# Patient Record
Sex: FEMALE | Race: WHITE | NOT HISPANIC OR LATINO | Employment: FULL TIME | RURAL
[De-identification: names, ages, dates, MRNs, and addresses within clinical notes are randomized per-mention and may not be internally consistent; named-entity substitution may affect disease eponyms.]

---

## 2017-05-08 RX ORDER — LISINOPRIL AND HYDROCHLOROTHIAZIDE 20; 12.5 MG/1; MG/1
TABLET ORAL
Qty: 60 TABLET | Refills: 5 | Status: SHIPPED | OUTPATIENT
Start: 2017-05-08 | End: 2017-07-07 | Stop reason: SDUPTHER

## 2017-07-07 RX ORDER — LISINOPRIL AND HYDROCHLOROTHIAZIDE 20; 12.5 MG/1; MG/1
TABLET ORAL
Qty: 60 TABLET | Refills: 12 | Status: SHIPPED | OUTPATIENT
Start: 2017-07-07 | End: 2018-05-15 | Stop reason: HOSPADM

## 2017-11-14 RX ORDER — LEVOTHYROXINE SODIUM 0.15 MG/1
TABLET ORAL
Qty: 90 TABLET | Refills: 3 | Status: SHIPPED | OUTPATIENT
Start: 2017-11-14 | End: 2017-12-12 | Stop reason: SDUPTHER

## 2017-12-12 RX ORDER — LEVOTHYROXINE SODIUM 0.15 MG/1
TABLET ORAL
Qty: 30 TABLET | Refills: 12 | Status: SHIPPED | OUTPATIENT
Start: 2017-12-12 | End: 2018-01-15 | Stop reason: SDUPTHER

## 2018-01-15 RX ORDER — LEVOTHYROXINE SODIUM 0.15 MG/1
TABLET ORAL
Qty: 30 TABLET | Refills: 12 | Status: SHIPPED | OUTPATIENT
Start: 2018-01-15 | End: 2019-03-14 | Stop reason: DRUGHIGH

## 2018-02-26 ENCOUNTER — OFFICE VISIT (OUTPATIENT)
Dept: OBSTETRICS AND GYNECOLOGY | Facility: CLINIC | Age: 58
End: 2018-02-26

## 2018-02-26 VITALS
SYSTOLIC BLOOD PRESSURE: 122 MMHG | BODY MASS INDEX: 24.64 KG/M2 | WEIGHT: 157 LBS | DIASTOLIC BLOOD PRESSURE: 73 MMHG | HEIGHT: 67 IN

## 2018-02-26 DIAGNOSIS — F33.41 RECURRENT MAJOR DEPRESSIVE DISORDER, IN PARTIAL REMISSION (HCC): Chronic | ICD-10-CM

## 2018-02-26 DIAGNOSIS — F17.200 SMOKER: Chronic | ICD-10-CM

## 2018-02-26 DIAGNOSIS — Z01.419 ENCOUNTER FOR WELL WOMAN EXAM WITH ROUTINE GYNECOLOGICAL EXAM: Primary | ICD-10-CM

## 2018-02-26 DIAGNOSIS — R53.83 LETHARGY: ICD-10-CM

## 2018-02-26 DIAGNOSIS — E03.9 ACQUIRED HYPOTHYROIDISM: Chronic | ICD-10-CM

## 2018-02-26 DIAGNOSIS — Z12.31 ENCOUNTER FOR SCREENING MAMMOGRAM FOR BREAST CANCER: ICD-10-CM

## 2018-02-26 DIAGNOSIS — I10 ESSENTIAL HYPERTENSION: Chronic | ICD-10-CM

## 2018-02-26 PROCEDURE — 99386 PREV VISIT NEW AGE 40-64: CPT | Performed by: OBSTETRICS & GYNECOLOGY

## 2018-02-26 PROCEDURE — 87624 HPV HI-RISK TYP POOLED RSLT: CPT | Performed by: OBSTETRICS & GYNECOLOGY

## 2018-02-26 PROCEDURE — 88142 CYTOPATH C/V THIN LAYER: CPT | Performed by: OBSTETRICS & GYNECOLOGY

## 2018-02-26 RX ORDER — BUPROPION HYDROCHLORIDE 150 MG/1
150 TABLET, EXTENDED RELEASE ORAL EVERY 12 HOURS SCHEDULED
Qty: 60 TABLET | Refills: 12 | Status: ON HOLD | OUTPATIENT
Start: 2018-02-26 | End: 2018-05-13 | Stop reason: ALTCHOICE

## 2018-02-26 RX ORDER — PHENTERMINE HYDROCHLORIDE 37.5 MG/1
37.5 TABLET ORAL
Qty: 60 TABLET | Refills: 3 | Status: ON HOLD | OUTPATIENT
Start: 2018-02-26 | End: 2018-05-13 | Stop reason: ALTCHOICE

## 2018-02-26 RX ORDER — PHENTERMINE HYDROCHLORIDE 30 MG/1
30 CAPSULE ORAL EVERY MORNING
Qty: 60 CAPSULE | Refills: 3 | Status: SHIPPED | OUTPATIENT
Start: 2018-02-26 | End: 2018-05-15 | Stop reason: HOSPADM

## 2018-02-26 NOTE — PROGRESS NOTES
Amee Almonte is a 57 y.o. y/o female     Chief Complaint: Establish care, annual GYN exam and Pap smear, lethargy, depression, and difficulty losing weight.    HPI: 57-year-old  with one prior vaginal delivery and hysterectomy with BSO in .  She is not currently on any hormone replacement therapy, and has not been on hormone replacement therapy since around .  She does not have any hot flashes.    She is here to establish care and for annual GYN exam and Pap smear.  She does have some lethargy and weight gain and wonders if it's related to her hormones.    She has hypertension and hypothyroidism.    Current medications include lisinopril/HCTZ 20/12.5 mg and Synthroid 150 µg.  She is .    She is an  at [employer] in [place].    She smokes a fourth pack cigarettes per day.    She is allergic to iodine which causes swelling and rash.    She has tried Wellbutrin in the past without problems, but she did not feel it gave her the same energy that phentermine did, and she requests to get back on phentermine.  I suggested both Wellbutrin and phentermine.  I have counseled her to quit smoking.    It is been about a year and a half since she's had any lab work done.    Her blood pressures well controlled.  She does not have any chest pain or heart palpitations.  Her bowels are working well.    We discussed checking fasting lab work in starting on Wellbutrin and phentermine, and she is agreeable to this plan.    I reviewed her extensive GYN records.      Review of Systems   Constitutional: Positive for fatigue and unexpected weight change ( Weight gain). Negative for activity change, appetite change, chills, diaphoresis and fever.   Gastrointestinal: Negative for abdominal pain, constipation, diarrhea and nausea.   Genitourinary: Negative for difficulty urinating, dyspareunia, dysuria, pelvic pain, urgency, vaginal bleeding, vaginal discharge and vaginal pain.   Neurological:  Negative for headaches.   Psychiatric/Behavioral: Positive for dysphoric mood. The patient is not nervous/anxious.    All other systems reviewed and are negative.     Breast ROS: negative    The following portions of the patient's history were reviewed and updated as appropriate: allergies, current medications, past family history, past medical history, past social history, past surgical history and problem list.    Allergies   Allergen Reactions   • Iodides Swelling and Rash          Current Outpatient Prescriptions:   •  levothyroxine (SYNTHROID, LEVOTHROID) 150 MCG tablet, TAKE 1 TABLET BY MOUTH EVERY DAY (MUST CALL DOCTOR FOR FURTHER REFILLS), Disp: 30 tablet, Rfl: 12  •  lisinopril-hydrochlorothiazide (PRINZIDE,ZESTORETIC) 20-12.5 MG per tablet, TAKE 2 TABLETS BY MOUTH DAILY, Disp: 60 tablet, Rfl: 12  •  Loratadine (CLARITIN PO), Take 1 tablet by mouth Daily., Disp: , Rfl:   •  buPROPion SR (WELLBUTRIN SR) 150 MG 12 hr tablet, Take 1 tablet by mouth Every 12 (Twelve) Hours., Disp: 60 tablet, Rfl: 12  •  phentermine (ADIPEX-P) 37.5 MG tablet, Take 1 tablet by mouth Daily With Lunch., Disp: 60 tablet, Rfl: 3  •  phentermine 30 MG capsule, Take 1 capsule by mouth Every Morning., Disp: 60 capsule, Rfl: 3     The patient has a family history of   Family History   Problem Relation Age of Onset   • Lung cancer Father    • Skin cancer Mother         Past Medical History:   Diagnosis Date   • Acquired hypothyroidism 2018   • Hypertension    • Recurrent major depressive disorder, in partial remission 2018   • Smoker 2018        OB History      Para Term  AB Living    1 1    1    SAB TAB Ectopic Multiple Live Births                   Social History     Social History   • Marital status:      Spouse name: N/A   • Number of children: N/A   • Years of education: N/A     Occupational History   • Not on file.     Social History Main Topics   • Smoking status: Current Every Day Smoker   •  "Smokeless tobacco: Never Used   • Alcohol use Yes   • Drug use: No   • Sexual activity: Yes     Birth control/ protection: None      Comment: Hysterectomy     Other Topics Concern   • Not on file     Social History Narrative   • No narrative on file        Past Surgical History:   Procedure Laterality Date   • HYSTERECTOMY  2003   • TUBAL ABDOMINAL LIGATION  1999        Patient Active Problem List   Diagnosis   • Hypertension   • Acquired hypothyroidism   • Recurrent major depressive disorder, in partial remission   • Smoker        Documented Vitals    02/26/18 1317   BP: 122/73   Weight: 71.2 kg (157 lb)   Height: 170.2 cm (67\")   PainSc: 0-No pain       Physical Exam   Constitutional: She is oriented to person, place, and time. No distress.   Well-developed well-nourished white female weighing 157 pounds with BMI 24.6.  Blood pressure 122/73.  Pulse 70.   HENT:   Head: Normocephalic and atraumatic.   Eyes: Conjunctivae and EOM are normal. Pupils are equal, round, and reactive to light.   Neck: Normal range of motion. Neck supple. No JVD present. No tracheal deviation present. No thyromegaly present.   Cardiovascular: Normal rate, regular rhythm, normal heart sounds and intact distal pulses.  Exam reveals no gallop and no friction rub.    No murmur heard.  Pulmonary/Chest: Effort normal and breath sounds normal. No stridor. No respiratory distress. She has no wheezes. She has no rales. She exhibits no tenderness. Right breast exhibits no inverted nipple, no mass, no nipple discharge, no skin change and no tenderness. Left breast exhibits no inverted nipple, no mass, no nipple discharge, no skin change and no tenderness. Breasts are symmetrical. There is no breast swelling.   Abdominal: Soft. Bowel sounds are normal. She exhibits no distension and no mass. There is no tenderness. There is no rebound and no guarding. No hernia. Hernia confirmed negative in the right inguinal area and confirmed negative in the left " inguinal area.   Genitourinary: Rectal exam shows no external hemorrhoid, no internal hemorrhoid, no fissure, no mass, no tenderness, anal tone normal and guaiac negative stool. No breast tenderness, discharge or bleeding. No labial fusion. There is no rash, tenderness, lesion or injury on the right labia. There is no rash, tenderness, lesion or injury on the left labia. No vaginal discharge found.   Genitourinary Comments: Pap smear of the vaginal cuff performed.  Cervix surgically absent.  Uterus surgically absent.  Adnexa surgically absent.  No pelvic masses palpated.  Urethral meatus without erythema or prolapse.   Musculoskeletal: Normal range of motion. She exhibits no edema, tenderness or deformity.   Lymphadenopathy:     She has no cervical adenopathy.        Right: No inguinal adenopathy present.        Left: No inguinal adenopathy present.   Neurological: She is alert and oriented to person, place, and time. She has normal reflexes. She displays normal reflexes. No cranial nerve deficit. She exhibits normal muscle tone. Coordination normal.   Skin: Skin is warm and dry. No rash noted. She is not diaphoretic. No erythema. No pallor.   Psychiatric: She has a normal mood and affect. Her behavior is normal. Judgment and thought content normal.   Nursing note and vitals reviewed.       Assessment        Diagnosis Plan   1. Encounter for well woman exam with routine gynecological exam  Liquid-based Pap Smear, Screening   2. Essential hypertension     3. Acquired hypothyroidism     4. Recurrent major depressive disorder, in partial remission     5. Smoker     6. Lethargy     7. Encounter for screening mammogram for breast cancer  Mammo Screening Digital Tomosynthesis Bilateral With CAD         Plan      1. Check fasting lab work.  2. Wellbutrin  mg by mouth twice a day.  3. Phentermine 30 mg capsule each morning.  4. Continue lisinopril and Synthroid.  5. Counseled to quit smoking.  6. Encouraged in diet  and exercise.  7. Handouts on depression, hot flashes, exercise, and vitamin use.   8. Follow-up in 4 weeks.  Follow-up sooner as needed.            This document has been electronically signed by Carlos Max MD on February 26, 2018 2:00 PM

## 2018-03-01 LAB
LAB AP CASE REPORT: NORMAL
LAB AP GYN ADDITIONAL INFORMATION: NORMAL
Lab: NORMAL
PATH INTERP SPEC-IMP: NORMAL
STAT OF ADQ CVX/VAG CYTO-IMP: NORMAL

## 2018-03-05 LAB — HPV I/H RISK 4 DNA CVX QL PROBE+SIG AMP: NEGATIVE

## 2018-04-24 LAB
25(OH)D3 SERPL-MCNC: 50.8 NG/ML (ref 30–100)
ALBUMIN SERPL-MCNC: 4.5 G/DL (ref 3.4–4.8)
ALBUMIN/GLOB SERPL: 1.5 G/DL (ref 1.1–1.8)
ALP SERPL-CCNC: 77 U/L (ref 38–126)
ALT SERPL W P-5'-P-CCNC: 35 U/L (ref 9–52)
ANION GAP SERPL CALCULATED.3IONS-SCNC: 14 MMOL/L (ref 5–15)
ARTICHOKE IGE QN: 159 MG/DL (ref 1–129)
AST SERPL-CCNC: 24 U/L (ref 14–36)
BASOPHILS # BLD AUTO: 0.04 10*3/MM3 (ref 0–0.2)
BASOPHILS NFR BLD AUTO: 0.4 % (ref 0–2)
BILIRUB SERPL-MCNC: 0.4 MG/DL (ref 0.2–1.3)
BUN BLD-MCNC: 12 MG/DL (ref 7–21)
BUN/CREAT SERPL: 14.6 (ref 7–25)
CALCIUM SPEC-SCNC: 10.4 MG/DL (ref 8.4–10.2)
CHLORIDE SERPL-SCNC: 97 MMOL/L (ref 95–110)
CHOLEST SERPL-MCNC: 241 MG/DL (ref 0–199)
CO2 SERPL-SCNC: 26 MMOL/L (ref 22–31)
CREAT BLD-MCNC: 0.82 MG/DL (ref 0.5–1)
DEPRECATED RDW RBC AUTO: 46.3 FL (ref 36.4–46.3)
EOSINOPHIL # BLD AUTO: 0.11 10*3/MM3 (ref 0–0.7)
EOSINOPHIL NFR BLD AUTO: 1 % (ref 0–7)
ERYTHROCYTE [DISTWIDTH] IN BLOOD BY AUTOMATED COUNT: 12.9 % (ref 11.5–14.5)
FOLATE SERPL-MCNC: >20 NG/ML (ref 2.76–21)
GFR SERPL CREATININE-BSD FRML MDRD: 72 ML/MIN/1.73 (ref 51–120)
GLOBULIN UR ELPH-MCNC: 3 GM/DL (ref 2.3–3.5)
GLUCOSE BLD-MCNC: 88 MG/DL (ref 60–100)
HCT VFR BLD AUTO: 45 % (ref 35–45)
HDLC SERPL-MCNC: 57 MG/DL (ref 60–200)
HGB BLD-MCNC: 15.3 G/DL (ref 12–15.5)
IMM GRANULOCYTES # BLD: 0.04 10*3/MM3 (ref 0–0.02)
IMM GRANULOCYTES NFR BLD: 0.4 % (ref 0–0.5)
LDLC/HDLC SERPL: 2.5 {RATIO} (ref 0–3.22)
LYMPHOCYTES # BLD AUTO: 2.89 10*3/MM3 (ref 0.6–4.2)
LYMPHOCYTES NFR BLD AUTO: 27.6 % (ref 10–50)
MCH RBC QN AUTO: 33.2 PG (ref 26.5–34)
MCHC RBC AUTO-ENTMCNC: 34 G/DL (ref 31.4–36)
MCV RBC AUTO: 97.6 FL (ref 80–98)
MONOCYTES # BLD AUTO: 0.74 10*3/MM3 (ref 0–0.9)
MONOCYTES NFR BLD AUTO: 7.1 % (ref 0–12)
NEUTROPHILS # BLD AUTO: 6.66 10*3/MM3 (ref 2–8.6)
NEUTROPHILS NFR BLD AUTO: 63.5 % (ref 37–80)
PLATELET # BLD AUTO: 467 10*3/MM3 (ref 150–450)
PMV BLD AUTO: 10.5 FL (ref 8–12)
POTASSIUM BLD-SCNC: 4.7 MMOL/L (ref 3.5–5.1)
PROT SERPL-MCNC: 7.5 G/DL (ref 6.3–8.6)
RBC # BLD AUTO: 4.61 10*6/MM3 (ref 3.77–5.16)
SODIUM BLD-SCNC: 137 MMOL/L (ref 137–145)
TRIGL SERPL-MCNC: 208 MG/DL (ref 20–199)
VIT B12 BLD-MCNC: 965 PG/ML (ref 239–931)
WBC NRBC COR # BLD: 10.48 10*3/MM3 (ref 3.2–9.8)

## 2018-04-24 PROCEDURE — 82607 VITAMIN B-12: CPT | Performed by: OBSTETRICS & GYNECOLOGY

## 2018-04-24 PROCEDURE — 84479 ASSAY OF THYROID (T3 OR T4): CPT | Performed by: OBSTETRICS & GYNECOLOGY

## 2018-04-24 PROCEDURE — 80061 LIPID PANEL: CPT | Performed by: OBSTETRICS & GYNECOLOGY

## 2018-04-24 PROCEDURE — 84443 ASSAY THYROID STIM HORMONE: CPT | Performed by: OBSTETRICS & GYNECOLOGY

## 2018-04-24 PROCEDURE — 82306 VITAMIN D 25 HYDROXY: CPT | Performed by: OBSTETRICS & GYNECOLOGY

## 2018-04-24 PROCEDURE — 82746 ASSAY OF FOLIC ACID SERUM: CPT | Performed by: OBSTETRICS & GYNECOLOGY

## 2018-04-24 PROCEDURE — 80053 COMPREHEN METABOLIC PANEL: CPT | Performed by: OBSTETRICS & GYNECOLOGY

## 2018-04-24 PROCEDURE — 36415 COLL VENOUS BLD VENIPUNCTURE: CPT | Performed by: OBSTETRICS & GYNECOLOGY

## 2018-04-24 PROCEDURE — 85025 COMPLETE CBC W/AUTO DIFF WBC: CPT | Performed by: OBSTETRICS & GYNECOLOGY

## 2018-04-24 PROCEDURE — 84436 ASSAY OF TOTAL THYROXINE: CPT | Performed by: OBSTETRICS & GYNECOLOGY

## 2018-04-25 LAB
FT4I SERPL CALC-MCNC: 3.2 (ref 1.2–4.9)
T3RU NFR SERPL: 33 % (ref 24–39)
T4 SERPL-MCNC: 9.8 UG/DL (ref 4.5–12)
TSH SERPL-ACNC: 0.17 UIU/ML (ref 0.45–4.5)

## 2018-05-02 ENCOUNTER — OFFICE VISIT (OUTPATIENT)
Dept: OBSTETRICS AND GYNECOLOGY | Facility: CLINIC | Age: 58
End: 2018-05-02

## 2018-05-02 VITALS
HEIGHT: 67 IN | SYSTOLIC BLOOD PRESSURE: 119 MMHG | WEIGHT: 157 LBS | BODY MASS INDEX: 24.64 KG/M2 | DIASTOLIC BLOOD PRESSURE: 76 MMHG

## 2018-05-02 DIAGNOSIS — F17.200 SMOKER: ICD-10-CM

## 2018-05-02 DIAGNOSIS — I10 ESSENTIAL HYPERTENSION: Primary | ICD-10-CM

## 2018-05-02 DIAGNOSIS — E03.9 ACQUIRED HYPOTHYROIDISM: ICD-10-CM

## 2018-05-02 DIAGNOSIS — F33.41 RECURRENT MAJOR DEPRESSIVE DISORDER, IN PARTIAL REMISSION (HCC): ICD-10-CM

## 2018-05-02 PROCEDURE — 99213 OFFICE O/P EST LOW 20 MIN: CPT | Performed by: OBSTETRICS & GYNECOLOGY

## 2018-05-05 NOTE — PROGRESS NOTES
Amee Almonte is a 57 y.o. y/o female     Chief Complaint: Lethargy, depression, and difficulty losing weight.    HPI: 57-year-old  with one prior vaginal delivery and hysterectomy with BSO in .  She is not currently on any hormone replacement therapy, and has not been on hormone replacement therapy since around .  She does not have any hot flashes.     She is here to establish care and for annual GYN exam and Pap smear.  She does have some lethargy and weight gain and wonders if it's related to her hormones.     She has hypertension and hypothyroidism.     Current medications include lisinopril/HCTZ 20/12.5 mg and Synthroid 150 µg.  She is .     She is an  at [employer] in [place].     She smokes a fourth pack cigarettes per day.     She is allergic to iodine which causes swelling and rash.     She has tried Wellbutrin in the past without problems, but she did not feel it gave her the same energy that phentermine did, and she requests to get back on phentermine.  I suggested both Wellbutrin and phentermine.  I have counseled her to quit smoking.     It is been about a year and a half since she's had any lab work done.     Her blood pressures well controlled.  She does not have any chest pain or heart palpitations.  Her bowels are working well.     We discussed checking fasting lab work in starting on Wellbutrin and phentermine x 2, and she is agreeable to this plan.    2018 Pap smear negative for intraepithelial lesion or malignancy and negative for high-risk HPV.    Fasting lab work obtained 2018 showed vitamin D 50.8.  B12 965.  TSH 0.169, T4 9.8, T3 uptake 33, free thyroxine index 3.2.  Total cholesterol 241, triglycerides 208, HDL 57, , LDL/HDL ratio normal at 2.50.  Hemoglobin 15.3 and hematocrit 45.0 and platelets 467,000.  Fasting glucose 88.  Normal CMP.  I reviewed all of these results with her in detail.    Review of Systems    Constitutional: Positive for fatigue. Negative for activity change, appetite change, chills, diaphoresis, fever and unexpected weight change.   Gastrointestinal: Negative for abdominal pain, constipation, diarrhea and nausea.   Genitourinary: Negative for difficulty urinating, dyspareunia, dysuria, pelvic pain, urgency, vaginal bleeding, vaginal discharge and vaginal pain.   Neurological: Negative for headaches.   Psychiatric/Behavioral: Positive for dysphoric mood. The patient is not nervous/anxious.    All other systems reviewed and are negative.     Breast ROS: negative    The following portions of the patient's history were reviewed and updated as appropriate: allergies, current medications, past family history, past medical history, past social history, past surgical history and problem list.    Allergies   Allergen Reactions   • Iodides Swelling and Rash          Current Outpatient Prescriptions:   •  buPROPion SR (WELLBUTRIN SR) 150 MG 12 hr tablet, Take 1 tablet by mouth Every 12 (Twelve) Hours., Disp: 60 tablet, Rfl: 12  •  levothyroxine (SYNTHROID, LEVOTHROID) 150 MCG tablet, TAKE 1 TABLET BY MOUTH EVERY DAY (MUST CALL DOCTOR FOR FURTHER REFILLS), Disp: 30 tablet, Rfl: 12  •  lisinopril-hydrochlorothiazide (PRINZIDE,ZESTORETIC) 20-12.5 MG per tablet, TAKE 2 TABLETS BY MOUTH DAILY, Disp: 60 tablet, Rfl: 12  •  Loratadine (CLARITIN PO), Take 1 tablet by mouth Daily., Disp: , Rfl:   •  phentermine (ADIPEX-P) 37.5 MG tablet, Take 1 tablet by mouth Daily With Lunch., Disp: 60 tablet, Rfl: 3  •  phentermine 30 MG capsule, Take 1 capsule by mouth Every Morning., Disp: 60 capsule, Rfl: 3     The patient has a family history of   Family History   Problem Relation Age of Onset   • Lung cancer Father    • Skin cancer Mother         Past Medical History:   Diagnosis Date   • Acquired hypothyroidism 2/26/2018   • Hypertension    • Recurrent major depressive disorder, in partial remission 2/26/2018   • Smoker  "2018        OB History      Para Term  AB Living    1 1    1    SAB TAB Ectopic Molar Multiple Live Births                    Social History     Social History   • Marital status:      Spouse name: N/A   • Number of children: N/A   • Years of education: N/A     Occupational History   • Not on file.     Social History Main Topics   • Smoking status: Current Every Day Smoker   • Smokeless tobacco: Never Used   • Alcohol use Yes   • Drug use: No   • Sexual activity: Yes     Birth control/ protection: None      Comment: Hysterectomy     Other Topics Concern   • Not on file     Social History Narrative   • No narrative on file        Past Surgical History:   Procedure Laterality Date   • HYSTERECTOMY     • TUBAL ABDOMINAL LIGATION          Patient Active Problem List   Diagnosis   • Hypertension   • Acquired hypothyroidism   • Recurrent major depressive disorder, in partial remission   • Smoker        Documented Vitals    18 1049   BP: 119/76   Weight: 71.2 kg (157 lb)   Height: 170.2 cm (67\")       Physical Exam   Constitutional: She is oriented to person, place, and time. She appears well-developed and well-nourished. No distress.   157 pounds with BMI 24.6   HENT:   Head: Normocephalic and atraumatic.   Eyes: Conjunctivae and EOM are normal. Pupils are equal, round, and reactive to light.   Neck: Normal range of motion. Neck supple. No JVD present. No tracheal deviation present. No thyromegaly present.   Cardiovascular: Normal rate, regular rhythm, normal heart sounds and intact distal pulses.  Exam reveals no gallop and no friction rub.    No murmur heard.  Pulmonary/Chest: Effort normal and breath sounds normal. No stridor. No respiratory distress. She has no wheezes. She has no rales. She exhibits no tenderness.   Abdominal: Soft. Bowel sounds are normal. She exhibits no distension and no mass. There is no tenderness. There is no rebound and no guarding. No hernia. "   Musculoskeletal: Normal range of motion. She exhibits no edema, tenderness or deformity.   Lymphadenopathy:     She has no cervical adenopathy.   Neurological: She is alert and oriented to person, place, and time. She has normal reflexes. She displays normal reflexes. No cranial nerve deficit. She exhibits normal muscle tone. Coordination normal.   Skin: Skin is warm and dry. No rash noted. She is not diaphoretic. No erythema. No pallor.   Psychiatric: She has a normal mood and affect. Her behavior is normal. Judgment and thought content normal.   Nursing note and vitals reviewed.       Assessment        Diagnosis Plan   1. Essential hypertension     2. Acquired hypothyroidism     3. Recurrent major depressive disorder, in partial remission     4. Smoker           Plan      1. Continue Wellbutrin  mg twice a day.  2. Continue phentermine 30 mg capsule each morning and 37.5 mg tablet at noon.  3. Continue lisinopril and Synthroid.  4. Counseled to quit smoking.  5. Encouraged in diet and exercise.  6. Follow-up in 6 weeks.  Follow-up sooner as needed.            This document has been electronically signed by Carlos Max MD on May 5, 2018 2:17 PM

## 2018-05-13 ENCOUNTER — HOSPITAL ENCOUNTER (INPATIENT)
Facility: HOSPITAL | Age: 58
LOS: 2 days | Discharge: HOME | End: 2018-05-15
Attending: INTERNAL MEDICINE | Admitting: INTERNAL MEDICINE

## 2018-05-13 ENCOUNTER — HOSPITAL ENCOUNTER (INPATIENT)
Facility: HOSPITAL | Age: 58
End: 2018-05-13
Attending: INTERNAL MEDICINE | Admitting: INTERNAL MEDICINE

## 2018-05-13 PROBLEM — I21.19 INFERIOR MI (HCC): Status: ACTIVE | Noted: 2018-05-13

## 2018-05-13 LAB
ALBUMIN SERPL-MCNC: 4 G/DL (ref 3.4–4.8)
ALBUMIN/GLOB SERPL: 1.4 G/DL (ref 1.1–1.8)
ALP SERPL-CCNC: 79 U/L (ref 38–126)
ALT SERPL W P-5'-P-CCNC: 35 U/L (ref 9–52)
ANION GAP SERPL CALCULATED.3IONS-SCNC: 6 MMOL/L (ref 5–15)
ARTICHOKE IGE QN: 143 MG/DL (ref 1–129)
AST SERPL-CCNC: 48 U/L (ref 14–36)
BILIRUB SERPL-MCNC: 0.3 MG/DL (ref 0.2–1.3)
BUN BLD-MCNC: 14 MG/DL (ref 7–21)
BUN/CREAT SERPL: 17.3 (ref 7–25)
CALCIUM SPEC-SCNC: 9.4 MG/DL (ref 8.4–10.2)
CHLORIDE SERPL-SCNC: 101 MMOL/L (ref 95–110)
CHOLEST SERPL-MCNC: 223 MG/DL (ref 0–199)
CO2 SERPL-SCNC: 28 MMOL/L (ref 22–31)
CREAT BLD-MCNC: 0.81 MG/DL (ref 0.5–1)
DEPRECATED RDW RBC AUTO: 44.6 FL (ref 36.4–46.3)
ERYTHROCYTE [DISTWIDTH] IN BLOOD BY AUTOMATED COUNT: 12.8 % (ref 11.5–14.5)
GFR SERPL CREATININE-BSD FRML MDRD: 73 ML/MIN/1.73 (ref 60–120)
GLOBULIN UR ELPH-MCNC: 2.8 GM/DL (ref 2.3–3.5)
GLUCOSE BLD-MCNC: 102 MG/DL (ref 60–100)
HCT VFR BLD AUTO: 40 % (ref 35–45)
HDLC SERPL-MCNC: 58 MG/DL (ref 60–200)
HGB BLD-MCNC: 13.9 G/DL (ref 12–15.5)
LDLC/HDLC SERPL: 2.62 {RATIO} (ref 0–3.22)
MCH RBC QN AUTO: 33.1 PG (ref 26.5–34)
MCHC RBC AUTO-ENTMCNC: 34.8 G/DL (ref 31.4–36)
MCV RBC AUTO: 95.2 FL (ref 80–98)
PLATELET # BLD AUTO: 390 10*3/MM3 (ref 150–450)
PMV BLD AUTO: 10 FL (ref 8–12)
POTASSIUM BLD-SCNC: 5.2 MMOL/L (ref 3.5–5.1)
PROT SERPL-MCNC: 6.8 G/DL (ref 6.3–8.6)
RBC # BLD AUTO: 4.2 10*6/MM3 (ref 3.77–5.16)
SODIUM BLD-SCNC: 135 MMOL/L (ref 137–145)
TRIGL SERPL-MCNC: 66 MG/DL (ref 20–199)
TROPONIN I SERPL-MCNC: 1.52 NG/ML
WBC NRBC COR # BLD: 13.43 10*3/MM3 (ref 3.2–9.8)

## 2018-05-13 PROCEDURE — B2151ZZ FLUOROSCOPY OF LEFT HEART USING LOW OSMOLAR CONTRAST: ICD-10-PCS | Performed by: INTERNAL MEDICINE

## 2018-05-13 PROCEDURE — B2111ZZ FLUOROSCOPY OF MULTIPLE CORONARY ARTERIES USING LOW OSMOLAR CONTRAST: ICD-10-PCS | Performed by: INTERNAL MEDICINE

## 2018-05-13 PROCEDURE — C1894 INTRO/SHEATH, NON-LASER: HCPCS | Performed by: INTERNAL MEDICINE

## 2018-05-13 PROCEDURE — 25010000002 HEPARIN (PORCINE) PER 1000 UNITS: Performed by: INTERNAL MEDICINE

## 2018-05-13 PROCEDURE — 84484 ASSAY OF TROPONIN QUANT: CPT | Performed by: INTERNAL MEDICINE

## 2018-05-13 PROCEDURE — 93010 ELECTROCARDIOGRAM REPORT: CPT | Performed by: INTERNAL MEDICINE

## 2018-05-13 PROCEDURE — 0 IOPAMIDOL PER 1 ML: Performed by: INTERNAL MEDICINE

## 2018-05-13 PROCEDURE — C1769 GUIDE WIRE: HCPCS | Performed by: INTERNAL MEDICINE

## 2018-05-13 PROCEDURE — 85027 COMPLETE CBC AUTOMATED: CPT | Performed by: INTERNAL MEDICINE

## 2018-05-13 PROCEDURE — 93458 L HRT ARTERY/VENTRICLE ANGIO: CPT | Performed by: INTERNAL MEDICINE

## 2018-05-13 PROCEDURE — 25010000002 DIPHENHYDRAMINE PER 50 MG: Performed by: INTERNAL MEDICINE

## 2018-05-13 PROCEDURE — C1725 CATH, TRANSLUMIN NON-LASER: HCPCS | Performed by: INTERNAL MEDICINE

## 2018-05-13 PROCEDURE — 94799 UNLISTED PULMONARY SVC/PX: CPT

## 2018-05-13 PROCEDURE — 25010000002 METHYLPREDNISOLONE PER 125 MG: Performed by: INTERNAL MEDICINE

## 2018-05-13 PROCEDURE — 94760 N-INVAS EAR/PLS OXIMETRY 1: CPT

## 2018-05-13 PROCEDURE — C9606 PERC D-E COR REVASC W AMI S: HCPCS | Performed by: INTERNAL MEDICINE

## 2018-05-13 PROCEDURE — 4A023N7 MEASUREMENT OF CARDIAC SAMPLING AND PRESSURE, LEFT HEART, PERCUTANEOUS APPROACH: ICD-10-PCS | Performed by: INTERNAL MEDICINE

## 2018-05-13 PROCEDURE — C1887 CATHETER, GUIDING: HCPCS | Performed by: INTERNAL MEDICINE

## 2018-05-13 PROCEDURE — 93005 ELECTROCARDIOGRAM TRACING: CPT | Performed by: INTERNAL MEDICINE

## 2018-05-13 PROCEDURE — 80061 LIPID PANEL: CPT | Performed by: INTERNAL MEDICINE

## 2018-05-13 PROCEDURE — 99223 1ST HOSP IP/OBS HIGH 75: CPT | Performed by: INTERNAL MEDICINE

## 2018-05-13 PROCEDURE — 25010000002 ONDANSETRON PER 1 MG: Performed by: INTERNAL MEDICINE

## 2018-05-13 PROCEDURE — 92928 PRQ TCAT PLMT NTRAC ST 1 LES: CPT | Performed by: INTERNAL MEDICINE

## 2018-05-13 PROCEDURE — 80053 COMPREHEN METABOLIC PANEL: CPT | Performed by: INTERNAL MEDICINE

## 2018-05-13 PROCEDURE — C1874 STENT, COATED/COV W/DEL SYS: HCPCS | Performed by: INTERNAL MEDICINE

## 2018-05-13 PROCEDURE — 25010000002 HYDROMORPHONE PER 4 MG: Performed by: INTERNAL MEDICINE

## 2018-05-13 PROCEDURE — 027034Z DILATION OF CORONARY ARTERY, ONE ARTERY WITH DRUG-ELUTING INTRALUMINAL DEVICE, PERCUTANEOUS APPROACH: ICD-10-PCS | Performed by: INTERNAL MEDICINE

## 2018-05-13 PROCEDURE — C1760 CLOSURE DEV, VASC: HCPCS | Performed by: INTERNAL MEDICINE

## 2018-05-13 DEVICE — XIENCE ALPINE EVEROLIMUS ELUTING CORONARY STENT SYSTEM 3.00 MM X 23 MM / RAPID-EXCHANGE
Type: IMPLANTABLE DEVICE | Status: FUNCTIONAL
Brand: XIENCE ALPINE

## 2018-05-13 RX ORDER — METOPROLOL SUCCINATE 25 MG/1
25 TABLET, EXTENDED RELEASE ORAL
Status: DISCONTINUED | OUTPATIENT
Start: 2018-05-13 | End: 2018-05-15 | Stop reason: HOSPADM

## 2018-05-13 RX ORDER — LIDOCAINE HYDROCHLORIDE 20 MG/ML
INJECTION, SOLUTION INFILTRATION; PERINEURAL AS NEEDED
Status: DISCONTINUED | OUTPATIENT
Start: 2018-05-13 | End: 2018-05-13 | Stop reason: HOSPADM

## 2018-05-13 RX ORDER — LEVOTHYROXINE SODIUM 0.15 MG/1
150 TABLET ORAL
Status: DISCONTINUED | OUTPATIENT
Start: 2018-05-14 | End: 2018-05-15 | Stop reason: HOSPADM

## 2018-05-13 RX ORDER — DIPHENHYDRAMINE HYDROCHLORIDE 50 MG/ML
INJECTION INTRAMUSCULAR; INTRAVENOUS AS NEEDED
Status: DISCONTINUED | OUTPATIENT
Start: 2018-05-13 | End: 2018-05-13 | Stop reason: HOSPADM

## 2018-05-13 RX ORDER — ONDANSETRON 2 MG/ML
INJECTION INTRAMUSCULAR; INTRAVENOUS AS NEEDED
Status: DISCONTINUED | OUTPATIENT
Start: 2018-05-13 | End: 2018-05-13 | Stop reason: HOSPADM

## 2018-05-13 RX ORDER — METHYLPREDNISOLONE SODIUM SUCCINATE 125 MG/2ML
INJECTION, POWDER, LYOPHILIZED, FOR SOLUTION INTRAMUSCULAR; INTRAVENOUS AS NEEDED
Status: DISCONTINUED | OUTPATIENT
Start: 2018-05-13 | End: 2018-05-13 | Stop reason: HOSPADM

## 2018-05-13 RX ORDER — ROSUVASTATIN CALCIUM 10 MG/1
10 TABLET, COATED ORAL NIGHTLY
Status: DISCONTINUED | OUTPATIENT
Start: 2018-05-13 | End: 2018-05-15 | Stop reason: HOSPADM

## 2018-05-13 RX ORDER — ASPIRIN 81 MG/1
81 TABLET, CHEWABLE ORAL DAILY
Status: DISCONTINUED | OUTPATIENT
Start: 2018-05-13 | End: 2018-05-15 | Stop reason: HOSPADM

## 2018-05-13 RX ORDER — HEPARIN SODIUM 1000 [USP'U]/ML
INJECTION, SOLUTION INTRAVENOUS; SUBCUTANEOUS AS NEEDED
Status: DISCONTINUED | OUTPATIENT
Start: 2018-05-13 | End: 2018-05-13 | Stop reason: HOSPADM

## 2018-05-13 RX ORDER — FAMOTIDINE 20 MG/50ML
20 INJECTION, SOLUTION INTRAVENOUS ONCE
Status: COMPLETED | OUTPATIENT
Start: 2018-05-13 | End: 2018-05-13

## 2018-05-13 RX ORDER — PANTOPRAZOLE SODIUM 40 MG/1
40 TABLET, DELAYED RELEASE ORAL
Status: DISCONTINUED | OUTPATIENT
Start: 2018-05-14 | End: 2018-05-15 | Stop reason: HOSPADM

## 2018-05-13 RX ORDER — SODIUM CHLORIDE 9 MG/ML
125 INJECTION, SOLUTION INTRAVENOUS CONTINUOUS
Status: DISCONTINUED | OUTPATIENT
Start: 2018-05-13 | End: 2018-05-14

## 2018-05-13 RX ORDER — BUPROPION HYDROCHLORIDE 150 MG/1
150 TABLET, EXTENDED RELEASE ORAL EVERY 12 HOURS SCHEDULED
Status: DISCONTINUED | OUTPATIENT
Start: 2018-05-13 | End: 2018-05-14

## 2018-05-13 RX ADMIN — TICAGRELOR 90 MG: 90 TABLET ORAL at 20:02

## 2018-05-13 RX ADMIN — METOPROLOL SUCCINATE 25 MG: 25 TABLET, EXTENDED RELEASE ORAL at 17:12

## 2018-05-13 RX ADMIN — ROSUVASTATIN CALCIUM 10 MG: 10 TABLET, FILM COATED ORAL at 20:02

## 2018-05-13 RX ADMIN — SODIUM CHLORIDE 125 ML/HR: 900 INJECTION, SOLUTION INTRAVENOUS at 18:08

## 2018-05-13 RX ADMIN — FAMOTIDINE 20 MG: 20 INJECTION, SOLUTION INTRAVENOUS at 10:40

## 2018-05-13 RX ADMIN — HYDROMORPHONE HYDROCHLORIDE 0.2 MG: 1 INJECTION, SOLUTION INTRAMUSCULAR; INTRAVENOUS; SUBCUTANEOUS at 21:36

## 2018-05-13 NOTE — H&P
[facility] Cardiology  HISTORY AND PHYSICAL  Amee Almonte  57 y.o. female    Chief complaint -severe chest pain      History of present Illness- 57-year-old lady transferred from [facility] emergency room by Dr. Don after she presented there with severe substernal chest pain associated with diaphoresis and shortness of breath with nausea at about 8:00 and was found to have ST elevation in inferolateral leads and was transferred here for possible PCI.  Patient has been having chest pain off and on.  She works at a bank as a  and she is a smoker smokes about half pack a day.  She has history of hypertension and has history of CAD in her family.        Allergies   Allergen Reactions   • Iodides Swelling and Rash         Past Medical History:   Diagnosis Date   • Acquired hypothyroidism 2/26/2018   • Hypertension    • Recurrent major depressive disorder, in partial remission 2/26/2018   • Smoker 2/26/2018         Past Surgical History:   Procedure Laterality Date   • HYSTERECTOMY  2003   • TUBAL ABDOMINAL LIGATION  1999         Family History   Problem Relation Age of Onset   • Lung cancer Father    • Skin cancer Mother          Social History     Social History   • Marital status:      Spouse name: N/A   • Number of children: N/A   • Years of education: N/A     Occupational History   • Not on file.     Social History Main Topics   • Smoking status: Current Every Day Smoker   • Smokeless tobacco: Never Used   • Alcohol use Yes   • Drug use: No   • Sexual activity: Yes     Birth control/ protection: None      Comment: Hysterectomy     Other Topics Concern   • Not on file     Social History Narrative   • No narrative on file     Family history-premature CAD in her family    Personal history-smokes half a day    Home medications-Synthroid 150 µg daily, Zestoretic 20/12.5 mg daily, Wellbutrin 150 mg twice a day      Review of Systems     Constitution: Denies any  fatigue, fever or chills    HENT: Denies any headache, hearing impairment    Eyes: Denies any blurring of vision, or photophobia     Cardivascular - As per history of present illness     Respiratory system-denies any COPD, shortness of breath     Endocrine:  No history of hyperlipidemia, diabetes       Musculoskeletal:  No history of arthritis with musculoskeletal problems    Gastrointestinal: No nausea, vomiting, or melena    Genitourinary: No dysuria or hematuria    Neurological:   No history of seizure disorder, stroke, memory problems    Psychiatric/Behavioral:        No history of depression, bipolar disorder or schizophrenia     Hematological- no history of easy bruising            OBJECTIVE    There were no vitals taken for this visit.      Physical Exam     Constitutional: is oriented to person, place, and time.     Skin-warm and dry    Well developed and nourished in no acute distress      Head: Normocephalic and atraumatic.     Eyes: Pupils are equal, round, and reactive to light.     Neck: Neck supple. No bruit in the carotids, no elevation of JVD    Cardiovascular: Lemmon in the fifth intercostal space, regular rate, and  Rhythm,  S1 greater than S2, no S3 or S4, no gallop       Pulmonary/Chest:   Air  Entry is equal on both sides  No wheezing or crackles,      Abdominal: Soft.  No hepatosplenomegaly, bowel sounds are present    Musculoskeletal: No kyphoscoliosis    Neurological: is alert and oriented to person, place, and time.    cranial nerve are intact .   No motor or sensory deficit    Extremities-no edema, no radial femoral delay      Psychiatric: He has a normal mood and affect.                  His behavior is normal.        Lab Results (last 24 hours)     ** No results found for the last 24 hours. **          EKG showed ST elevation 2,3 and aVF with reciprocal changes in inferolateral leads       A/P    Acute inferior wall MI-explain the risk and benefit and patient was taken to the catheter lab  emergently for possible PCI as she was having active chest pain with EKG changes.    Hypertension-will hold the blood pressure medicines depending on her blood pressure after the PCI.    Tobacco abuse needs his factor modification.    We will start her on statins.        Bro Sotelo MD  5/13/2018  11:17 AM    EMR Dragon/Transcription disclaimer:   Some of this note may be an electronic transcription/translation of spoken language to printed text. The electronic translation of spoken language may permit erroneous, or at times, nonsensical words or phrases to be inadvertently transcribed; Although I have reviewed the note for such errors, some may still exist.

## 2018-05-13 NOTE — PLAN OF CARE
Problem: Patient Care Overview  Goal: Plan of Care Review  Outcome: Ongoing (interventions implemented as appropriate)   05/13/18 6398   Coping/Psychosocial   Plan of Care Reviewed With patient;spouse   Plan of Care Review   Progress improving   OTHER   Outcome Summary R groin remains angiosealed without evidence of hematoma, soft, non tender, pulses WNL, no changes to color. Denies tingling numbness. Up out of bed without difficulty. UOP adequate. VSS this shift.        Problem: Cardiac Catheterization (Diagnostic/Interventional) (Adult)  Goal: Signs and Symptoms of Listed Potential Problems Will be Absent, Minimized or Managed (Cardiac Catheterization)  Outcome: Ongoing (interventions implemented as appropriate)    Goal: Anesthesia/Sedation Recovery  Outcome: Outcome(s) achieved Date Met: 05/13/18

## 2018-05-14 ENCOUNTER — APPOINTMENT (OUTPATIENT)
Dept: CARDIOLOGY | Facility: HOSPITAL | Age: 58
End: 2018-05-14
Attending: INTERNAL MEDICINE

## 2018-05-14 LAB
ANION GAP SERPL CALCULATED.3IONS-SCNC: 5 MMOL/L (ref 5–15)
BH CV ECHO MEAS - ACS: 2 CM
BH CV ECHO MEAS - AO MAX PG (FULL): 2.6 MMHG
BH CV ECHO MEAS - AO MAX PG: 8 MMHG
BH CV ECHO MEAS - AO MEAN PG (FULL): 0.94 MMHG
BH CV ECHO MEAS - AO MEAN PG: 3.6 MMHG
BH CV ECHO MEAS - AO ROOT AREA (BSA CORRECTED): 1.8
BH CV ECHO MEAS - AO ROOT AREA: 8.9 CM^2
BH CV ECHO MEAS - AO ROOT DIAM: 3.4 CM
BH CV ECHO MEAS - AO V2 MAX: 141 CM/SEC
BH CV ECHO MEAS - AO V2 MEAN: 89.2 CM/SEC
BH CV ECHO MEAS - AO V2 VTI: 24.9 CM
BH CV ECHO MEAS - AVA(I,A): 2 CM^2
BH CV ECHO MEAS - AVA(I,D): 2 CM^2
BH CV ECHO MEAS - AVA(V,A): 2 CM^2
BH CV ECHO MEAS - AVA(V,D): 2 CM^2
BH CV ECHO MEAS - BSA(HAYCOCK): 1.9 M^2
BH CV ECHO MEAS - BSA: 1.8 M^2
BH CV ECHO MEAS - BZI_BMI: 27.9 KILOGRAMS/M^2
BH CV ECHO MEAS - BZI_METRIC_HEIGHT: 165 CM
BH CV ECHO MEAS - BZI_METRIC_WEIGHT: 76 KG
BH CV ECHO MEAS - EDV(CUBED): 73.5 ML
BH CV ECHO MEAS - EDV(TEICH): 78.1 ML
BH CV ECHO MEAS - EF(CUBED): 66.9 %
BH CV ECHO MEAS - EF(TEICH): 58.8 %
BH CV ECHO MEAS - ESV(CUBED): 24.3 ML
BH CV ECHO MEAS - ESV(TEICH): 32.1 ML
BH CV ECHO MEAS - FS: 30.8 %
BH CV ECHO MEAS - IVS/LVPW: 0.98
BH CV ECHO MEAS - IVSD: 0.99 CM
BH CV ECHO MEAS - LA DIMENSION: 4 CM
BH CV ECHO MEAS - LA/AO: 1.2
BH CV ECHO MEAS - LV MASS(C)D: 136.5 GRAMS
BH CV ECHO MEAS - LV MASS(C)DI: 74.4 GRAMS/M^2
BH CV ECHO MEAS - LV MAX PG: 5.4 MMHG
BH CV ECHO MEAS - LV MEAN PG: 2.7 MMHG
BH CV ECHO MEAS - LV V1 MAX: 116 CM/SEC
BH CV ECHO MEAS - LV V1 MEAN: 76 CM/SEC
BH CV ECHO MEAS - LV V1 VTI: 20.2 CM
BH CV ECHO MEAS - LVIDD: 4.2 CM
BH CV ECHO MEAS - LVIDS: 2.9 CM
BH CV ECHO MEAS - LVOT AREA: 2.4 CM^2
BH CV ECHO MEAS - LVOT DIAM: 1.8 CM
BH CV ECHO MEAS - LVPWD: 1 CM
BH CV ECHO MEAS - MR MAX PG: 77.3 MMHG
BH CV ECHO MEAS - MR MAX VEL: 439.7 CM/SEC
BH CV ECHO MEAS - MV A MAX VEL: 79.2 CM/SEC
BH CV ECHO MEAS - MV E MAX VEL: 94.8 CM/SEC
BH CV ECHO MEAS - MV E/A: 1.2
BH CV ECHO MEAS - MV MAX PG: 5.2 MMHG
BH CV ECHO MEAS - MV MEAN PG: 2.3 MMHG
BH CV ECHO MEAS - MV P1/2T MAX VEL: 110 CM/SEC
BH CV ECHO MEAS - MV V2 MAX: 114 CM/SEC
BH CV ECHO MEAS - MV V2 MEAN: 72.7 CM/SEC
BH CV ECHO MEAS - MV V2 VTI: 25.2 CM
BH CV ECHO MEAS - MVA P1/2T LCG: 2 CM^2
BH CV ECHO MEAS - MVA(VTI): 2 CM^2
BH CV ECHO MEAS - PA MAX PG: 2.2 MMHG
BH CV ECHO MEAS - PA V2 MAX: 74.6 CM/SEC
BH CV ECHO MEAS - RAP SYSTOLE: 10 MMHG
BH CV ECHO MEAS - RVDD: 2.7 CM
BH CV ECHO MEAS - SI(AO): 120.1 ML/M^2
BH CV ECHO MEAS - SI(CUBED): 26.8 ML/M^2
BH CV ECHO MEAS - SI(LVOT): 26.9 ML/M^2
BH CV ECHO MEAS - SI(TEICH): 25 ML/M^2
BH CV ECHO MEAS - SV(AO): 220.3 ML
BH CV ECHO MEAS - SV(CUBED): 49.2 ML
BH CV ECHO MEAS - SV(LVOT): 49.3 ML
BH CV ECHO MEAS - SV(TEICH): 45.9 ML
BUN BLD-MCNC: 10 MG/DL (ref 7–21)
BUN/CREAT SERPL: 13.9 (ref 7–25)
CALCIUM SPEC-SCNC: 8.6 MG/DL (ref 8.4–10.2)
CHLORIDE SERPL-SCNC: 106 MMOL/L (ref 95–110)
CO2 SERPL-SCNC: 25 MMOL/L (ref 22–31)
CREAT BLD-MCNC: 0.72 MG/DL (ref 0.5–1)
DEPRECATED RDW RBC AUTO: 46.2 FL (ref 36.4–46.3)
ERYTHROCYTE [DISTWIDTH] IN BLOOD BY AUTOMATED COUNT: 13.2 % (ref 11.5–14.5)
GFR SERPL CREATININE-BSD FRML MDRD: 84 ML/MIN/1.73 (ref 60–120)
GLUCOSE BLD-MCNC: 92 MG/DL (ref 60–100)
HCT VFR BLD AUTO: 36.4 % (ref 35–45)
HGB BLD-MCNC: 12.6 G/DL (ref 12–15.5)
LV EF 2D ECHO EST: 60 %
MAXIMAL PREDICTED HEART RATE: 165 BPM
MCH RBC QN AUTO: 33.6 PG (ref 26.5–34)
MCHC RBC AUTO-ENTMCNC: 34.6 G/DL (ref 31.4–36)
MCV RBC AUTO: 97.1 FL (ref 80–98)
PLATELET # BLD AUTO: 384 10*3/MM3 (ref 150–450)
PMV BLD AUTO: 9.6 FL (ref 8–12)
POTASSIUM BLD-SCNC: 3.8 MMOL/L (ref 3.5–5.1)
RBC # BLD AUTO: 3.75 10*6/MM3 (ref 3.77–5.16)
SODIUM BLD-SCNC: 136 MMOL/L (ref 137–145)
STRESS TARGET HR: 140 BPM
TROPONIN I SERPL-MCNC: 9.25 NG/ML
WBC NRBC COR # BLD: 14.22 10*3/MM3 (ref 3.2–9.8)

## 2018-05-14 PROCEDURE — 99232 SBSQ HOSP IP/OBS MODERATE 35: CPT | Performed by: INTERNAL MEDICINE

## 2018-05-14 PROCEDURE — 93306 TTE W/DOPPLER COMPLETE: CPT | Performed by: INTERNAL MEDICINE

## 2018-05-14 PROCEDURE — 80048 BASIC METABOLIC PNL TOTAL CA: CPT | Performed by: INTERNAL MEDICINE

## 2018-05-14 PROCEDURE — 93306 TTE W/DOPPLER COMPLETE: CPT

## 2018-05-14 PROCEDURE — 93010 ELECTROCARDIOGRAM REPORT: CPT | Performed by: INTERNAL MEDICINE

## 2018-05-14 PROCEDURE — 84484 ASSAY OF TROPONIN QUANT: CPT | Performed by: INTERNAL MEDICINE

## 2018-05-14 PROCEDURE — 85027 COMPLETE CBC AUTOMATED: CPT | Performed by: INTERNAL MEDICINE

## 2018-05-14 PROCEDURE — 93005 ELECTROCARDIOGRAM TRACING: CPT | Performed by: INTERNAL MEDICINE

## 2018-05-14 RX ORDER — LISINOPRIL 5 MG/1
5 TABLET ORAL
Status: DISCONTINUED | OUTPATIENT
Start: 2018-05-14 | End: 2018-05-15 | Stop reason: HOSPADM

## 2018-05-14 RX ADMIN — PANTOPRAZOLE SODIUM 40 MG: 40 TABLET, DELAYED RELEASE ORAL at 06:07

## 2018-05-14 RX ADMIN — LISINOPRIL 5 MG: 5 TABLET ORAL at 09:50

## 2018-05-14 RX ADMIN — LEVOTHYROXINE SODIUM 150 MCG: 150 TABLET ORAL at 06:07

## 2018-05-14 RX ADMIN — ASPIRIN 81 MG 81 MG: 81 TABLET ORAL at 08:09

## 2018-05-14 RX ADMIN — SODIUM CHLORIDE 125 ML/HR: 900 INJECTION, SOLUTION INTRAVENOUS at 02:55

## 2018-05-14 RX ADMIN — METOPROLOL SUCCINATE 25 MG: 25 TABLET, EXTENDED RELEASE ORAL at 18:03

## 2018-05-14 RX ADMIN — TICAGRELOR 90 MG: 90 TABLET ORAL at 20:09

## 2018-05-14 RX ADMIN — ROSUVASTATIN CALCIUM 10 MG: 10 TABLET, FILM COATED ORAL at 20:09

## 2018-05-14 RX ADMIN — TICAGRELOR 90 MG: 90 TABLET ORAL at 08:09

## 2018-05-14 NOTE — PLAN OF CARE
Problem: Patient Care Overview  Goal: Plan of Care Review  Outcome: Ongoing (interventions implemented as appropriate)   05/14/18 2883   Coping/Psychosocial   Plan of Care Reviewed With patient;family   Plan of Care Review   Progress improving   OTHER   Outcome Summary patient transfered from CCU to 3W       Problem: Cardiac Catheterization (Diagnostic/Interventional) (Adult)  Goal: Signs and Symptoms of Listed Potential Problems Will be Absent, Minimized or Managed (Cardiac Catheterization)  Outcome: Ongoing (interventions implemented as appropriate)      Problem: Pain, Acute (Adult)  Goal: Identify Related Risk Factors and Signs and Symptoms  Outcome: Ongoing (interventions implemented as appropriate)

## 2018-05-14 NOTE — PLAN OF CARE
Problem: Patient Care Overview  Goal: Plan of Care Review  Outcome: Ongoing (interventions implemented as appropriate)   05/14/18 0583   Coping/Psychosocial   Plan of Care Reviewed With patient   Plan of Care Review   Progress improving   OTHER   Outcome Summary R groin remains angiosealed soft , nontender,pulses WNL no changes noted, has rested during night.     Goal: Individualization and Mutuality  Outcome: Ongoing (interventions implemented as appropriate)    Goal: Discharge Needs Assessment  Outcome: Ongoing (interventions implemented as appropriate)      Problem: Cardiac Catheterization (Diagnostic/Interventional) (Adult)  Goal: Signs and Symptoms of Listed Potential Problems Will be Absent, Minimized or Managed (Cardiac Catheterization)  Outcome: Ongoing (interventions implemented as appropriate)      Problem: Pain, Acute (Adult)  Goal: Identify Related Risk Factors and Signs and Symptoms  Outcome: Ongoing (interventions implemented as appropriate)    Goal: Acceptable Pain Control/Comfort Level  Outcome: Ongoing (interventions implemented as appropriate)

## 2018-05-14 NOTE — PROGRESS NOTES
[facility]  INPATIENT PROGRESS NOTE    Name: Amee Almonte  Age/Sex: 57 y.o. female  :  1960        PCP: No Known Provider    Vital Signs  Temp:  [97.9 °F (36.6 °C)-99.1 °F (37.3 °C)] 98.1 °F (36.7 °C)  Heart Rate:  [] 89  Resp:  [16-20] 16  BP: (109-161)/(56-89) 109/62  Body mass index is 27.96 kg/m².      Subjective  57-year-old lady who had an acute inferior wall MI and PCI to the mid RCA doing well, there was involvement of the with hypotension responded to IV fluids and her blood pressure is better now her LDL is high, started her on Crestor.  Her groin is okay.  We'll transfer her to telemetry floor and ambulate her      Patient Active Problem List   Diagnosis   • Hypertension   • Acquired hypothyroidism   • Recurrent major depressive disorder, in partial remission   • Smoker   • Inferior MI       Past Medical History:   Diagnosis Date   • Acquired hypothyroidism 2018   • Hypertension    • Recurrent major depressive disorder, in partial remission 2018   • Smoker 2018       Current Facility-Administered Medications   Medication Dose Route Frequency Provider Last Rate Last Dose   • aspirin chewable tablet 81 mg  81 mg Oral Daily Bro Sotelo MD   81 mg at 18 0809   • buPROPion SR (WELLBUTRIN SR) 12 hr tablet 150 mg  150 mg Oral Q12H Bro Sotelo MD       • HYDROmorphone (DILAUDID) injection 0.2 mg  0.2 mg Intravenous Q6H PRN Eros Kyle MD   0.2 mg at 186   • levothyroxine (SYNTHROID, LEVOTHROID) tablet 150 mcg  150 mcg Oral Q AM Bro Sotelo MD   150 mcg at 18 0607   • metoprolol succinate XL (TOPROL-XL) 24 hr tablet 25 mg  25 mg Oral Daily With Dinner Bro Sotelo MD   25 mg at 18 1712   • pantoprazole (PROTONIX) EC tablet 40 mg  40 mg Oral Q AM Bro Sotelo MD   40 mg at 18 0607   • rosuvastatin (CRESTOR) tablet 10 mg  10 mg Oral Nightly Bro Sotelo MD   10 mg at  05/13/18 2002   • sodium chloride 0.9 % infusion  125 mL/hr Intravenous Continuous Bro Sotelo  mL/hr at 05/14/18 0255 125 mL/hr at 05/14/18 0255   • ticagrelor (BRILINTA) tablet 90 mg  90 mg Oral BID Bro Sotelo MD   90 mg at 05/14/18 0809       Past Surgical History:   Procedure Laterality Date   • HYSTERECTOMY  2003   • TUBAL ABDOMINAL LIGATION  1999       Social History     Social History   • Marital status:      Spouse name: N/A   • Number of children: N/A   • Years of education: N/A     Occupational History   • Not on file.     Social History Main Topics   • Smoking status: Current Every Day Smoker   • Smokeless tobacco: Never Used   • Alcohol use Yes   • Drug use: No   • Sexual activity: Yes     Birth control/ protection: None      Comment: Hysterectomy     Other Topics Concern   • Not on file     Social History Narrative   • No narrative on file       O/E    Neck supple  no Jvd , no thyroid enlargement  Chest air entry equal, normal respiration   no rhonchi or creps  Cardiovascular system regular rate and rythem , no murmurs  Abdomen soft no tenderness, bowel sounds present, .  CNS - alert , oriented to place and time  No motor or sensory deficit.  Cranial nerves intact  musculoskeletal no deformity of the back or spine   Extremeties  no edema ,   pulses equal on both side        Lab Results (last 24 hours)     Procedure Component Value Units Date/Time    Troponin [[id number]]  (Abnormal) Collected:  05/13/18 1226    Specimen:  Blood Updated:  05/13/18 1300     Troponin I 1.520 (C) ng/mL     Comprehensive Metabolic Panel [[id number]]  (Abnormal) Collected:  05/13/18 1226    Specimen:  Blood Updated:  05/13/18 1253     Glucose 102 (H) mg/dL      BUN 14 mg/dL      Creatinine 0.81 mg/dL      Sodium 135 (L) mmol/L      Potassium 5.2 (H) mmol/L      Chloride 101 mmol/L      CO2 28.0 mmol/L      Calcium 9.4 mg/dL      Total Protein 6.8 g/dL      Albumin 4.00 g/dL      ALT (SGPT) 35 U/L       AST (SGOT) 48 (H) U/L      Alkaline Phosphatase 79 U/L      Total Bilirubin 0.3 mg/dL      eGFR Non African Amer 73 mL/min/1.73      Globulin 2.8 gm/dL      A/G Ratio 1.4 g/dL      BUN/Creatinine Ratio 17.3     Anion Gap 6.0 mmol/L     CBC (No Diff) [[id number]]  (Abnormal) Collected:  05/13/18 1226    Specimen:  Blood Updated:  05/13/18 1237     WBC 13.43 (H) 10*3/mm3      RBC 4.20 10*6/mm3      Hemoglobin 13.9 g/dL      Hematocrit 40.0 %      MCV 95.2 fL      MCH 33.1 pg      MCHC 34.8 g/dL      RDW 12.8 %      RDW-SD 44.6 fl      MPV 10.0 fL      Platelets 390 10*3/mm3     Lipid Panel [[id number]]  (Abnormal) Collected:  05/13/18 1226    Specimen:  Blood Updated:  05/13/18 1257     Total Cholesterol 223 (H) mg/dL      Triglycerides 66 mg/dL      HDL Cholesterol 58 (L) mg/dL      LDL Cholesterol  143 (H) mg/dL      LDL/HDL Ratio 2.62    CBC (No Diff) [[id number]]  (Abnormal) Collected:  05/14/18 0247    Specimen:  Blood Updated:  05/14/18 0259     WBC 14.22 (H) 10*3/mm3      RBC 3.75 (L) 10*6/mm3      Hemoglobin 12.6 g/dL      Hematocrit 36.4 %      MCV 97.1 fL      MCH 33.6 pg      MCHC 34.6 g/dL      RDW 13.2 %      RDW-SD 46.2 fl      MPV 9.6 fL      Platelets 384 10*3/mm3     Basic Metabolic Panel [[id number]]  (Abnormal) Collected:  05/14/18 0247    Specimen:  Blood Updated:  05/14/18 0319     Glucose 92 mg/dL      BUN 10 mg/dL      Creatinine 0.72 mg/dL      Sodium 136 (L) mmol/L      Potassium 3.8 mmol/L      Chloride 106 mmol/L      CO2 25.0 mmol/L      Calcium 8.6 mg/dL      eGFR Non African Amer 84 mL/min/1.73      BUN/Creatinine Ratio 13.9     Anion Gap 5.0 mmol/L     Troponin [[id number]]  (Abnormal) Collected:  05/14/18 0247    Specimen:  Blood Updated:  05/14/18 0336     Troponin I 9.250 (C) ng/mL             A/P    Status post inferior wall MI with RV involvement with PCI to the mid RCA-will DC the IV fluids continue Toprol-XL, dual antiplatelet therapy with aspirin and Brilinta.    Hyperlipidemia  will continue her Crestor.    Hypothyroidism on Synthroid supplements.    We will ambulate her on the floor.          This document has been electronically signed by Bro Sotelo MD on May 14, 2018 9:05 AM         EMR Dragon/Transcription disclaimer:   Some of this note may be an electronic transcription/translation of spoken language to printed text. The electronic translation of spoken language may permit erroneous, or at times, nonsensical words or phrases to be inadvertently transcribed; Although I have reviewed the note for such errors, some may still exist.

## 2018-05-14 NOTE — PAYOR COMM NOTE
"Amee Albright (57 y.o. Female)     Date of Birth Social Security Number Address Home Phone MRN    1960  [id number] [phone] [id number]    Sikh Marital Status          None        Admission Date Admission Type Admitting Provider Attending Provider Department, Room/Bed    5/13/18 Urgent Bro Sotelo MD Vettiankal, George J, MD [address], 332/1    Discharge Date Discharge Disposition Discharge Destination                       Attending Provider:  Bro Sotelo MD    Allergies:  Iodides    Isolation:  None   Infection:  None   Code Status:  FULL    Ht:  165.1 cm (65\")   Wt:  76.2 kg (167 lb 15.9 oz)    Admission Cmt:  None   Principal Problem:  None                Active Insurance as of 5/13/2018     Primary Coverage     Payor Plan Insurance Group Employer/Plan Group    [employer]HE[employer] BLUE CROSS AN[employer]M BLUE CROSS BLUE SHIELD PPO [id number]     Payor Plan Address Payor Plan Phone Number Effective From Effective To    [address] [phone] 1/1/2015     [place] [address]       Subscriber Name Subscriber Birth Date Member ID       AMEE ALBRIGHT 1960 [id number]                 Emergency Contacts      (Rel.) Home Phone Work Phone Mobile Phone    Yosi Albright (Spouse) [phone] -- --               History & Physical      Bro Sotelo MD at 5/13/2018 11:17 AM            [facility] Cardiology  HISTORY AND PHYSICAL  Amee Albright  57 y.o. female    Chief complaint -severe chest pain      History of present Illness- 57-year-old lady transferred from [facility] emergency room by Dr. Don after she presented there with severe substernal chest pain associated with diaphoresis and shortness of breath with nausea at about 8:00 and was found to have ST elevation in inferolateral leads and was transferred here for possible PCI.  Patient has been having chest pain off and on.  " She works at a bank as a  and she is a smoker smokes about half pack a day.  She has history of hypertension and has history of CAD in her family.        Allergies   Allergen Reactions   • Iodides Swelling and Rash         Past Medical History:   Diagnosis Date   • Acquired hypothyroidism 2/26/2018   • Hypertension    • Recurrent major depressive disorder, in partial remission 2/26/2018   • Smoker 2/26/2018         Past Surgical History:   Procedure Laterality Date   • HYSTERECTOMY  2003   • TUBAL ABDOMINAL LIGATION  1999         Family History   Problem Relation Age of Onset   • Lung cancer Father    • Skin cancer Mother          Social History     Social History   • Marital status:      Spouse name: N/A   • Number of children: N/A   • Years of education: N/A     Occupational History   • Not on file.     Social History Main Topics   • Smoking status: Current Every Day Smoker   • Smokeless tobacco: Never Used   • Alcohol use Yes   • Drug use: No   • Sexual activity: Yes     Birth control/ protection: None      Comment: Hysterectomy     Other Topics Concern   • Not on file     Social History Narrative   • No narrative on file     Family history-premature CAD in her family    Personal history-smokes half a day    Home medications-Synthroid 150 µg daily, Zestoretic 20/12.5 mg daily, Wellbutrin 150 mg twice a day      Review of Systems     Constitution: Denies any fatigue, fever or chills    HENT: Denies any headache, hearing impairment    Eyes: Denies any blurring of vision, or photophobia     Cardivascular - As per history of present illness     Respiratory system-denies any COPD, shortness of breath     Endocrine:  No history of hyperlipidemia, diabetes       Musculoskeletal:  No history of arthritis with musculoskeletal problems    Gastrointestinal: No nausea, vomiting, or melena    Genitourinary: No dysuria or hematuria    Neurological:   No history of seizure disorder, stroke, memory  problems    Psychiatric/Behavioral:        No history of depression, bipolar disorder or schizophrenia     Hematological- no history of easy bruising            OBJECTIVE    There were no vitals taken for this visit.      Physical Exam     Constitutional: is oriented to person, place, and time.     Skin-warm and dry    Well developed and nourished in no acute distress      Head: Normocephalic and atraumatic.     Eyes: Pupils are equal, round, and reactive to light.     Neck: Neck supple. No bruit in the carotids, no elevation of JVD    Cardiovascular: [place] in the fifth intercostal space, regular rate, and  Rhythm,  S1 greater than S2, no S3 or S4, no gallop       Pulmonary/Chest:   Air  Entry is equal on both sides  No wheezing or crackles,      Abdominal: Soft.  No hepatosplenomegaly, bowel sounds are present    Musculoskeletal: No kyphoscoliosis    Neurological: is alert and oriented to person, place, and time.    cranial nerve are intact .   No motor or sensory deficit    Extremities-no edema, no radial femoral delay      Psychiatric: He has a normal mood and affect.                  His behavior is normal.        Lab Results (last 24 hours)     ** No results found for the last 24 hours. **          EKG showed ST elevation 2,3 and aVF with reciprocal changes in inferolateral leads       A/P    Acute inferior wall MI-explain the risk and benefit and patient was taken to the catheter lab emergently for possible PCI as she was having active chest pain with EKG changes.    Hypertension-will hold the blood pressure medicines depending on her blood pressure after the PCI.    Tobacco abuse needs his factor modification.    We will start her on statins.        Bro Sotelo MD  5/13/2018  11:17 AM    EMR Dragon/Transcription disclaimer:   Some of this note may be an electronic transcription/translation of spoken language to printed text. The electronic translation of spoken language may permit erroneous, or at  times, nonsensical words or phrases to be inadvertently transcribed; Although I have reviewed the note for such errors, some may still exist.       Electronically signed by Bro Sotelo MD at 5/13/2018 11:21 AM       Hospital Medications (active)       Dose Frequency Start End    aspirin chewable tablet 81 mg 81 mg Daily 5/13/2018     Sig - Route: Chew 1 tablet Daily. - Oral    HYDROmorphone (DILAUDID) injection 0.2 mg 0.2 mg Every 6 Hours PRN 5/13/2018 5/23/2018    Sig - Route: Infuse 0.2 mL into a venous catheter Every 6 (Six) Hours As Needed for Severe Pain . - Intravenous    levothyroxine (SYNTHROID, LEVOTHROID) tablet 150 mcg 150 mcg Every Early Morning 5/14/2018     Sig - Route: Take 1 tablet by mouth Every Morning. - Oral    lisinopril (PRINIVIL,ZESTRIL) tablet 5 mg 5 mg Every 24 Hours Scheduled 5/14/2018     Sig - Route: Take 1 tablet by mouth Daily. - Oral    metoprolol succinate XL (TOPROL-XL) 24 hr tablet 25 mg 25 mg Daily With Dinner 5/13/2018     Sig - Route: Take 1 tablet by mouth Daily With Dinner. - Oral    pantoprazole (PROTONIX) EC tablet 40 mg 40 mg Every Early Morning 5/14/2018     Sig - Route: Take 1 tablet by mouth Every Morning. - Oral    rosuvastatin (CRESTOR) tablet 10 mg 10 mg Nightly 5/13/2018     Sig - Route: Take 1 tablet by mouth Every Night. - Oral    sodium chloride 0.9 % infusion 125 mL/hr Continuous 5/13/2018     Sig - Route: Infuse 125 mL/hr into a venous catheter Continuous. - Intravenous    ticagrelor (BRILINTA) tablet 90 mg 90 mg 2 Times Daily 5/13/2018     Sig - Route: Take 1 tablet by mouth 2 (Two) Times a Day. - Oral    buPROPion SR (WELLBUTRIN SR) 12 hr tablet 150 mg (Discontinued) 150 mg Every 12 Hours Scheduled 5/13/2018 5/14/2018    Sig - Route: Take 1 tablet by mouth Every 12 (Twelve) Hours. - Oral            Lab Results (last 24 hours)     Procedure Component Value Units Date/Time    Troponin [[id number]]  (Abnormal) Collected:  05/14/18 0247    Specimen:  Blood  Updated:  05/14/18 0336     Troponin I 9.250 (C) ng/mL     Basic Metabolic Panel [[id number]]  (Abnormal) Collected:  05/14/18 0247    Specimen:  Blood Updated:  05/14/18 0319     Glucose 92 mg/dL      BUN 10 mg/dL      Creatinine 0.72 mg/dL      Sodium 136 (L) mmol/L      Potassium 3.8 mmol/L      Chloride 106 mmol/L      CO2 25.0 mmol/L      Calcium 8.6 mg/dL      eGFR Non African Amer 84 mL/min/1.73      BUN/Creatinine Ratio 13.9     Anion Gap 5.0 mmol/L     CBC (No Diff) [[id number]]  (Abnormal) Collected:  05/14/18 0247    Specimen:  Blood Updated:  05/14/18 0259     WBC 14.22 (H) 10*3/mm3      RBC 3.75 (L) 10*6/mm3      Hemoglobin 12.6 g/dL      Hematocrit 36.4 %      MCV 97.1 fL      MCH 33.6 pg      MCHC 34.6 g/dL      RDW 13.2 %      RDW-SD 46.2 fl      MPV 9.6 fL      Platelets 384 10*3/mm3     Troponin [[id number]]  (Abnormal) Collected:  05/13/18 1226    Specimen:  Blood Updated:  05/13/18 1300     Troponin I 1.520 (C) ng/mL     Lipid Panel [[id number]]  (Abnormal) Collected:  05/13/18 1226    Specimen:  Blood Updated:  05/13/18 1257     Total Cholesterol 223 (H) mg/dL      Triglycerides 66 mg/dL      HDL Cholesterol 58 (L) mg/dL      LDL Cholesterol  143 (H) mg/dL      LDL/HDL Ratio 2.62    Comprehensive Metabolic Panel [[id number]]  (Abnormal) Collected:  05/13/18 1226    Specimen:  Blood Updated:  05/13/18 1253     Glucose 102 (H) mg/dL      BUN 14 mg/dL      Creatinine 0.81 mg/dL      Sodium 135 (L) mmol/L      Potassium 5.2 (H) mmol/L      Chloride 101 mmol/L      CO2 28.0 mmol/L      Calcium 9.4 mg/dL      Total Protein 6.8 g/dL      Albumin 4.00 g/dL      ALT (SGPT) 35 U/L      AST (SGOT) 48 (H) U/L      Alkaline Phosphatase 79 U/L      Total Bilirubin 0.3 mg/dL      eGFR Non African Amer 73 mL/min/1.73      Globulin 2.8 gm/dL      A/G Ratio 1.4 g/dL      BUN/Creatinine Ratio 17.3     Anion Gap 6.0 mmol/L     CBC (No Diff) [[id number]]  (Abnormal) Collected:  05/13/18 1226    Specimen:  Blood Updated:   05/13/18 1237     WBC 13.43 (H) 10*3/mm3      RBC 4.20 10*6/mm3      Hemoglobin 13.9 g/dL      Hematocrit 40.0 %      MCV 95.2 fL      MCH 33.1 pg      MCHC 34.8 g/dL      RDW 12.8 %      RDW-SD 44.6 fl      MPV 10.0 fL      Platelets 390 10*3/mm3         Imaging Results (last 24 hours)     ** No results found for the last 24 hours. **        ECG/EMG Results (last 24 hours)     Procedure Component Value Units Date/Time    ECG 12 Lead [[id number]] Collected:  05/14/18 0705     Updated:  05/14/18 0705    Narrative:       Test Reason : ami  Blood Pressure : **/** mmHG  Vent. Rate : 084 BPM     Atrial Rate : 084 BPM     P-R Int : 142 ms          QRS Dur : 094 ms      QT Int : 396 ms       P-R-T Axes : 080 051 -08 degrees     QTc Int : 467 ms    Normal sinus rhythm  Normal ECG  When compared with ECG of 13-MAY-2018 11:46,  Minimal criteria for Anterior infarct are no longer present  Inverted T waves have replaced nonspecific T wave abnormality in Inferior  leads    Referred By:             Confirmed By:     ECG 12 Lead [[id number]] Collected:  05/13/18 1146     Updated:  05/14/18 0823    Narrative:       Test Reason : Post-cath  Blood Pressure : **/** mmHG  Vent. Rate : 083 BPM     Atrial Rate : 083 BPM     P-R Int : 146 ms          QRS Dur : 094 ms      QT Int : 390 ms       P-R-T Axes : 077 051 029 degrees     QTc Int : 458 ms    Normal sinus rhythm with sinus arrhythmia  Cannot rule out Anterior infarct , age undetermined  Abnormal ECG  No previous ECGs available  Confirmed by PER RODRIGUEZ, KRISTY ([id number]),  CHYNA SANCHEZ ([id number]) on  5/14/2018 8:23:31 AM    Referred By:             Confirmed By:KRISTY ALBARADO MD    SCANNED EKG [[id number]] Resulted:  05/13/18      Updated:  05/14/18 1046    SCANNED EKG [[id number]] Resulted:  05/13/18      Updated:  05/14/18 1046             Physician Progress Notes (last 24 hours) (Notes from 5/13/2018 11:44 AM through 5/14/2018 11:44 AM)      Bro Sotelo MD at 5/14/2018  9:05  ELEANOR            [facility] Cardiology  INPATIENT PROGRESS NOTE    Name: Amee Almonte  Age/Sex: 57 y.o. female  :  1960        PCP: No Known Provider    Vital Signs  Temp:  [97.9 °F (36.6 °C)-99.1 °F (37.3 °C)] 98.1 °F (36.7 °C)  Heart Rate:  [] 89  Resp:  [16-20] 16  BP: (109-161)/(56-89) 109/62  Body mass index is 27.96 kg/m².      Subjective  57-year-old lady who had an acute inferior wall MI and PCI to the mid RCA doing well, there was involvement of the with hypotension responded to IV fluids and her blood pressure is better now her LDL is high, started her on Crestor.  Her groin is okay.  We'll transfer her to telemetry floor and ambulate her      Patient Active Problem List   Diagnosis   • Hypertension   • Acquired hypothyroidism   • Recurrent major depressive disorder, in partial remission   • Smoker   • Inferior MI       Past Medical History:   Diagnosis Date   • Acquired hypothyroidism 2018   • Hypertension    • Recurrent major depressive disorder, in partial remission 2018   • Smoker 2018       Current Facility-Administered Medications   Medication Dose Route Frequency Provider Last Rate Last Dose   • aspirin chewable tablet 81 mg  81 mg Oral Daily Bro Sotelo MD   81 mg at 18 0809   • buPROPion SR (WELLBUTRIN SR) 12 hr tablet 150 mg  150 mg Oral Q12H Bro Sotelo MD       • HYDROmorphone (DILAUDID) injection 0.2 mg  0.2 mg Intravenous Q6H PRN Eros Kyle MD   0.2 mg at 186   • levothyroxine (SYNTHROID, LEVOTHROID) tablet 150 mcg  150 mcg Oral Q AM Bro Sotelo MD   150 mcg at 18 0607   • metoprolol succinate XL (TOPROL-XL) 24 hr tablet 25 mg  25 mg Oral Daily With Dinner Bro Sotelo MD   25 mg at 18 1712   • pantoprazole (PROTONIX) EC tablet 40 mg  40 mg Oral Q AM Bro Sotelo MD   40 mg at 18 0607   • rosuvastatin (CRESTOR) tablet 10 mg  10 mg Oral Nightly Bro Sotelo MD    10 mg at 05/13/18 2002   • sodium chloride 0.9 % infusion  125 mL/hr Intravenous Continuous Bro Sotelo  mL/hr at 05/14/18 0255 125 mL/hr at 05/14/18 0255   • ticagrelor (BRILINTA) tablet 90 mg  90 mg Oral BID Bro Sotelo MD   90 mg at 05/14/18 0809       Past Surgical History:   Procedure Laterality Date   • HYSTERECTOMY  2003   • TUBAL ABDOMINAL LIGATION  1999       Social History     Social History   • Marital status:      Spouse name: N/A   • Number of children: N/A   • Years of education: N/A     Occupational History   • Not on file.     Social History Main Topics   • Smoking status: Current Every Day Smoker   • Smokeless tobacco: Never Used   • Alcohol use Yes   • Drug use: No   • Sexual activity: Yes     Birth control/ protection: None      Comment: Hysterectomy     Other Topics Concern   • Not on file     Social History Narrative   • No narrative on file       O/E    Neck supple  no Jvd , no thyroid enlargement  Chest air entry equal, normal respiration   no rhonchi or creps  Cardiovascular system regular rate and rythem , no murmurs  Abdomen soft no tenderness, bowel sounds present, .  CNS - alert , oriented to place and time  No motor or sensory deficit.  Cranial nerves intact  musculoskeletal no deformity of the back or spine   Extremeties  no edema ,   pulses equal on both side        Lab Results (last 24 hours)     Procedure Component Value Units Date/Time    Troponin [[id number]]  (Abnormal) Collected:  05/13/18 1226    Specimen:  Blood Updated:  05/13/18 1300     Troponin I 1.520 (C) ng/mL     Comprehensive Metabolic Panel [[id number]]  (Abnormal) Collected:  05/13/18 1226    Specimen:  Blood Updated:  05/13/18 1253     Glucose 102 (H) mg/dL      BUN 14 mg/dL      Creatinine 0.81 mg/dL      Sodium 135 (L) mmol/L      Potassium 5.2 (H) mmol/L      Chloride 101 mmol/L      CO2 28.0 mmol/L      Calcium 9.4 mg/dL      Total Protein 6.8 g/dL      Albumin 4.00 g/dL      ALT  (SGPT) 35 U/L      AST (SGOT) 48 (H) U/L      Alkaline Phosphatase 79 U/L      Total Bilirubin 0.3 mg/dL      eGFR Non African Amer 73 mL/min/1.73      Globulin 2.8 gm/dL      A/G Ratio 1.4 g/dL      BUN/Creatinine Ratio 17.3     Anion Gap 6.0 mmol/L     CBC (No Diff) [[id number]]  (Abnormal) Collected:  05/13/18 1226    Specimen:  Blood Updated:  05/13/18 1237     WBC 13.43 (H) 10*3/mm3      RBC 4.20 10*6/mm3      Hemoglobin 13.9 g/dL      Hematocrit 40.0 %      MCV 95.2 fL      MCH 33.1 pg      MCHC 34.8 g/dL      RDW 12.8 %      RDW-SD 44.6 fl      MPV 10.0 fL      Platelets 390 10*3/mm3     Lipid Panel [[id number]]  (Abnormal) Collected:  05/13/18 1226    Specimen:  Blood Updated:  05/13/18 1257     Total Cholesterol 223 (H) mg/dL      Triglycerides 66 mg/dL      HDL Cholesterol 58 (L) mg/dL      LDL Cholesterol  143 (H) mg/dL      LDL/HDL Ratio 2.62    CBC (No Diff) [[id number]]  (Abnormal) Collected:  05/14/18 0247    Specimen:  Blood Updated:  05/14/18 0259     WBC 14.22 (H) 10*3/mm3      RBC 3.75 (L) 10*6/mm3      Hemoglobin 12.6 g/dL      Hematocrit 36.4 %      MCV 97.1 fL      MCH 33.6 pg      MCHC 34.6 g/dL      RDW 13.2 %      RDW-SD 46.2 fl      MPV 9.6 fL      Platelets 384 10*3/mm3     Basic Metabolic Panel [[id number]]  (Abnormal) Collected:  05/14/18 0247    Specimen:  Blood Updated:  05/14/18 0319     Glucose 92 mg/dL      BUN 10 mg/dL      Creatinine 0.72 mg/dL      Sodium 136 (L) mmol/L      Potassium 3.8 mmol/L      Chloride 106 mmol/L      CO2 25.0 mmol/L      Calcium 8.6 mg/dL      eGFR Non African Amer 84 mL/min/1.73      BUN/Creatinine Ratio 13.9     Anion Gap 5.0 mmol/L     Troponin [[id number]]  (Abnormal) Collected:  05/14/18 0247    Specimen:  Blood Updated:  05/14/18 0336     Troponin I 9.250 (C) ng/mL             A/P    Status post inferior wall MI with RV involvement with PCI to the mid RCA-will DC the IV fluids continue Toprol-XL, dual antiplatelet therapy with aspirin and  Brilinta.    Hyperlipidemia will continue her Crestor.    Hypothyroidism on Synthroid supplements.    We will ambulate her on the floor.          This document has been electronically signed by Bro Sotelo MD on May 14, 2018 9:05 AM         EMR Dragon/Transcription disclaimer:   Some of this note may be an electronic transcription/translation of spoken language to printed text. The electronic translation of spoken language may permit erroneous, or at times, nonsensical words or phrases to be inadvertently transcribed; Although I have reviewed the note for such errors, some may still exist.       Electronically signed by Bro Sotelo MD at 5/14/2018  9:07 AM       Consult Notes (last 24 hours) (Notes from 5/13/2018 11:44 AM through 5/14/2018 11:44 AM)     No notes of this type exist for this encounter.

## 2018-05-14 NOTE — CONSULTS
Adult Nutrition  Assessment    Patient Name:  Amee Almonte  YOB: 1960  MRN: [id number]  Admit Date:  5/13/2018    Assessment Date:  5/14/2018    Comments:  RD consult for heart healthy diet education, pt s/p MI. Pt reports basic understanding of heart healthy/low na guidelines and was receptive to info RD presented. Diet copies accepted.           Adult Nutrition Assessment     Row Name 05/14/18 1455       Reason for Assessment    Reason For Assessment physician consult    Diagnosis cardiac disease    Identified At Risk by Screening Criteria need for education          Electronically signed by:  Ruthie Babin RD  05/14/18 2:56 PM

## 2018-05-15 VITALS
SYSTOLIC BLOOD PRESSURE: 128 MMHG | WEIGHT: 167.99 LBS | HEIGHT: 65 IN | RESPIRATION RATE: 18 BRPM | TEMPERATURE: 97.8 F | HEART RATE: 91 BPM | OXYGEN SATURATION: 96 % | DIASTOLIC BLOOD PRESSURE: 64 MMHG | BODY MASS INDEX: 27.99 KG/M2

## 2018-05-15 LAB — TROPONIN I SERPL-MCNC: 2.27 NG/ML

## 2018-05-15 PROCEDURE — 99238 HOSP IP/OBS DSCHRG MGMT 30/<: CPT | Performed by: INTERNAL MEDICINE

## 2018-05-15 PROCEDURE — 84484 ASSAY OF TROPONIN QUANT: CPT | Performed by: INTERNAL MEDICINE

## 2018-05-15 RX ORDER — LISINOPRIL 5 MG/1
5 TABLET ORAL
Qty: 30 TABLET | Refills: 12 | Status: SHIPPED | OUTPATIENT
Start: 2018-05-16 | End: 2018-06-11 | Stop reason: SDUPTHER

## 2018-05-15 RX ORDER — ASPIRIN 81 MG/1
81 TABLET, CHEWABLE ORAL DAILY
Qty: 30 TABLET | Refills: 12 | Status: SHIPPED | OUTPATIENT
Start: 2018-05-16 | End: 2019-10-14

## 2018-05-15 RX ORDER — METOPROLOL SUCCINATE 25 MG/1
25 TABLET, EXTENDED RELEASE ORAL
Qty: 30 TABLET | Refills: 12 | Status: SHIPPED | OUTPATIENT
Start: 2018-05-15 | End: 2018-06-11 | Stop reason: SDUPTHER

## 2018-05-15 RX ORDER — ROSUVASTATIN CALCIUM 10 MG/1
10 TABLET, COATED ORAL NIGHTLY
Qty: 30 TABLET | Refills: 12 | Status: SHIPPED | OUTPATIENT
Start: 2018-05-15 | End: 2019-05-20 | Stop reason: SDUPTHER

## 2018-05-15 RX ADMIN — PANTOPRAZOLE SODIUM 40 MG: 40 TABLET, DELAYED RELEASE ORAL at 06:13

## 2018-05-15 RX ADMIN — ASPIRIN 81 MG 81 MG: 81 TABLET ORAL at 08:15

## 2018-05-15 RX ADMIN — LISINOPRIL 5 MG: 5 TABLET ORAL at 08:15

## 2018-05-15 RX ADMIN — TICAGRELOR 90 MG: 90 TABLET ORAL at 08:15

## 2018-05-15 RX ADMIN — LEVOTHYROXINE SODIUM 150 MCG: 150 TABLET ORAL at 06:13

## 2018-05-15 NOTE — PLAN OF CARE
Problem: Patient Care Overview  Goal: Plan of Care Review  Outcome: Ongoing (interventions implemented as appropriate)   05/15/18 0607   Coping/Psychosocial   Plan of Care Reviewed With patient;spouse   Plan of Care Review   Progress no change   OTHER   Outcome Summary minimal reports of pain from groin site (R), VSS, states is feeling much better.        Problem: Cardiac Catheterization (Diagnostic/Interventional) (Adult)  Goal: Signs and Symptoms of Listed Potential Problems Will be Absent, Minimized or Managed (Cardiac Catheterization)  Outcome: Ongoing (interventions implemented as appropriate)      Problem: Pain, Acute (Adult)  Goal: Identify Related Risk Factors and Signs and Symptoms  Outcome: Ongoing (interventions implemented as appropriate)    Goal: Acceptable Pain Control/Comfort Level  Outcome: Ongoing (interventions implemented as appropriate)

## 2018-05-15 NOTE — DISCHARGE SUMMARY
[facility] Cardiology  INPATIENT DISCHARGE SUMMARY    Date of Discharge:  5/15/2018    Discharge Diagnosis: Acute inferior wall MI  Hypertension  Hypothyroidism  Tobacco abuse        Hospital Course  Patient is a 57 y.o. female presented with acute inferior wall MI to [facility].  She was transferred from there is here for emergency PCI and had stent placement to right coronary artery and was electrically stable but did have hypotension due to RV involvement and responded to IV fluids.    Procedures Performed  Procedure(s):  Left Heart Cath  Percutaneous Coronary Intervention to mid RCA       Consults:   Consults     No orders found from 4/14/2018 to 5/14/2018.          Pertinent Test Results:     Lab Results (last 24 hours)     Procedure Component Value Units Date/Time    Troponin [[id number]]  (Abnormal) Collected:  05/15/18 0546    Specimen:  Blood Updated:  05/15/18 0644     Troponin I 2.270 (C) ng/mL           Imaging Results (last 24 hours)     ** No results found for the last 24 hours. **          ECG/EMG Results (last 24 hours)     Procedure Component Value Units Date/Time    SCANNED EKG [[id number]] Resulted:  05/13/18      Updated:  05/14/18 1456    Adult Transthoracic Echo Complete W/ Cont if Necessary Per Protocol [[id number]] Collected:  05/14/18 1104     Updated:  05/14/18 1514     BSA 1.8 m^2      BH CV ECHO BRIAN - RVDD 2.7 cm      IVSd 0.99 cm      LVIDd 4.2 cm      LVIDs 2.9 cm      LVPWd 1.0 cm      IVS/LVPW 0.98     FS 30.8 %      EDV(Teich) 78.1 ml      ESV(Teich) 32.1 ml      EF(Teich) 58.8 %      EDV(cubed) 73.5 ml      ESV(cubed) 24.3 ml      EF(cubed) 66.9 %      LV mass(C)d 136.5 grams      LV mass(C)dI 74.4 grams/m^2      SV(Teich) 45.9 ml      SI(Teich) 25.0 ml/m^2      SV(cubed) 49.2 ml      SI(cubed) 26.8 ml/m^2      Ao root diam 3.4 cm      Ao root area 8.9 cm^2      ACS 2.0 cm      LA dimension 4.0 cm      LA/Ao 1.2     LVOT diam 1.8 cm      LVOT area 2.4  cm^2      Ao root area (BSA corrected) 1.8     MV E max lucas 94.8 cm/sec      MV A max lucas 79.2 cm/sec      MV E/A 1.2     MV V2 max 114.0 cm/sec      MV max PG 5.2 mmHg      MV V2 mean 72.7 cm/sec      MV mean PG 2.3 mmHg      MV V2 VTI 25.2 cm      MVA(VTI) 2.0 cm^2      MV P1/2t max lucas 110.0 cm/sec      Ao pk lucas 141.0 cm/sec      Ao max PG 8.0 mmHg      Ao max PG (full) 2.6 mmHg      Ao V2 mean 89.2 cm/sec      Ao mean PG 3.6 mmHg      Ao mean PG (full) 0.94 mmHg      Ao V2 VTI 24.9 cm      YOHANA(I,A) 2.0 cm^2      YOHANA(I,D) 2.0 cm^2      YOHANA(V,A) 2.0 cm^2      YOHANA(V,D) 2.0 cm^2      LV V1 max PG 5.4 mmHg      LV V1 mean PG 2.7 mmHg      LV V1 max 116.0 cm/sec      LV V1 mean 76.0 cm/sec      LV V1 VTI 20.2 cm      MR max lucas 439.7 cm/sec      MR max PG 77.3 mmHg      SV(Ao) 220.3 ml      SI(Ao) 120.1 ml/m^2      SV(LVOT) 49.3 ml      SI(LVOT) 26.9 ml/m^2      PA V2 max 74.6 cm/sec      PA max PG 2.2 mmHg      RAP systole 10.0 mmHg      MVA P1/2T LCG 2.0 cm^2       CV ECHO BRIAN - BZI_BMI 27.9 kilograms/m^2       CV ECHO BRIAN - BSA(HAYCOCK) 1.9 m^2       CV ECHO BRIAN - BZI_METRIC_WEIGHT 76.0 kg       CV ECHO BRIAN - BZI_METRIC_HEIGHT 165.0 cm      Target HR (85%) 140 bpm      Max. Pred. HR (100%) 165 bpm      Echo EF Estimated 60 %     Narrative:       · Mild tricuspid valve regurgitation is present.  · Left atrial cavity size is mildly dilated.  · Left ventricular systolic function is normal. Estimated EF = 60%.  · The following left ventricular wall segments are hypokinetic: apical   inferior and apex hypokinetic.       ECG 12 Lead [[id number]] Collected:  05/14/18 0705     Updated:  05/14/18 1637    Narrative:       Test Reason : ami  Blood Pressure : **/** mmHG  Vent. Rate : 084 BPM     Atrial Rate : 084 BPM     P-R Int : 142 ms          QRS Dur : 094 ms      QT Int : 396 ms       P-R-T Axes : 080 051 -08 degrees     QTc Int : 467 ms    Normal sinus rhythm  Normal ECG  When compared with ECG of 13-MAY-2018  11:46,  Minimal criteria for Anterior infarct are no longer present  Inverted T waves have replaced nonspecific T wave abnormality in Inferior  leads  Confirmed by PER RODRIGUEZ, KRISTY ([id number]),  BRANDY MICHELLE ([id number]) on  5/14/2018 4:37:39 PM    Referred By:             Confirmed By:KRISTY ALBARADO MD    SCANNED EKG [[id number]] Resulted:  05/13/18      Updated:  05/14/18 2239    SCANNED EKG [[id number]] Resulted:  05/13/18      Updated:  05/14/18 2239          Condition on Discharge:  stable    Vital Signs  Temp:  [97.1 °F (36.2 °C)-97.9 °F (36.6 °C)] 97.9 °F (36.6 °C)  Heart Rate:  [77-90] 90  Resp:  [18] 18  BP: (116-135)/(66-72) 120/66    Discharge Disposition  Home or Self Care    Discharge Medications   Amee Almonte   Home Medication Instructions PREET:[id number]    Printed on:05/15/18 1111   Medication Information                      aspirin 81 MG chewable tablet  Chew 1 tablet Daily.             levothyroxine (SYNTHROID, LEVOTHROID) 150 MCG tablet  TAKE 1 TABLET BY MOUTH EVERY DAY (MUST CALL DOCTOR FOR FURTHER REFILLS)             lisinopril (PRINIVIL,ZESTRIL) 5 MG tablet  Take 1 tablet by mouth Daily.             Loratadine (CLARITIN PO)  Take 1 tablet by mouth Daily.             metoprolol succinate XL (TOPROL-XL) 25 MG 24 hr tablet  Take 1 tablet by mouth Daily With Dinner.             rosuvastatin (CRESTOR) 10 MG tablet  Take 1 tablet by mouth Every Night.             ticagrelor (BRILINTA) 90 MG tablet tablet  Take 1 tablet by mouth 2 (Two) Times a Day.                 Discharge Diet:  cardiac diet    Activity at Discharge: as tolerated    Follow-up Appointments  Future Appointments  Date Time Provider Department Center   6/13/2018 2:45 PM Carlos Max MD [facility] None         Test Results Pending at Discharge        Bro Sotelo MD    EMR Dragon/Transcription disclaimer:   Some of this note may be an electronic transcription/translation of spoken language to printed text. The electronic  translation of spoken language may permit erroneous, or at times, nonsensical words or phrases to be inadvertently transcribed; Although I have reviewed the note for such errors, some may still exist.

## 2018-05-16 NOTE — PAYOR COMM NOTE
"Amee Albright (57 y.o. Female)     Date of Birth Social Security Number Address Home Phone MRN    1960  [id number] [address] [phone] [id number]    Yazdanism Marital Status          None        Admission Date Admission Type Admitting Provider Attending Provider Department, Room/Bed    5/13/18 Urgent Bro Sotelo MD  [address], [facility]/1    Discharge Date Discharge Disposition Discharge Destination        5/15/2018 Home or Self Care              Attending Provider:  (none)   Allergies:  Iodides    Isolation:  None   Infection:  None   Code Status:  Prior    Ht:  165.1 cm (65\")   Wt:  76.2 kg (167 lb 15.9 oz)    Admission Cmt:  None   Principal Problem:  None                Active Insurance as of 5/13/2018     Primary Coverage     Payor Plan Insurance Group Employer/Plan Group    [employer] PPO [id number]     Payor Plan Address Payor Plan Phone Number Effective From Effective To    [address] [phone] 1/1/2015     [facility] [id number]       Subscriber Name Subscriber Birth Date Member ID       AMEE ALBRIGHT 1960 [id number]                 Emergency Contacts      (Rel.) Home Phone Work Phone Mobile Phone    Yosi Albright (Spouse) [phone] -- --               Discharge Summary      Bro Sotelo MD at 5/15/2018 11:11 AM            [facility] Cardiology  INPATIENT DISCHARGE SUMMARY    Date of Discharge:  5/15/2018    Discharge Diagnosis: Acute inferior wall MI  Hypertension  Hypothyroidism  Tobacco abuse        Hospital Course  Patient is a 57 y.o. female presented with acute inferior wall MI to [facility].  She was transferred from there is here for emergency PCI and had stent placement to right coronary artery and was electrically stable but did have hypotension due to RV involvement and responded to IV fluids.    Procedures " Performed  Procedure(s):  Left Heart Cath  Percutaneous Coronary Intervention to mid RCA       Consults:   Consults     No orders found from 4/14/2018 to 5/14/2018.          Pertinent Test Results:     Lab Results (last 24 hours)     Procedure Component Value Units Date/Time    Troponin [[id number]]  (Abnormal) Collected:  05/15/18 0546    Specimen:  Blood Updated:  05/15/18 0644     Troponin I 2.270 (C) ng/mL           Imaging Results (last 24 hours)     ** No results found for the last 24 hours. **          ECG/EMG Results (last 24 hours)     Procedure Component Value Units Date/Time    SCANNED EKG [[id number]] Resulted:  05/13/18      Updated:  05/14/18 1456    Adult Transthoracic Echo Complete W/ Cont if Necessary Per Protocol [[id number]] Collected:  05/14/18 1104     Updated:  05/14/18 1514     BSA 1.8 m^2       CV ECHO BRIAN - RVDD 2.7 cm      IVSd 0.99 cm      LVIDd 4.2 cm      LVIDs 2.9 cm      LVPWd 1.0 cm      IVS/LVPW 0.98     FS 30.8 %      EDV(Teich) 78.1 ml      ESV(Teich) 32.1 ml      EF(Teich) 58.8 %      EDV(cubed) 73.5 ml      ESV(cubed) 24.3 ml      EF(cubed) 66.9 %      LV mass(C)d 136.5 grams      LV mass(C)dI 74.4 grams/m^2      SV(Teich) 45.9 ml      SI(Teich) 25.0 ml/m^2      SV(cubed) 49.2 ml      SI(cubed) 26.8 ml/m^2      Ao root diam 3.4 cm      Ao root area 8.9 cm^2      ACS 2.0 cm      LA dimension 4.0 cm      LA/Ao 1.2     LVOT diam 1.8 cm      LVOT area 2.4 cm^2      Ao root area (BSA corrected) 1.8     MV E max lucas 94.8 cm/sec      MV A max lucas 79.2 cm/sec      MV E/A 1.2     MV V2 max 114.0 cm/sec      MV max PG 5.2 mmHg      MV V2 mean 72.7 cm/sec      MV mean PG 2.3 mmHg      MV V2 VTI 25.2 cm      MVA(VTI) 2.0 cm^2      MV P1/2t max lucas 110.0 cm/sec      Ao pk lucas 141.0 cm/sec      Ao max PG 8.0 mmHg      Ao max PG (full) 2.6 mmHg      Ao V2 mean 89.2 cm/sec      Ao mean PG 3.6 mmHg      Ao mean PG (full) 0.94 mmHg      Ao V2 VTI 24.9 cm      YOHANA(I,A) 2.0 cm^2      YOHANA(I,D) 2.0  cm^2      YOHANA(V,A) 2.0 cm^2      YOHANA(V,D) 2.0 cm^2      LV V1 max PG 5.4 mmHg      LV V1 mean PG 2.7 mmHg      LV V1 max 116.0 cm/sec      LV V1 mean 76.0 cm/sec      LV V1 VTI 20.2 cm      MR max lucas 439.7 cm/sec      MR max PG 77.3 mmHg      SV(Ao) 220.3 ml      SI(Ao) 120.1 ml/m^2      SV(LVOT) 49.3 ml      SI(LVOT) 26.9 ml/m^2      PA V2 max 74.6 cm/sec      PA max PG 2.2 mmHg      RAP systole 10.0 mmHg      MVA P1/2T LCG 2.0 cm^2       CV ECHO BRIAN - BZI_BMI 27.9 kilograms/m^2       CV ECHO BRIAN - BSA(HAYCOCK) 1.9 m^2       CV ECHO BRIAN - BZI_METRIC_WEIGHT 76.0 kg       CV ECHO BRIAN - BZI_METRIC_HEIGHT 165.0 cm      Target HR (85%) 140 bpm      Max. Pred. HR (100%) 165 bpm      Echo EF Estimated 60 %     Narrative:       · Mild tricuspid valve regurgitation is present.  · Left atrial cavity size is mildly dilated.  · Left ventricular systolic function is normal. Estimated EF = 60%.  · The following left ventricular wall segments are hypokinetic: apical   inferior and apex hypokinetic.       ECG 12 Lead [[id number]] Collected:  05/14/18 0705     Updated:  05/14/18 1637    Narrative:       Test Reason : ami  Blood Pressure : **/** mmHG  Vent. Rate : 084 BPM     Atrial Rate : 084 BPM     P-R Int : 142 ms          QRS Dur : 094 ms      QT Int : 396 ms       P-R-T Axes : 080 051 -08 degrees     QTc Int : 467 ms    Normal sinus rhythm  Normal ECG  When compared with ECG of 13-MAY-2018 11:46,  Minimal criteria for Anterior infarct are no longer present  Inverted T waves have replaced nonspecific T wave abnormality in Inferior  leads  Confirmed by KRISTY ALBARADO MD (192),  BRANDY MICHELLE (81) on  5/14/2018 4:37:39 PM    Referred By:             Confirmed By:KRISTY ALBARADO MD    SCANNED EKG [[id number]] Resulted:  05/13/18      Updated:  05/14/18 2239    SCANNED EKG [[id number]] Resulted:  05/13/18      Updated:  05/14/18 2239          Condition on Discharge:  stable    Vital Signs  Temp:  [97.1 °F (36.2  °C)-97.9 °F (36.6 °C)] 97.9 °F (36.6 °C)  Heart Rate:  [77-90] 90  Resp:  [18] 18  BP: (116-135)/(66-72) 120/66    Discharge Disposition  Home or Self Care    Discharge Medications   Amee Almonte   Home Medication Instructions PREET:[id number]    Printed on:05/15/18 1111   Medication Information                      aspirin 81 MG chewable tablet  Chew 1 tablet Daily.             levothyroxine (SYNTHROID, LEVOTHROID) 150 MCG tablet  TAKE 1 TABLET BY MOUTH EVERY DAY (MUST CALL DOCTOR FOR FURTHER REFILLS)             lisinopril (PRINIVIL,ZESTRIL) 5 MG tablet  Take 1 tablet by mouth Daily.             Loratadine (CLARITIN PO)  Take 1 tablet by mouth Daily.             metoprolol succinate XL (TOPROL-XL) 25 MG 24 hr tablet  Take 1 tablet by mouth Daily With Dinner.             rosuvastatin (CRESTOR) 10 MG tablet  Take 1 tablet by mouth Every Night.             ticagrelor (BRILINTA) 90 MG tablet tablet  Take 1 tablet by mouth 2 (Two) Times a Day.                 Discharge Diet:  cardiac diet    Activity at Discharge: as tolerated    Follow-up Appointments  Future Appointments  Date Time Provider Department Center   6/13/2018 2:45 PM Carlos Max MD [facility] None         Test Results Pending at Discharge        Bro Sotelo MD    EMR Dragon/Transcription disclaimer:   Some of this note may be an electronic transcription/translation of spoken language to printed text. The electronic translation of spoken language may permit erroneous, or at times, nonsensical words or phrases to be inadvertently transcribed; Although I have reviewed the note for such errors, some may still exist.     Electronically signed by Bro Sotelo MD at 5/15/2018 11:12 AM

## 2018-05-17 ENCOUNTER — DOCUMENTATION (OUTPATIENT)
Dept: CARDIOLOGY | Facility: CLINIC | Age: 58
End: 2018-05-17

## 2018-06-11 ENCOUNTER — OFFICE VISIT (OUTPATIENT)
Dept: CARDIOLOGY | Facility: CLINIC | Age: 58
End: 2018-06-11

## 2018-06-11 VITALS
WEIGHT: 167 LBS | DIASTOLIC BLOOD PRESSURE: 60 MMHG | BODY MASS INDEX: 27.82 KG/M2 | HEIGHT: 65 IN | OXYGEN SATURATION: 99 % | SYSTOLIC BLOOD PRESSURE: 112 MMHG | HEART RATE: 87 BPM

## 2018-06-11 DIAGNOSIS — E78.00 PURE HYPERCHOLESTEROLEMIA: ICD-10-CM

## 2018-06-11 DIAGNOSIS — E03.9 ACQUIRED HYPOTHYROIDISM: Chronic | ICD-10-CM

## 2018-06-11 DIAGNOSIS — I10 ESSENTIAL HYPERTENSION: Primary | Chronic | ICD-10-CM

## 2018-06-11 DIAGNOSIS — I25.10 CORONARY ARTERY DISEASE INVOLVING NATIVE CORONARY ARTERY OF NATIVE HEART WITHOUT ANGINA PECTORIS: ICD-10-CM

## 2018-06-11 PROCEDURE — 99214 OFFICE O/P EST MOD 30 MIN: CPT | Performed by: INTERNAL MEDICINE

## 2018-06-11 RX ORDER — UBIDECARENONE 75 MG
50 CAPSULE ORAL DAILY
COMMUNITY
End: 2020-01-22

## 2018-06-11 RX ORDER — METOPROLOL SUCCINATE 50 MG/1
50 TABLET, EXTENDED RELEASE ORAL
Qty: 30 TABLET | Refills: 6 | Status: SHIPPED | OUTPATIENT
Start: 2018-06-11 | End: 2018-11-30 | Stop reason: SDUPTHER

## 2018-06-11 RX ORDER — LISINOPRIL 2.5 MG/1
2.5 TABLET ORAL
Qty: 30 TABLET | Refills: 5 | Status: SHIPPED | OUTPATIENT
Start: 2018-06-11 | End: 2018-09-13 | Stop reason: SDUPTHER

## 2018-06-11 NOTE — PROGRESS NOTES
[facility] Cardiology  OFFICE NOTE    Amee Almonte  58 y.o. female    06/11/2018  1. Essential hypertension    2. Acquired hypothyroidism    3. Coronary artery disease involving native coronary artery of native heart without angina pectoris    4. Pure hypercholesterolemia        Chief complaint -Follow-up status post inferior wall MI      History of present Illness- 58-year-old lady who works at the [employer] at [place] had acute inferior wall MI had stent placement to mid right coronary artery, doing well she has some element of depression.  I asked her to start exercising 30-40 minutes daily.  She does have a heart rate goes up with moderate activity.  I decrease the dose of lisinopril to 2.5 mg and increased the Toprol to 50 mg.  if the blood pressure gets low as her to stop the lisinopril.  She has quit smoking completely and she staying on all the medicines              Allergies   Allergen Reactions   • Iodine Other (See Comments)   • Iodides Swelling and Rash         Past Medical History:   Diagnosis Date   • Acquired hypothyroidism 2/26/2018   • Hypertension    • Recurrent major depressive disorder, in partial remission 2/26/2018   • Smoker 2/26/2018         Past Surgical History:   Procedure Laterality Date   • CARDIAC CATHETERIZATION N/A 5/13/2018    Procedure: Left Heart Cath;  Surgeon: Bro Sotelo MD;  Location: [facility] CATH INVASIVE LOCATION;  Service: Cardiology   • HYSTERECTOMY  2003   • NC RT/LT HEART CATHETERS N/A 5/13/2018    Procedure: Percutaneous Coronary Intervention;  Surgeon: Bro Sotelo MD;  Location: [facility] CATH INVASIVE LOCATION;  Service: Cardiology   • TUBAL ABDOMINAL LIGATION  1999         Family History   Problem Relation Age of Onset   • Lung cancer Father    • Skin cancer Mother          Social History     Social History   • Marital status:      Spouse name: N/A   • Number of children: N/A   • Years of education: N/A     Occupational History    • Not on file.     Social History Main Topics   • Smoking status: Current Every Day Smoker   • Smokeless tobacco: Never Used   • Alcohol use Yes   • Drug use: No   • Sexual activity: Yes     Birth control/ protection: None      Comment: Hysterectomy     Other Topics Concern   • Not on file     Social History Narrative   • No narrative on file         Current Outpatient Prescriptions   Medication Sig Dispense Refill   • aspirin 81 MG chewable tablet Chew 1 tablet Daily. 30 tablet 12   • Cholecalciferol (VITAMIN D3) 5000 units capsule capsule Take 5,000 Units by mouth Daily.     • Dietary Management Product (ZYVIT PO) Take  by mouth.     • levothyroxine (SYNTHROID, LEVOTHROID) 150 MCG tablet TAKE 1 TABLET BY MOUTH EVERY DAY (MUST CALL DOCTOR FOR FURTHER REFILLS) 30 tablet 12   • lisinopril (PRINIVIL,ZESTRIL) 2.5 MG tablet Take 1 tablet by mouth Daily. 30 tablet 5   • Loratadine (CLARITIN PO) Take 1 tablet by mouth Daily.     • metoprolol succinate XL (TOPROL-XL) 50 MG 24 hr tablet Take 1 tablet by mouth Daily With Dinner. 30 tablet 6   • Potassium 75 MG tablet Take  by mouth.     • rosuvastatin (CRESTOR) 10 MG tablet Take 1 tablet by mouth Every Night. 30 tablet 12   • ticagrelor (BRILINTA) 90 MG tablet tablet Take 1 tablet by mouth 2 (Two) Times a Day. 60 tablet 12   • vitamin B-12 (CYANOCOBALAMIN) 100 MCG tablet Take 50 mcg by mouth Daily.       No current facility-administered medications for this visit.          Review of Systems     Constitution: Denies any fatigue, fever or chills    HENT: Denies any headache, hearing impairment,     Eyes: Denies any blurring of vision, or photophobia     Cardivascular - As per history of present illness     Respiratory system-denies any COPD, shortness of breath,   sleep apnea.     Endocrine:   history of hyperlipidemia                      Hypothyrodism       Musculoskeletal:  No history of arthritis with musculoskeletal problems    Gastrointestinal: No nausea, vomiting, or  "melena    Genitourinary: No dysuria or hematuria    Neurological:   No history of seizure disorder, stroke, memory problems    Psychiatric/Behavioral:        No history of depression    Hematological- no history of easy bruising or any bleeding diathesis            OBJECTIVE    /60 (BP Location: Left arm, Patient Position: Sitting)   Pulse 87   Ht 165.1 cm (65\")   Wt 75.8 kg (167 lb)   SpO2 99%   BMI 27.79 kg/m²       Physical Exam     Constitutional: is oriented to person, place, and time.     Skin-warm and dry    Well developed and nourished in no acute distress      Head: Normocephalic and atraumatic.     Eyes: Pupils are equal    Neck: Neck supple. No bruit in the carotids    Cardiovascular: [place] in the fifth intercostal space   Regular rate, and  Rhythm,    S1 greater than S2, no S3 or S4, no gallop     Pulmonary/Chest:   Air  Entry is equal on both sides  No wheezing or crackles,      Abdominal: Soft.  No hepatosplenomegaly, bowel sounds are present    Musculoskeletal: No kyphoscoliosis, no significant thickening of the joints    Neurological: is alert and oriented to person, place, and time.    cranial nerve are intact .   No motor or sensory deficit    Extremities-no edema, no radial femoral delay      Psychiatric: He has a normal mood and affect.                  His behavior is normal.           Procedures      Lab Results   Component Value Date    WBC 14.22 (H) 05/14/2018    HGB 12.6 05/14/2018    HCT 36.4 05/14/2018    MCV 97.1 05/14/2018     05/14/2018     Lab Results   Component Value Date    GLUCOSE 92 05/14/2018    BUN 10 05/14/2018    CREATININE 0.72 05/14/2018    EGFRIFNONA 84 05/14/2018    BCR 13.9 05/14/2018    CO2 25.0 05/14/2018    CALCIUM 8.6 05/14/2018    ALBUMIN 4.00 05/13/2018    LABIL2 1.4 05/13/2018    AST 48 (H) 05/13/2018    ALT 35 05/13/2018     Lab Results   Component Value Date    CHOL 223 (H) 05/13/2018    CHOL 241 (H) 04/24/2018     Lab Results   Component Value " Date    TRIG 66 05/13/2018    TRIG 208 (H) 04/24/2018     Lab Results   Component Value Date    HDL 58 (L) 05/13/2018    HDL 57 (L) 04/24/2018     No components found for: LDLCALC  Lab Results   Component Value Date     (H) 05/13/2018     (H) 04/24/2018     No results found for: HDLLDLRATIO  No components found for: CHOLHDL  No results found for: HGBA1C  Lab Results   Component Value Date    TSH 0.169 (L) 04/24/2018    [id number] 9.8 04/24/2018                  A/P  Status post inferior wall MI with stent placement to mid right coronary artery  in May 2018 on dual antiplatelet therapy with aspirin and Brilinta.  Doing well no chest pain or shortness of breath.    Hypertension decrease the dose of lisinopril 2.5 mg and increase Toprol to 50 mg she gets tachycardia occasionally.    Hyper lipidemia on Crestor 10 mg daily    Hypothyroidism on Synthroid supplements followed by Dr. Veloz.    She will have her labs checked again in one month.    Follow-up in 3 months.  Spent greater than 35 minutes answering all her questions and concerns              This document has been electronically signed by Bro Sotelo MD on June 11, 2018 12:10 PM       EMR Dragon/Transcription disclaimer:   Some of this note may be an electronic transcription/translation of spoken language to printed text. The electronic translation of spoken language may permit erroneous, or at times, nonsensical words or phrases to be inadvertently transcribed; Although I have reviewed the note for such errors, some may still exist.

## 2018-06-13 ENCOUNTER — OFFICE VISIT (OUTPATIENT)
Dept: OBSTETRICS AND GYNECOLOGY | Facility: CLINIC | Age: 58
End: 2018-06-13

## 2018-06-13 VITALS
SYSTOLIC BLOOD PRESSURE: 139 MMHG | BODY MASS INDEX: 27.49 KG/M2 | DIASTOLIC BLOOD PRESSURE: 86 MMHG | HEIGHT: 65 IN | WEIGHT: 165 LBS

## 2018-06-13 DIAGNOSIS — I21.19 INFERIOR MI (HCC): ICD-10-CM

## 2018-06-13 DIAGNOSIS — I10 ESSENTIAL HYPERTENSION: ICD-10-CM

## 2018-06-13 DIAGNOSIS — F17.200 SMOKER: ICD-10-CM

## 2018-06-13 DIAGNOSIS — F33.41 RECURRENT MAJOR DEPRESSIVE DISORDER, IN PARTIAL REMISSION (HCC): ICD-10-CM

## 2018-06-13 DIAGNOSIS — E03.9 ACQUIRED HYPOTHYROIDISM: ICD-10-CM

## 2018-06-13 PROCEDURE — 99214 OFFICE O/P EST MOD 30 MIN: CPT | Performed by: OBSTETRICS & GYNECOLOGY

## 2018-06-13 RX ORDER — BUPROPION HYDROCHLORIDE 150 MG/1
150 TABLET, EXTENDED RELEASE ORAL 2 TIMES DAILY
Qty: 60 TABLET | Refills: 12 | Status: SHIPPED | OUTPATIENT
Start: 2018-06-13 | End: 2019-04-24 | Stop reason: SDUPTHER

## 2018-06-13 RX ORDER — MULTIVIT34/FOLIC AC/NADH/COQ10 1-5-50 MG
1 TABLET ORAL 2 TIMES DAILY
Refills: 11 | COMMUNITY
Start: 2018-06-04 | End: 2020-01-22

## 2018-07-16 ENCOUNTER — LAB (OUTPATIENT)
Dept: LAB | Facility: CLINIC | Age: 58
End: 2018-07-16

## 2018-07-16 DIAGNOSIS — E03.9 ACQUIRED HYPOTHYROIDISM: ICD-10-CM

## 2018-07-16 DIAGNOSIS — I10 ESSENTIAL HYPERTENSION: ICD-10-CM

## 2018-07-16 DIAGNOSIS — I21.19 INFERIOR MI (HCC): ICD-10-CM

## 2018-07-16 DIAGNOSIS — F33.41 RECURRENT MAJOR DEPRESSIVE DISORDER, IN PARTIAL REMISSION (HCC): ICD-10-CM

## 2018-07-16 DIAGNOSIS — F17.200 SMOKER: ICD-10-CM

## 2018-07-16 LAB
25(OH)D3 SERPL-MCNC: 46.7 NG/ML (ref 30–100)
ALBUMIN SERPL-MCNC: 4.4 G/DL (ref 3.4–4.8)
ALBUMIN/GLOB SERPL: 1.6 G/DL (ref 1.1–1.8)
ALP SERPL-CCNC: 74 U/L (ref 38–126)
ALT SERPL W P-5'-P-CCNC: 66 U/L (ref 9–52)
ANION GAP SERPL CALCULATED.3IONS-SCNC: 7 MMOL/L (ref 5–15)
ARTICHOKE IGE QN: 59 MG/DL (ref 1–129)
AST SERPL-CCNC: 49 U/L (ref 14–36)
BASOPHILS # BLD AUTO: 0.05 10*3/MM3 (ref 0–0.2)
BASOPHILS NFR BLD AUTO: 0.6 % (ref 0–2)
BILIRUB SERPL-MCNC: 0.3 MG/DL (ref 0.2–1.3)
BUN BLD-MCNC: 13 MG/DL (ref 7–21)
BUN/CREAT SERPL: 17.1 (ref 7–25)
CALCIUM SPEC-SCNC: 9.8 MG/DL (ref 8.4–10.2)
CHLORIDE SERPL-SCNC: 104 MMOL/L (ref 95–110)
CHOLEST SERPL-MCNC: 150 MG/DL (ref 0–199)
CO2 SERPL-SCNC: 26 MMOL/L (ref 22–31)
CREAT BLD-MCNC: 0.76 MG/DL (ref 0.5–1)
DEPRECATED RDW RBC AUTO: 43.3 FL (ref 36.4–46.3)
EOSINOPHIL # BLD AUTO: 0.14 10*3/MM3 (ref 0–0.7)
EOSINOPHIL NFR BLD AUTO: 1.6 % (ref 0–7)
ERYTHROCYTE [DISTWIDTH] IN BLOOD BY AUTOMATED COUNT: 12.3 % (ref 11.5–14.5)
GFR SERPL CREATININE-BSD FRML MDRD: 79 ML/MIN/1.73 (ref 51–120)
GLOBULIN UR ELPH-MCNC: 2.7 GM/DL (ref 2.3–3.5)
GLUCOSE BLD-MCNC: 85 MG/DL (ref 60–100)
HCT VFR BLD AUTO: 41.2 % (ref 35–45)
HDLC SERPL-MCNC: 73 MG/DL (ref 60–200)
HGB BLD-MCNC: 13.5 G/DL (ref 12–15.5)
IMM GRANULOCYTES # BLD: 0.02 10*3/MM3 (ref 0–0.02)
IMM GRANULOCYTES NFR BLD: 0.2 % (ref 0–0.5)
LDLC/HDLC SERPL: 0.76 {RATIO} (ref 0–3.22)
LYMPHOCYTES # BLD AUTO: 2.21 10*3/MM3 (ref 0.6–4.2)
LYMPHOCYTES NFR BLD AUTO: 25.3 % (ref 10–50)
MCH RBC QN AUTO: 31.8 PG (ref 26.5–34)
MCHC RBC AUTO-ENTMCNC: 32.8 G/DL (ref 31.4–36)
MCV RBC AUTO: 97.2 FL (ref 80–98)
MONOCYTES # BLD AUTO: 0.66 10*3/MM3 (ref 0–0.9)
MONOCYTES NFR BLD AUTO: 7.6 % (ref 0–12)
NEUTROPHILS # BLD AUTO: 5.66 10*3/MM3 (ref 2–8.6)
NEUTROPHILS NFR BLD AUTO: 64.7 % (ref 37–80)
PLATELET # BLD AUTO: 431 10*3/MM3 (ref 150–450)
PMV BLD AUTO: 10.4 FL (ref 8–12)
POTASSIUM BLD-SCNC: 4.8 MMOL/L (ref 3.5–5.1)
PROT SERPL-MCNC: 7.1 G/DL (ref 6.3–8.6)
RBC # BLD AUTO: 4.24 10*6/MM3 (ref 3.77–5.16)
SODIUM BLD-SCNC: 137 MMOL/L (ref 137–145)
TRIGL SERPL-MCNC: 108 MG/DL (ref 20–199)
WBC NRBC COR # BLD: 8.74 10*3/MM3 (ref 3.2–9.8)

## 2018-07-16 PROCEDURE — 36415 COLL VENOUS BLD VENIPUNCTURE: CPT | Performed by: OBSTETRICS & GYNECOLOGY

## 2018-07-16 PROCEDURE — 80061 LIPID PANEL: CPT | Performed by: OBSTETRICS & GYNECOLOGY

## 2018-07-16 PROCEDURE — 82306 VITAMIN D 25 HYDROXY: CPT | Performed by: OBSTETRICS & GYNECOLOGY

## 2018-07-16 PROCEDURE — 84443 ASSAY THYROID STIM HORMONE: CPT | Performed by: OBSTETRICS & GYNECOLOGY

## 2018-07-16 PROCEDURE — 80053 COMPREHEN METABOLIC PANEL: CPT | Performed by: OBSTETRICS & GYNECOLOGY

## 2018-07-16 PROCEDURE — 84439 ASSAY OF FREE THYROXINE: CPT

## 2018-07-16 PROCEDURE — 85025 COMPLETE CBC W/AUTO DIFF WBC: CPT | Performed by: OBSTETRICS & GYNECOLOGY

## 2018-07-18 LAB
T4 FREE SERPL-MCNC: 2.16 NG/DL (ref 0.82–1.77)
TSH SERPL-ACNC: 0.01 UIU/ML (ref 0.45–4.5)

## 2018-07-30 ENCOUNTER — OFFICE VISIT (OUTPATIENT)
Dept: OBSTETRICS AND GYNECOLOGY | Facility: CLINIC | Age: 58
End: 2018-07-30

## 2018-07-30 VITALS
DIASTOLIC BLOOD PRESSURE: 70 MMHG | BODY MASS INDEX: 27.82 KG/M2 | HEIGHT: 65 IN | WEIGHT: 167 LBS | SYSTOLIC BLOOD PRESSURE: 120 MMHG

## 2018-07-30 DIAGNOSIS — F33.41 RECURRENT MAJOR DEPRESSIVE DISORDER, IN PARTIAL REMISSION (HCC): ICD-10-CM

## 2018-07-30 DIAGNOSIS — I21.19 INFERIOR MI (HCC): ICD-10-CM

## 2018-07-30 DIAGNOSIS — E03.9 ACQUIRED HYPOTHYROIDISM: ICD-10-CM

## 2018-07-30 DIAGNOSIS — E78.2 MIXED HYPERLIPIDEMIA: Chronic | ICD-10-CM

## 2018-07-30 DIAGNOSIS — E66.3 OVERWEIGHT (BMI 25.0-29.9): Chronic | ICD-10-CM

## 2018-07-30 DIAGNOSIS — I10 ESSENTIAL HYPERTENSION: Primary | ICD-10-CM

## 2018-07-30 PROBLEM — F17.200 SMOKER: Chronic | Status: RESOLVED | Noted: 2018-02-26 | Resolved: 2018-07-30

## 2018-07-30 PROCEDURE — 99214 OFFICE O/P EST MOD 30 MIN: CPT | Performed by: OBSTETRICS & GYNECOLOGY

## 2018-07-30 RX ORDER — PHENTERMINE HYDROCHLORIDE 30 MG/1
30 CAPSULE ORAL EVERY MORNING
Qty: 60 CAPSULE | Refills: 3 | Status: SHIPPED | OUTPATIENT
Start: 2018-07-30 | End: 2018-07-30 | Stop reason: SDUPTHER

## 2018-07-30 RX ORDER — PHENTERMINE HYDROCHLORIDE 30 MG/1
30 CAPSULE ORAL EVERY MORNING
Qty: 60 CAPSULE | Refills: 3 | Status: SHIPPED | OUTPATIENT
Start: 2018-07-30 | End: 2018-09-13

## 2018-07-30 NOTE — PROGRESS NOTES
Amee Almonte is a 58 y.o. y/o female     Chief Complaint: Lethargy, depression, recent myocardial infarction, and difficulty losing weight.  Review lab work     HPI: 58-year-old  with one prior vaginal delivery and hysterectomy with BSO in .       She had a heart attack on Mother's Day, May 13, 2018.     She is not currently on any hormone replacement therapy, and has not been on hormone replacement therapy since around .  She does not have any hot flashes.     She does have some lethargy and weight gain and wonders if it's related to her hormones.     She has hypertension and hypothyroidism.     Current medications include lisinopril/HCTZ 20/12.5 mg and Synthroid 150 µg.  She is .     She is an  at [employer] in [place].     She was smoking a fourth pack cigarettes per day.     She is allergic to iodine which causes swelling and rash.     She has tried Wellbutrin in the past without problems, but she did not feel it gave her the same energy that phentermine did, and she requests to get back on phentermine.  I suggested both Wellbutrin and phentermine.  I have counseled her to quit smoking.     It is been about a year and a half since she's had any lab work done.     Her blood pressures well controlled.  She does not have any chest pain or heart palpitations.  Her bowels are working well.     We discussed checking fasting lab work in starting on Wellbutrin and phentermine x 2, and she is agreeable to this plan.     2018 Pap smear negative for intraepithelial lesion or malignancy and negative for high-risk HPV.     Fasting lab work obtained 2018 showed vitamin D 50.8.  B12 965.  TSH 0.169, T4 9.8, T3 uptake 33, free thyroxine index 3.2.  Total cholesterol 241, triglycerides 208, HDL 57, , LDL/HDL ratio normal at 2.50.  Hemoglobin 15.3 and hematocrit 45.0 and platelets 467,000.  Fasting glucose 88.  Normal CMP.  I reviewed all of these results  with her in detail.     June 13, 2018, she stopped smoking when she had a heart attack.  She is now being followed by a cardiologist.  I am going to see her back in about 6 weeks, and we will check a CBC, CMP, lipid panel, thyroid panel prior to that visit.  She has taken an 81 mg aspirin, vitamin D3, Synthroid 150 µg, lisinopril 2.5 mg, Claritin, Toprol-XL 50 mg, potassium, Crestor, Brilinta 90 mg, B12, Wellbutrin  twice a day, and a multivitamin.    Lab work July 16, 2018 TSH 0.011, free T4 2.16.  Total cholesterol 150, triglycerides 108, HDL 73, LDL 59, LDL/HDL ratio 0.76.  Vitamin D 46.7.  Fasting glucose 85 normal CMP except for mildly elevated AST at 49 and a LT 66.  Normal CBC.  Her creatinine is 0.76.  I reviewed these results with her in detail July 30, 2018.    July 30, 2018, her lipid panel is now perfect.  She has taken all her medications as prescribed.  She continues not smoke.  She does not have any chest pain or heart palpitations.  She has not had any episodes where she felt that.  She wishes to resume phentermine.  We'll see her back in 4 weeks.  Follow-up sooner as needed.    Spent 25 minutes in direct patient care this patient.    Review of Systems   Constitutional: Positive for fatigue. Negative for activity change, appetite change, chills, diaphoresis, fever and unexpected weight change.   Gastrointestinal: Negative for abdominal pain, constipation, diarrhea and nausea.   Genitourinary: Negative for difficulty urinating, dyspareunia, dysuria, pelvic pain, urgency, vaginal bleeding, vaginal discharge and vaginal pain.   Neurological: Negative for headaches.   Psychiatric/Behavioral: Negative for dysphoric mood. The patient is not nervous/anxious.    All other systems reviewed and are negative.     Breast ROS: negative    The following portions of the patient's history were reviewed and updated as appropriate: allergies, current medications, past family history, past medical history, past  social history, past surgical history and problem list.    Allergies   Allergen Reactions   • Iodine Other (See Comments)   • Iodides Swelling and Rash          Current Outpatient Prescriptions:   •  aspirin 81 MG chewable tablet, Chew 1 tablet Daily., Disp: 30 tablet, Rfl: 12  •  buPROPion SR (WELLBUTRIN SR) 150 MG 12 hr tablet, Take 1 tablet by mouth 2 (Two) Times a Day., Disp: 60 tablet, Rfl: 12  •  Cholecalciferol (VITAMIN D3) 5000 units capsule capsule, Take 5,000 Units by mouth Daily., Disp: , Rfl:   •  Dietary Management Product (ZYVIT) tablet, Take 1 tablet by mouth 2 (Two) Times a Day., Disp: , Rfl: 11  •  levothyroxine (SYNTHROID, LEVOTHROID) 150 MCG tablet, TAKE 1 TABLET BY MOUTH EVERY DAY (MUST CALL DOCTOR FOR FURTHER REFILLS), Disp: 30 tablet, Rfl: 12  •  lisinopril (PRINIVIL,ZESTRIL) 2.5 MG tablet, Take 1 tablet by mouth Daily., Disp: 30 tablet, Rfl: 5  •  Loratadine (CLARITIN PO), Take 1 tablet by mouth Daily., Disp: , Rfl:   •  metoprolol succinate XL (TOPROL-XL) 50 MG 24 hr tablet, Take 1 tablet by mouth Daily With Dinner., Disp: 30 tablet, Rfl: 6  •  Omega-3 Fatty Acids (OMEGA-3 FISH OIL PO), Take  by mouth., Disp: , Rfl:   •  Potassium 75 MG tablet, Take  by mouth., Disp: , Rfl:   •  rosuvastatin (CRESTOR) 10 MG tablet, Take 1 tablet by mouth Every Night., Disp: 30 tablet, Rfl: 12  •  ticagrelor (BRILINTA) 90 MG tablet tablet, Take 1 tablet by mouth 2 (Two) Times a Day., Disp: 60 tablet, Rfl: 12  •  vitamin B-12 (CYANOCOBALAMIN) 100 MCG tablet, Take 50 mcg by mouth Daily., Disp: , Rfl:   •  phentermine 30 MG capsule, Take 1 capsule by mouth Every Morning., Disp: 60 capsule, Rfl: 3     The patient has a family history of   Family History   Problem Relation Age of Onset   • Lung cancer Father    • Skin cancer Mother         Past Medical History:   Diagnosis Date   • Acquired hypothyroidism 2/26/2018   • Heart attack    • Hypertension    • Mixed hyperlipidemia 7/30/2018   • Overweight (BMI 25.0-29.9)  "2018   • Recurrent major depressive disorder, in partial remission (CMS/HCC) 2018   • Smoker 2018        OB History      Para Term  AB Living    1 1       1    SAB TAB Ectopic Molar Multiple Live Births                          Social History     Social History   • Marital status:      Spouse name: N/A   • Number of children: N/A   • Years of education: N/A     Occupational History   • Not on file.     Social History Main Topics   • Smoking status: Former Smoker   • Smokeless tobacco: Never Used   • Alcohol use Yes   • Drug use: No   • Sexual activity: Yes     Birth control/ protection: None, Surgical      Comment: Hysterectomy     Other Topics Concern   • Not on file     Social History Narrative   • No narrative on file        Past Surgical History:   Procedure Laterality Date   • CARDIAC CATHETERIZATION N/A 2018    Procedure: Left Heart Cath;  Surgeon: Bro Sotelo MD;  Location: [facility] CATH INVASIVE LOCATION;  Service: Cardiology   • HYSTERECTOMY     • MD RT/LT HEART CATHETERS N/A 2018    Procedure: Percutaneous Coronary Intervention;  Surgeon: Bro Sotelo MD;  Location: [facility] CATH INVASIVE LOCATION;  Service: Cardiology   • TUBAL ABDOMINAL LIGATION          Patient Active Problem List   Diagnosis   • Hypertension   • Acquired hypothyroidism   • Recurrent major depressive disorder, in partial remission (CMS/HCC)   • Inferior MI (CMS/HCC)   • Coronary artery disease involving native coronary artery of native heart without angina pectoris   • Mixed hyperlipidemia   • Overweight (BMI 25.0-29.9)        Documented Vitals    18 0859   BP: 120/70   Weight: 75.8 kg (167 lb)   Height: 165.1 cm (65\")   PainSc: 0-No pain       Physical Exam   Constitutional: She is oriented to person, place, and time. No distress.   Overweight white female weighing 167 pounds with BMI 27.8.  Blood pressure 120/70.  Pulse 72.   HENT:   Head: Normocephalic and " atraumatic.   Eyes: Pupils are equal, round, and reactive to light. Conjunctivae and EOM are normal.   Neck: Normal range of motion. Neck supple. No JVD present. No tracheal deviation present. No thyromegaly present.   Cardiovascular: Normal rate, regular rhythm, normal heart sounds and intact distal pulses.  Exam reveals no gallop and no friction rub.    No murmur heard.  Pulmonary/Chest: Effort normal and breath sounds normal. No stridor. No respiratory distress. She has no wheezes. She has no rales. She exhibits no tenderness.   Abdominal: Soft. Bowel sounds are normal. She exhibits no distension and no mass. There is no tenderness. There is no rebound and no guarding. No hernia.   Musculoskeletal: Normal range of motion. She exhibits no edema, tenderness or deformity.   Lymphadenopathy:     She has no cervical adenopathy.   Neurological: She is alert and oriented to person, place, and time. She has normal reflexes. She displays normal reflexes. No cranial nerve deficit. She exhibits normal muscle tone. Coordination normal.   Skin: Skin is warm and dry. No rash noted. She is not diaphoretic. No erythema. No pallor.   Psychiatric: She has a normal mood and affect. Her behavior is normal. Judgment and thought content normal.   Nursing note and vitals reviewed.       Assessment        Diagnosis Plan   1. Essential hypertension     2. Acquired hypothyroidism     3. Recurrent major depressive disorder, in partial remission (CMS/HCC)     4. Inferior MI (CMS/HCC)     5. Mixed hyperlipidemia     6. Overweight (BMI 25.0-29.9)           Plan      1. Continue Wellbutrin  mg twice daily.  2. Continue antihypertensives and medication for hyperlipidemia.  3. Continue Synthroid.  4. Continue 81 mg aspirin.  5. Phentermine 30 mg capsule each morning.  6. Stay smoke-free.    7. Encouraged in diet and exercise.   8. Follow-up in 6 weeks.  Follow-up sooner as needed.            This document has been electronically signed by  Carlos Max MD on July 30, 2018 9:17 AM

## 2018-07-30 NOTE — PROGRESS NOTES
Amee Almonte is a 58 y.o. y/o female     Chief Complaint: Lethargy, depression, recent myocardial infarction, and difficulty losing weight.     HPI: 58-year-old  with one prior vaginal delivery and hysterectomy with BSO in .      She had a heart attack on Mother's Day, May 13, 2018.    She is not currently on any hormone replacement therapy, and has not been on hormone replacement therapy since around .  She does not have any hot flashes.     She does have some lethargy and weight gain and wonders if it's related to her hormones.     She has hypertension and hypothyroidism.     Current medications include lisinopril/HCTZ 20/12.5 mg and Synthroid 150 µg.  She is .     She is an  at [email or web address] in [place].     She was smoking a fourth pack cigarettes per day.     She is allergic to iodine which causes swelling and rash.     She has tried Wellbutrin in the past without problems, but she did not feel it gave her the same energy that phentermine did, and she requests to get back on phentermine.  I suggested both Wellbutrin and phentermine.  I have counseled her to quit smoking.     It is been about a year and a half since she's had any lab work done.     Her blood pressures well controlled.  She does not have any chest pain or heart palpitations.  Her bowels are working well.     We discussed checking fasting lab work in starting on Wellbutrin and phentermine x 2, and she is agreeable to this plan.     2018 Pap smear negative for intraepithelial lesion or malignancy and negative for high-risk HPV.     Fasting lab work obtained 2018 showed vitamin D 50.8.  B12 965.  TSH 0.169, T4 9.8, T3 uptake 33, free thyroxine index 3.2.  Total cholesterol 241, triglycerides 208, HDL 57, , LDL/HDL ratio normal at 2.50.  Hemoglobin 15.3 and hematocrit 45.0 and platelets 467,000.  Fasting glucose 88.  Normal CMP.  I reviewed all of these results with her in  detail.    June 13, 2018, she stopped smoking when she had a heart attack.  She is now being followed by a cardiologist.  I am going to see her back in about 6 weeks, and we will check a CBC, CMP, lipid panel, thyroid panel prior to that visit.  She has taken an 81 mg aspirin, vitamin D3, Synthroid 150 µg, lisinopril 2.5 mg, Claritin, Toprol-XL 50 mg, potassium, Crestor, Brilinta 90 mg, B12, Wellbutrin  twice a day, and a multivitamin.    Review of Systems   Constitutional: Positive for fatigue. Negative for activity change, appetite change, chills, diaphoresis, fever and unexpected weight change.   Gastrointestinal: Negative for abdominal pain, constipation, diarrhea and nausea.   Genitourinary: Negative for difficulty urinating, dyspareunia, dysuria, pelvic pain, urgency, vaginal bleeding, vaginal discharge and vaginal pain.   Neurological: Negative for headaches.   Psychiatric/Behavioral: Negative for dysphoric mood. The patient is not nervous/anxious.    All other systems reviewed and are negative.     Breast ROS: negative    The following portions of the patient's history were reviewed and updated as appropriate: allergies, current medications, past family history, past medical history, past social history, past surgical history and problem list.    Allergies   Allergen Reactions   • Iodine Other (See Comments)   • Iodides Swelling and Rash          Current Outpatient Prescriptions:   •  aspirin 81 MG chewable tablet, Chew 1 tablet Daily., Disp: 30 tablet, Rfl: 12  •  Cholecalciferol (VITAMIN D3) 5000 units capsule capsule, Take 5,000 Units by mouth Daily., Disp: , Rfl:   •  levothyroxine (SYNTHROID, LEVOTHROID) 150 MCG tablet, TAKE 1 TABLET BY MOUTH EVERY DAY (MUST CALL DOCTOR FOR FURTHER REFILLS), Disp: 30 tablet, Rfl: 12  •  lisinopril (PRINIVIL,ZESTRIL) 2.5 MG tablet, Take 1 tablet by mouth Daily., Disp: 30 tablet, Rfl: 5  •  Loratadine (CLARITIN PO), Take 1 tablet by mouth Daily., Disp: , Rfl:   •   metoprolol succinate XL (TOPROL-XL) 50 MG 24 hr tablet, Take 1 tablet by mouth Daily With Dinner., Disp: 30 tablet, Rfl: 6  •  Potassium 75 MG tablet, Take  by mouth., Disp: , Rfl:   •  rosuvastatin (CRESTOR) 10 MG tablet, Take 1 tablet by mouth Every Night., Disp: 30 tablet, Rfl: 12  •  ticagrelor (BRILINTA) 90 MG tablet tablet, Take 1 tablet by mouth 2 (Two) Times a Day., Disp: 60 tablet, Rfl: 12  •  vitamin B-12 (CYANOCOBALAMIN) 100 MCG tablet, Take 50 mcg by mouth Daily., Disp: , Rfl:   •  buPROPion SR (WELLBUTRIN SR) 150 MG 12 hr tablet, Take 1 tablet by mouth 2 (Two) Times a Day., Disp: 60 tablet, Rfl: 12  •  Dietary Management Product (ZYVIT) tablet, Take 1 tablet by mouth 2 (Two) Times a Day., Disp: , Rfl: 11     The patient has a family history of   Family History   Problem Relation Age of Onset   • Lung cancer Father    • Skin cancer Mother         Past Medical History:   Diagnosis Date   • Acquired hypothyroidism 2018   • Heart attack    • Hypertension    • Recurrent major depressive disorder, in partial remission (CMS/HCC) 2018   • Smoker 2018        OB History      Para Term  AB Living    1 1       1    SAB TAB Ectopic Molar Multiple Live Births                          Social History     Social History   • Marital status:      Spouse name: N/A   • Number of children: N/A   • Years of education: N/A     Occupational History   • Not on file.     Social History Main Topics   • Smoking status: Former Smoker   • Smokeless tobacco: Never Used   • Alcohol use Yes   • Drug use: No   • Sexual activity: Yes     Birth control/ protection: None, Surgical      Comment: Hysterectomy     Other Topics Concern   • Not on file     Social History Narrative   • No narrative on file        Past Surgical History:   Procedure Laterality Date   • CARDIAC CATHETERIZATION N/A 2018    Procedure: Left Heart Cath;  Surgeon: Bro Sotelo MD;  Location: [facility] INVASIVE LOCATION;   "Service: Cardiology   • HYSTERECTOMY  2003   • [place] RT/LT HEART CATHETERS N/A 5/13/2018    Procedure: Percutaneous Coronary Intervention;  Surgeon: Bro Sotelo MD;  Location: [facility] INVASIVE LOCATION;  Service: Cardiology   • TUBAL ABDOMINAL LIGATION  1999        Patient Active Problem List   Diagnosis   • Hypertension   • Acquired hypothyroidism   • Recurrent major depressive disorder, in partial remission (CMS/HCC)   • Smoker   • Inferior MI (CMS/HCC)   • Coronary artery disease involving native coronary artery of native heart without angina pectoris        Documented Vitals    06/13/18 1543   BP: 139/86   Weight: 74.8 kg (165 lb)   Height: 165.1 cm (65\")   PainSc: 0-No pain       Physical Exam   Constitutional: She is oriented to person, place, and time. No distress.   Overweight white female weighing 165 pounds with BMI 27.5.  Blood pressure 139/86.  Pulse 80.   HENT:   Head: Normocephalic and atraumatic.   Eyes: Pupils are equal, round, and reactive to light. Conjunctivae and EOM are normal.   Neck: Normal range of motion. Neck supple. No JVD present. No tracheal deviation present. No thyromegaly present.   Cardiovascular: Normal rate, regular rhythm, normal heart sounds and intact distal pulses.  Exam reveals no gallop and no friction rub.    No murmur heard.  Pulmonary/Chest: Effort normal and breath sounds normal. No stridor. No respiratory distress. She has no wheezes. She has no rales. She exhibits no tenderness.   Abdominal: Soft. Bowel sounds are normal. She exhibits no distension and no mass. There is no tenderness. There is no rebound and no guarding. No hernia.   Musculoskeletal: Normal range of motion. She exhibits no edema, tenderness or deformity.   Lymphadenopathy:     She has no cervical adenopathy.   Neurological: She is alert and oriented to person, place, and time. She has normal reflexes. She displays normal reflexes. No cranial nerve deficit. She exhibits normal muscle tone. " Coordination normal.   Skin: Skin is warm and dry. No rash noted. She is not diaphoretic. No erythema. No pallor.   Psychiatric: She has a normal mood and affect. Her behavior is normal. Judgment and thought content normal.   Nursing note and vitals reviewed.       Assessment        Diagnosis Plan   1. Essential hypertension  CBC & Differential    Comprehensive Metabolic Panel    Vitamin D 25 Hydroxy    Lipid Panel    TSH Rfx On Abnormal To Free T4   2. Acquired hypothyroidism  CBC & Differential    Comprehensive Metabolic Panel    Vitamin D 25 Hydroxy    Lipid Panel    TSH Rfx On Abnormal To Free T4   3. Recurrent major depressive disorder, in partial remission (CMS/[facility])  CBC & Differential    Comprehensive Metabolic Panel    Vitamin D 25 Hydroxy    Lipid Panel    TSH Rfx On Abnormal To Free T4   4. Inferior MI (CMS/[facility])  CBC & Differential    Comprehensive Metabolic Panel    Vitamin D 25 Hydroxy    Lipid Panel    TSH Rfx On Abnormal To Free T4   5. Smoker  CBC & Differential    Comprehensive Metabolic Panel    Vitamin D 25 Hydroxy    Lipid Panel    TSH Rfx On Abnormal To Free T4         Plan      1. Continue Wellbutrin  mg twice daily.  2. Continue antihypertensives and medication for hyperlipidemia.  3. Continue Synthroid.  4. Continue 81 mg aspirin.  5. Stay smoke-free.    6. Encouraged in diet and exercise.   7. Follow-up in 6 weeks.  Follow-up sooner as needed.            This document has been electronically signed by Carlos Max MD on July 30, 2018 7:25 AM

## 2018-09-13 ENCOUNTER — OFFICE VISIT (OUTPATIENT)
Dept: CARDIOLOGY | Facility: CLINIC | Age: 58
End: 2018-09-13

## 2018-09-13 VITALS
HEIGHT: 65 IN | DIASTOLIC BLOOD PRESSURE: 88 MMHG | HEART RATE: 89 BPM | OXYGEN SATURATION: 99 % | BODY MASS INDEX: 27.82 KG/M2 | SYSTOLIC BLOOD PRESSURE: 138 MMHG | WEIGHT: 167 LBS

## 2018-09-13 DIAGNOSIS — E78.2 MIXED HYPERLIPIDEMIA: Chronic | ICD-10-CM

## 2018-09-13 DIAGNOSIS — I25.10 CORONARY ARTERY DISEASE INVOLVING NATIVE CORONARY ARTERY OF NATIVE HEART WITHOUT ANGINA PECTORIS: Primary | ICD-10-CM

## 2018-09-13 DIAGNOSIS — I10 ESSENTIAL HYPERTENSION: Chronic | ICD-10-CM

## 2018-09-13 PROCEDURE — 99214 OFFICE O/P EST MOD 30 MIN: CPT | Performed by: INTERNAL MEDICINE

## 2018-09-13 RX ORDER — LISINOPRIL 5 MG/1
5 TABLET ORAL
Qty: 90 TABLET | Refills: 4 | Status: SHIPPED | OUTPATIENT
Start: 2018-09-13 | End: 2018-10-01 | Stop reason: DRUGHIGH

## 2018-09-13 NOTE — PROGRESS NOTES
[facility] Cardiology  OFFICE NOTE    Amee Almonte  58 y.o. female    09/13/2018  1. Coronary artery disease involving native coronary artery of native heart without angina pectoris    2. Mixed hyperlipidemia    3. Essential hypertension        Chief complaint -Follow-up  post inferior wall MI      History of present Illness- 58-year-old lady who works at the [employer] [place] had acute inferior wall MI had stent placement to mid right coronary artery, doing well , exercising 30-40 minutes daily.  Her blood pressure is still mildly elevated I increased the dose of lisinopril to 5 mg daily and continue the Toprol-XL 50 mg daily as she is little tachycardic.  Her TSH is suppressed and her free T4 is elevated I asked her to discuss that with her family doctor to decrease the dose of Synthroid.  She is doing well with her Crestor and her lipids are excellent.  She is on Wellbutrin for her depression and she has quit smoking since her heart attack.              Allergies   Allergen Reactions   • Iodine Other (See Comments)   • Iodides Swelling and Rash         Past Medical History:   Diagnosis Date   • Acquired hypothyroidism 2/26/2018   • Heart attack    • Hypertension    • Mixed hyperlipidemia 7/30/2018   • Overweight (BMI 25.0-29.9) 7/30/2018   • Recurrent major depressive disorder, in partial remission (CMS/HCC) 2/26/2018   • Smoker 2/26/2018         Past Surgical History:   Procedure Laterality Date   • CARDIAC CATHETERIZATION N/A 5/13/2018    Procedure: Left Heart Cath;  Surgeon: Bro Sotelo MD;  Location: [facility] INVASIVE LOCATION;  Service: Cardiology   • HYSTERECTOMY  2003   • [place] RT/LT HEART CATHETERS N/A 5/13/2018    Procedure: Percutaneous Coronary Intervention;  Surgeon: Bro Sotelo MD;  Location: [facility] CATH INVASIVE LOCATION;  Service: Cardiology   • TUBAL ABDOMINAL LIGATION  1999         Family History   Problem Relation Age of Onset   • Lung cancer Father    •  Skin cancer Mother          Social History     Social History   • Marital status:      Spouse name: N/A   • Number of children: N/A   • Years of education: N/A     Occupational History   • Not on file.     Social History Main Topics   • Smoking status: Former Smoker   • Smokeless tobacco: Never Used   • Alcohol use Yes   • Drug use: No   • Sexual activity: Yes     Birth control/ protection: None, Surgical      Comment: Hysterectomy     Other Topics Concern   • Not on file     Social History Narrative   • No narrative on file         Current Outpatient Prescriptions   Medication Sig Dispense Refill   • aspirin 81 MG chewable tablet Chew 1 tablet Daily. 30 tablet 12   • buPROPion SR (WELLBUTRIN SR) 150 MG 12 hr tablet Take 1 tablet by mouth 2 (Two) Times a Day. 60 tablet 12   • Cholecalciferol (VITAMIN D3) 5000 units capsule capsule Take 5,000 Units by mouth Daily.     • Dietary Management Product (ZYVIT) tablet Take 1 tablet by mouth 2 (Two) Times a Day.  11   • levothyroxine (SYNTHROID, LEVOTHROID) 150 MCG tablet TAKE 1 TABLET BY MOUTH EVERY DAY (MUST CALL DOCTOR FOR FURTHER REFILLS) 30 tablet 12   • lisinopril (PRINIVIL,ZESTRIL) 5 MG tablet Take 1 tablet by mouth Daily. 90 tablet 4   • Loratadine (CLARITIN PO) Take 1 tablet by mouth Daily.     • metoprolol succinate XL (TOPROL-XL) 50 MG 24 hr tablet Take 1 tablet by mouth Daily With Dinner. 30 tablet 6   • Omega-3 Fatty Acids (OMEGA-3 FISH OIL PO) Take  by mouth.     • Potassium 75 MG tablet Take  by mouth.     • rosuvastatin (CRESTOR) 10 MG tablet Take 1 tablet by mouth Every Night. 30 tablet 12   • ticagrelor (BRILINTA) 90 MG tablet tablet Take 1 tablet by mouth 2 (Two) Times a Day. 60 tablet 12   • vitamin B-12 (CYANOCOBALAMIN) 100 MCG tablet Take 50 mcg by mouth Daily.       No current facility-administered medications for this visit.          Review of Systems     Constitution: Denies any fatigue, fever or chills    HENT: Denies any headache, hearing  "impairment,     Eyes: Denies any blurring of vision, or photophobia     Cardivascular - As per history of present illness     Respiratory system-denies any COPD, shortness of breath,   sleep apnea.     Endocrine:   history of hyperlipidemia                      Hypothyrodism       Musculoskeletal:  No history of arthritis with musculoskeletal problems    Gastrointestinal: No nausea, vomiting, or melena    Genitourinary: No dysuria or hematuria    Neurological:   No history of seizure disorder, stroke, memory problems    Psychiatric/Behavioral:        No history of depression    Hematological- no history of easy bruising or any bleeding diathesis            OBJECTIVE    /88   Pulse 89   Ht 165.1 cm (65\")   Wt 75.8 kg (167 lb)   SpO2 99%   BMI 27.79 kg/m²       Physical Exam     Constitutional: is oriented to person, place, and time.     Skin-warm and dry    Well developed and nourished in no acute distress      Head: Normocephalic and atraumatic.     Eyes: Pupils are equal    Neck: Neck supple. No bruit in the carotids    Cardiovascular: [place] in the fifth intercostal space   Regular rate, and  Rhythm,    S1 greater than S2, no S3 or S4, no gallop     Pulmonary/Chest:   Air  Entry is equal on both sides  No wheezing or crackles,      Abdominal: Soft.  No hepatosplenomegaly, bowel sounds are present    Musculoskeletal: No kyphoscoliosis, no significant thickening of the joints    Neurological: is alert and oriented to person, place, and time.    cranial nerve are intact .   No motor or sensory deficit    Extremities-no edema, no radial femoral delay      Psychiatric: He has a normal mood and affect.                  His behavior is normal.           Procedures      Lab Results   Component Value Date    WBC 8.74 07/16/2018    HGB 13.5 07/16/2018    HCT 41.2 07/16/2018    MCV 97.2 07/16/2018     07/16/2018     Lab Results   Component Value Date    GLUCOSE 85 07/16/2018    BUN 13 07/16/2018    CREATININE " 0.76 07/16/2018    EGFRIFNONA 79 07/16/2018    BCR 17.1 07/16/2018    CO2 26.0 07/16/2018    CALCIUM 9.8 07/16/2018    ALBUMIN 4.40 07/16/2018    AST 49 (H) 07/16/2018    ALT 66 (H) 07/16/2018     Lab Results   Component Value Date    CHOL 150 07/16/2018    CHOL 223 (H) 05/13/2018    CHOL 241 (H) 04/24/2018     Lab Results   Component Value Date    TRIG 108 07/16/2018    TRIG 66 05/13/2018    TRIG 208 (H) 04/24/2018     Lab Results   Component Value Date    HDL 73 07/16/2018    HDL 58 (L) 05/13/2018    HDL 57 (L) 04/24/2018     No components found for: LDLCALC  Lab Results   Component Value Date    LDL 59 07/16/2018     (H) 05/13/2018     (H) 04/24/2018     No results found for: HDLLDLRATIO  No components found for: CHOLHDL  No results found for: HGBA1C  Lab Results   Component Value Date    TSH 0.011 (L) 07/16/2018    U0KPGDI 9.8 04/24/2018                  A/P  Status post inferior wall MI with stent placement to mid right coronary artery  in May 2018 on dual antiplatelet therapy with aspirin and Brilinta.  Doing well no chest pain or shortness of breath.    Hypertension increase the dose of lisinopril  To 5 mg and continue Toprol to 50 mg she gets tachycardia occasionally.    Hyper lipidemia on Crestor 10 mg daily, her lipids was good in July 2018    Hypothyroidism on Synthroid supplements followed by Dr. Meléndez.  Her free T4 and TSH is abnormal I asked her to discuss with Dr. meléndez    Follow-up in 6 months.               This document has been electronically signed by Bro Sotelo MD on September 13, 2018 8:59 AM       EMR Dragon/Transcription disclaimer:   Some of this note may be an electronic transcription/translation of spoken language to printed text. The electronic translation of spoken language may permit erroneous, or at times, nonsensical words or phrases to be inadvertently transcribed; Although I have reviewed the note for such errors, some may still exist.

## 2018-10-01 ENCOUNTER — OFFICE VISIT (OUTPATIENT)
Dept: OBSTETRICS AND GYNECOLOGY | Facility: CLINIC | Age: 58
End: 2018-10-01

## 2018-10-01 VITALS
BODY MASS INDEX: 27.66 KG/M2 | DIASTOLIC BLOOD PRESSURE: 59 MMHG | SYSTOLIC BLOOD PRESSURE: 122 MMHG | WEIGHT: 166 LBS | HEIGHT: 65 IN

## 2018-10-01 DIAGNOSIS — E78.2 MIXED HYPERLIPIDEMIA: ICD-10-CM

## 2018-10-01 DIAGNOSIS — E03.9 ACQUIRED HYPOTHYROIDISM: ICD-10-CM

## 2018-10-01 DIAGNOSIS — I10 ESSENTIAL HYPERTENSION: Primary | ICD-10-CM

## 2018-10-01 DIAGNOSIS — I21.19 INFERIOR MI (HCC): ICD-10-CM

## 2018-10-01 DIAGNOSIS — E66.3 OVERWEIGHT (BMI 25.0-29.9): ICD-10-CM

## 2018-10-01 DIAGNOSIS — F33.41 RECURRENT MAJOR DEPRESSIVE DISORDER, IN PARTIAL REMISSION (HCC): ICD-10-CM

## 2018-10-01 PROCEDURE — 99213 OFFICE O/P EST LOW 20 MIN: CPT | Performed by: OBSTETRICS & GYNECOLOGY

## 2018-10-01 RX ORDER — PHENTERMINE HYDROCHLORIDE 30 MG/1
30 CAPSULE ORAL EVERY MORNING
Qty: 60 CAPSULE | Refills: 3 | Status: SHIPPED | OUTPATIENT
Start: 2018-10-01 | End: 2019-10-14

## 2018-10-01 RX ORDER — LISINOPRIL 10 MG/1
10 TABLET ORAL DAILY
COMMUNITY
End: 2018-11-30 | Stop reason: SDUPTHER

## 2018-10-01 RX ORDER — PHENTERMINE HYDROCHLORIDE 37.5 MG/1
37.5 TABLET ORAL
Qty: 60 TABLET | Refills: 3 | Status: SHIPPED | OUTPATIENT
Start: 2018-10-01 | End: 2020-01-22

## 2018-10-01 NOTE — PROGRESS NOTES
Amee Almonte is a 58 y.o. y/o female     Chief Complaint: Lethargy, depression, recent myocardial infarction, and difficulty losing weight.     HPI: 58-year-old  with one prior vaginal delivery and hysterectomy with BSO in .       She had a heart attack on Mother's Day, May 13, 2018.     She is not currently on any hormone replacement therapy, and has not been on hormone replacement therapy since around .  She does not have any hot flashes.     She does have some lethargy and weight gain and wonders if it's related to her hormones.     She has hypertension and hypothyroidism.     Current medications include lisinopril/HCTZ 20/12.5 mg and Synthroid 150 µg.  She is .     She is an  at [employer] in [place].     She was smoking a fourth pack cigarettes per day.     She is allergic to iodine which causes swelling and rash.     She has tried Wellbutrin in the past without problems, but she did not feel it gave her the same energy that phentermine did, and she requests to get back on phentermine.  I suggested both Wellbutrin and phentermine.  I have counseled her to quit smoking.     It is been about a year and a half since she's had any lab work done.     Her blood pressures well controlled.  She does not have any chest pain or heart palpitations.  Her bowels are working well.     We discussed checking fasting lab work in starting on Wellbutrin and phentermine x 2, and she is agreeable to this plan.     2018 Pap smear negative for intraepithelial lesion or malignancy and negative for high-risk HPV.     Fasting lab work obtained 2018 showed vitamin D 50.8.  B12 965.  TSH 0.169, T4 9.8, T3 uptake 33, free thyroxine index 3.2.  Total cholesterol 241, triglycerides 208, HDL 57, , LDL/HDL ratio normal at 2.50.  Hemoglobin 15.3 and hematocrit 45.0 and platelets 467,000.  Fasting glucose 88.  Normal CMP.  I reviewed all of these results with her in  detail.     June 13, 2018, she stopped smoking when she had a heart attack.  She is now being followed by a cardiologist.  I am going to see her back in about 6 weeks, and we will check a CBC, CMP, lipid panel, thyroid panel prior to that visit.  She has taken an 81 mg aspirin, vitamin D3, Synthroid 150 µg, lisinopril 2.5 mg, Claritin, Toprol-XL 50 mg, potassium, Crestor, Brilinta 90 mg, B12, Wellbutrin  twice a day, and a multivitamin.    Lab work July 16, 2018 TSH 0.011, free T4 2.16.  Total cholesterol 150, triglycerides 108, HDL 73, LDL 59, LDL/HDL ratio 0.76.  Vitamin D 46.7.  Fasting glucose 85 normal CMP except for mildly elevated AST at 49 and a LT 66.  Normal CBC.  Her creatinine is 0.76.  I reviewed these results with her in detail July 30, 2018.    July 30, 2018, her lipid panel is now perfect.  She has taken all her medications as prescribed.  She continues not smoke.  She does not have any chest pain or heart palpitations.  She has not had any episodes where she felt that.  She wishes to resume phentermine.  We'll see her back in 4 weeks.  Follow-up sooner as needed.    October 1, 2018, she does not have any problems with the phentermine capsule.  She does not have any chest pain or heart palpitations.  After a stressful week at work, her blood pressure was slightly elevated, and her cardiologist increased her lisinopril from 5 mg to 10 mg.  She did not actually increase her medication until 5 days ago, and since increasing it, she has felt very lethargic, and today her blood pressure is 122/59.  She lost 1 pounds.  I have suggested stick with it for a few more days and try adding some phentermine in the middle of the day.  She is to follow-up in 4 weeks.  Follow-up sooner as needed.        Review of Systems   Constitutional: Positive for fatigue. Negative for activity change, appetite change, chills, diaphoresis, fever and unexpected weight change.   Gastrointestinal: Negative for abdominal pain,  constipation, diarrhea and nausea.   Genitourinary: Negative for difficulty urinating, dyspareunia, dysuria, pelvic pain, urgency, vaginal bleeding, vaginal discharge and vaginal pain.   Neurological: Negative for headaches.   Psychiatric/Behavioral: Negative for dysphoric mood. The patient is not nervous/anxious.    All other systems reviewed and are negative.     Breast ROS: negative    The following portions of the patient's history were reviewed and updated as appropriate: allergies, current medications, past family history, past medical history, past social history, past surgical history and problem list.    Allergies   Allergen Reactions   • Iodine Other (See Comments)   • Iodides Swelling and Rash          Current Outpatient Prescriptions:   •  aspirin 81 MG chewable tablet, Chew 1 tablet Daily., Disp: 30 tablet, Rfl: 12  •  buPROPion SR (WELLBUTRIN SR) 150 MG 12 hr tablet, Take 1 tablet by mouth 2 (Two) Times a Day., Disp: 60 tablet, Rfl: 12  •  Cholecalciferol (VITAMIN D3) 5000 units capsule capsule, Take 5,000 Units by mouth Daily., Disp: , Rfl:   •  Dietary Management Product (ZYVIT) tablet, Take 1 tablet by mouth 2 (Two) Times a Day., Disp: , Rfl: 11  •  levothyroxine (SYNTHROID, LEVOTHROID) 150 MCG tablet, TAKE 1 TABLET BY MOUTH EVERY DAY (MUST CALL DOCTOR FOR FURTHER REFILLS), Disp: 30 tablet, Rfl: 12  •  lisinopril (PRINIVIL,ZESTRIL) 10 MG tablet, Take 10 mg by mouth Daily., Disp: , Rfl:   •  Loratadine (CLARITIN PO), Take 1 tablet by mouth Daily., Disp: , Rfl:   •  metoprolol succinate XL (TOPROL-XL) 50 MG 24 hr tablet, Take 1 tablet by mouth Daily With Dinner., Disp: 30 tablet, Rfl: 6  •  Omega-3 Fatty Acids (OMEGA-3 FISH OIL PO), Take  by mouth., Disp: , Rfl:   •  Potassium 75 MG tablet, Take  by mouth., Disp: , Rfl:   •  rosuvastatin (CRESTOR) 10 MG tablet, Take 1 tablet by mouth Every Night., Disp: 30 tablet, Rfl: 12  •  ticagrelor (BRILINTA) 90 MG tablet tablet, Take 1 tablet by mouth 2 (Two)  Times a Day., Disp: 60 tablet, Rfl: 12  •  vitamin B-12 (CYANOCOBALAMIN) 100 MCG tablet, Take 50 mcg by mouth Daily., Disp: , Rfl:   •  phentermine (ADIPEX-P) 37.5 MG tablet, Take 1 tablet by mouth Daily With Lunch., Disp: 60 tablet, Rfl: 3  •  phentermine 30 MG capsule, Take 1 capsule by mouth Every Morning., Disp: 60 capsule, Rfl: 3     The patient has a family history of   Family History   Problem Relation Age of Onset   • Lung cancer Father    • Skin cancer Mother         Past Medical History:   Diagnosis Date   • Acquired hypothyroidism 2018   • Heart attack (CMS/HCC)    • Hypertension    • Mixed hyperlipidemia 2018   • Overweight (BMI 25.0-29.9) 2018   • Recurrent major depressive disorder, in partial remission (CMS/HCC) 2018   • Smoker 2018        OB History      Para Term  AB Living    1 1       1    SAB TAB Ectopic Molar Multiple Live Births                          Social History     Social History   • Marital status:      Spouse name: N/A   • Number of children: N/A   • Years of education: N/A     Occupational History   • Not on file.     Social History Main Topics   • Smoking status: Former Smoker   • Smokeless tobacco: Never Used   • Alcohol use Yes   • Drug use: No   • Sexual activity: Yes     Birth control/ protection: None, Surgical      Comment: Hysterectomy     Other Topics Concern   • Not on file     Social History Narrative   • No narrative on file        Past Surgical History:   Procedure Laterality Date   • CARDIAC CATHETERIZATION N/A 2018    Procedure: Left Heart Cath;  Surgeon: Bro oStelo MD;  Location: [facility] INVASIVE LOCATION;  Service: Cardiology   • HYSTERECTOMY     • [place] RT/LT HEART CATHETERS N/A 2018    Procedure: Percutaneous Coronary Intervention;  Surgeon: Bro Sotelo MD;  Location: [facility] CATH INVASIVE LOCATION;  Service: Cardiology   • TUBAL ABDOMINAL LIGATION          Patient Active Problem List  "  Diagnosis   • Hypertension   • Acquired hypothyroidism   • Recurrent major depressive disorder, in partial remission (CMS/HCC)   • Inferior MI (CMS/HCC)   • Coronary artery disease involving native coronary artery of native heart without angina pectoris   • Mixed hyperlipidemia   • Overweight (BMI 25.0-29.9)        Documented Vitals    10/01/18 0855   BP: 122/59   Weight: 75.3 kg (166 lb)   Height: 165.1 cm (65\")   PainSc: 0-No pain       Physical Exam   Constitutional: She is oriented to person, place, and time. No distress.   Overweight white female weighing 166 pounds with BMI 27.6  Blood pressure 122/59.  Pulse 74.   HENT:   Head: Normocephalic and atraumatic.   Eyes: Pupils are equal, round, and reactive to light. Conjunctivae and EOM are normal.   Neck: Normal range of motion. Neck supple. No JVD present. No tracheal deviation present. No thyromegaly present.   Cardiovascular: Normal rate, regular rhythm, normal heart sounds and intact distal pulses.  Exam reveals no gallop and no friction rub.    No murmur heard.  Pulmonary/Chest: Effort normal and breath sounds normal. No stridor. No respiratory distress. She has no wheezes. She has no rales. She exhibits no tenderness.   Abdominal: Soft. Bowel sounds are normal. She exhibits no distension and no mass. There is no tenderness. There is no rebound and no guarding. No hernia.   Musculoskeletal: Normal range of motion. She exhibits no edema, tenderness or deformity.   Lymphadenopathy:     She has no cervical adenopathy.   Neurological: She is alert and oriented to person, place, and time. She has normal reflexes. She displays normal reflexes. No cranial nerve deficit. She exhibits normal muscle tone. Coordination normal.   Skin: Skin is warm and dry. No rash noted. She is not diaphoretic. No erythema. No pallor.   Psychiatric: She has a normal mood and affect. Her behavior is normal. Judgment and thought content normal.   Nursing note and vitals reviewed.   "     Assessment        Diagnosis Plan   1. Essential hypertension     2. Acquired hypothyroidism     3. Recurrent major depressive disorder, in partial remission (CMS/HCC)     4. Inferior MI (CMS/HCC)     5. Mixed hyperlipidemia     6. Overweight (BMI 25.0-29.9)           Plan      1. Continue Wellbutrin  mg twice daily.  2. Continue antihypertensives and medication for hyperlipidemia.  3. Continue Synthroid.  4. Continue 81 mg aspirin.  5. Continue Phentermine 30 mg capsule each morning.  6. Add phentermine 37.5 mg tablet at lunch time starting with one half tablet and titrating up to 1 tablet  7. Stay smoke-free.    8. Encouraged in diet and exercise.   9. Follow-up in 4 weeks.  Follow-up sooner as needed.            This document has been electronically signed by Carlos Max MD on October 1, 2018 9:09 AM

## 2018-10-03 RX ORDER — PHENTERMINE HYDROCHLORIDE 30 MG/1
30 CAPSULE ORAL EVERY MORNING
Qty: 60 CAPSULE | Refills: 3 | OUTPATIENT
Start: 2018-10-03

## 2018-11-29 ENCOUNTER — OFFICE VISIT (OUTPATIENT)
Dept: FAMILY MEDICINE CLINIC | Facility: CLINIC | Age: 58
End: 2018-11-29

## 2018-11-29 VITALS
OXYGEN SATURATION: 98 % | TEMPERATURE: 98.1 F | DIASTOLIC BLOOD PRESSURE: 72 MMHG | BODY MASS INDEX: 27.66 KG/M2 | HEART RATE: 100 BPM | HEIGHT: 65 IN | SYSTOLIC BLOOD PRESSURE: 136 MMHG | RESPIRATION RATE: 20 BRPM | WEIGHT: 166 LBS

## 2018-11-29 DIAGNOSIS — E78.2 MIXED HYPERLIPIDEMIA: Chronic | ICD-10-CM

## 2018-11-29 DIAGNOSIS — I10 ESSENTIAL HYPERTENSION: Chronic | ICD-10-CM

## 2018-11-29 DIAGNOSIS — I25.10 CORONARY ARTERY DISEASE INVOLVING NATIVE CORONARY ARTERY OF NATIVE HEART WITHOUT ANGINA PECTORIS: ICD-10-CM

## 2018-11-29 DIAGNOSIS — J01.00 ACUTE MAXILLARY SINUSITIS, RECURRENCE NOT SPECIFIED: Primary | ICD-10-CM

## 2018-11-29 DIAGNOSIS — E03.9 ACQUIRED HYPOTHYROIDISM: Chronic | ICD-10-CM

## 2018-11-29 PROCEDURE — 99214 OFFICE O/P EST MOD 30 MIN: CPT | Performed by: NURSE PRACTITIONER

## 2018-11-29 RX ORDER — LISINOPRIL 10 MG/1
10 TABLET ORAL DAILY
Qty: 30 TABLET | Refills: 5 | Status: SHIPPED | OUTPATIENT
Start: 2018-11-29 | End: 2019-06-17 | Stop reason: SDUPTHER

## 2018-11-29 RX ORDER — CEFUROXIME AXETIL 500 MG/1
500 TABLET ORAL 2 TIMES DAILY
Qty: 20 TABLET | Refills: 0 | Status: SHIPPED | OUTPATIENT
Start: 2018-11-29 | End: 2019-03-19

## 2018-11-29 RX ORDER — METOPROLOL SUCCINATE 50 MG/1
50 TABLET, EXTENDED RELEASE ORAL
Qty: 30 TABLET | Refills: 6 | Status: SHIPPED | OUTPATIENT
Start: 2018-11-29 | End: 2020-01-22

## 2018-11-30 NOTE — PROGRESS NOTES
Subjective   Amee Almonte is a 58 y.o. female.     Here today to establish.  She reports she had a mi in may of this year.  Was treated with stints at [facility] and cont to see dr gunter.  She says she is doing well.  She also has a hx of hypothyroidism that is controlled.  She has had sinus congestion for about 3 weeks.  [facility] and [facility], but has not gotten over it.  Cont to have some pressure and d/c nasal drainage.      Hypertension   This is a chronic problem. The current episode started more than 1 year ago. The problem is controlled. Pertinent negatives include no anxiety, blurred vision, chest pain, headaches, malaise/fatigue, neck pain, orthopnea, palpitations, peripheral edema, PND, shortness of breath or sweats. Agents associated with hypertension include amphetamines and thyroid hormones. Risk factors for coronary artery disease include family history, dyslipidemia, post-menopausal state and sedentary lifestyle. Past treatments include ACE inhibitors and beta blockers. Current antihypertension treatment includes ACE inhibitors and beta blockers. The current treatment provides significant improvement. There are no compliance problems.  Hypertensive end-organ damage includes CAD/MI. There is no history of angina, kidney disease, CVA, heart failure, PVD or retinopathy. There is no history of chronic renal disease.   Hyperlipidemia   This is a chronic problem. The current episode started more than 1 month ago. The problem is controlled. Recent lipid tests were reviewed and are normal. She has no history of chronic renal disease, diabetes, hypothyroidism, liver disease, obesity or nephrotic syndrome. Factors aggravating her hyperlipidemia include beta blockers. Pertinent negatives include no chest pain, focal sensory loss, focal weakness, leg pain, myalgias or shortness of breath. Current antihyperlipidemic treatment includes statins. The current treatment provides significant  improvement of lipids. There are no compliance problems.  Risk factors for coronary artery disease include family history, dyslipidemia, hypertension and post-menopausal.   Sinusitis   This is a new problem. The current episode started 1 to 4 weeks ago. The problem is unchanged. There has been no fever. Her pain is at a severity of 3/10. The pain is mild. Associated symptoms include congestion, coughing, diaphoresis, ear pain and sinus pressure. Pertinent negatives include no chills, headaches, hoarse voice, neck pain, shortness of breath, sneezing, sore throat or swollen glands. Past treatments include saline sprays. The treatment provided moderate relief.        The following portions of the patient's history were reviewed and updated as appropriate: allergies, current medications, past family history, past medical history, past social history, past surgical history and problem list.    Review of Systems   Constitutional: Positive for diaphoresis. Negative for chills and malaise/fatigue.   HENT: Positive for congestion, ear pain and sinus pressure. Negative for hoarse voice, sneezing and sore throat.    Eyes: Negative for blurred vision.   Respiratory: Positive for cough. Negative for shortness of breath.    Cardiovascular: Negative.  Negative for chest pain, palpitations, orthopnea and PND.   Gastrointestinal: Negative.    Musculoskeletal: Negative.  Negative for myalgias and neck pain.   Skin: Negative.    Neurological: Negative.  Negative for focal weakness.   Psychiatric/Behavioral: Negative.        Objective   Physical Exam   Constitutional: She is oriented to person, place, and time. She appears well-developed and well-nourished.   HENT:   Head: Normocephalic.   Right Ear: Hearing, tympanic membrane, external ear and ear canal normal.   Left Ear: Hearing, tympanic membrane, external ear and ear canal normal.   Nose: Right sinus exhibits maxillary sinus tenderness. Right sinus exhibits no frontal sinus  tenderness. Left sinus exhibits maxillary sinus tenderness. Left sinus exhibits no frontal sinus tenderness.   Mouth/Throat: Oropharynx is clear and moist. No oropharyngeal exudate.   Eyes: Pupils are equal, round, and reactive to light.   Neck: Normal range of motion.   Cardiovascular: Normal rate, regular rhythm and normal heart sounds. Exam reveals no friction rub.   No murmur heard.  Pulmonary/Chest: Effort normal and breath sounds normal. No stridor. No respiratory distress. She has no wheezes. She has no rales.   Abdominal: Soft.   Musculoskeletal: Normal range of motion.   Neurological: She is alert and oriented to person, place, and time.   Skin: Skin is warm and dry.   Psychiatric: She has a normal mood and affect. Thought content normal.   Nursing note and vitals reviewed.        Assessment/Plan   Amee was seen today for nasal congestion, cough, hypothyroidism and hypertension.    Diagnoses and all orders for this visit:    Acute maxillary sinusitis, recurrence not specified  -     cefuroxime (CEFTIN) 500 MG tablet; Take 1 tablet by mouth 2 (Two) Times a Day.    Essential hypertension  -     lisinopril (PRINIVIL,ZESTRIL) 10 MG tablet; Take 1 tablet by mouth Daily.  -     metoprolol succinate XL (TOPROL-XL) 50 MG 24 hr tablet; Take 1 tablet by mouth Daily With Dinner.    Coronary artery disease involving native coronary artery of native heart without angina pectoris    Mixed hyperlipidemia    Acquired hypothyroidism

## 2018-12-27 RX ORDER — LEVOTHYROXINE SODIUM 0.15 MG/1
TABLET ORAL
Qty: 30 TABLET | Refills: 12 | Status: SHIPPED | OUTPATIENT
Start: 2018-12-27 | End: 2019-03-14 | Stop reason: DRUGHIGH

## 2019-03-01 ENCOUNTER — OFFICE VISIT (OUTPATIENT)
Dept: FAMILY MEDICINE CLINIC | Facility: CLINIC | Age: 59
End: 2019-03-01

## 2019-03-01 VITALS
HEIGHT: 65 IN | DIASTOLIC BLOOD PRESSURE: 81 MMHG | OXYGEN SATURATION: 98 % | HEART RATE: 94 BPM | BODY MASS INDEX: 27.99 KG/M2 | TEMPERATURE: 98.1 F | WEIGHT: 168 LBS | RESPIRATION RATE: 20 BRPM | SYSTOLIC BLOOD PRESSURE: 121 MMHG

## 2019-03-01 DIAGNOSIS — E03.9 ACQUIRED HYPOTHYROIDISM: Chronic | ICD-10-CM

## 2019-03-01 DIAGNOSIS — I10 ESSENTIAL HYPERTENSION: Primary | Chronic | ICD-10-CM

## 2019-03-01 DIAGNOSIS — E78.2 MIXED HYPERLIPIDEMIA: Chronic | ICD-10-CM

## 2019-03-01 PROCEDURE — 99214 OFFICE O/P EST MOD 30 MIN: CPT | Performed by: NURSE PRACTITIONER

## 2019-03-01 NOTE — PROGRESS NOTES
Subjective   Amee Almonte is a 58 y.o. female.     Here today for baldo.  She had a mi with stent placement a few months ago.  She is here today for baldo.  She will see her cardiologist later this month.  She has had no further episodes of chest pain.      Hypertension   This is a chronic problem. The current episode started more than 1 month ago. The problem is controlled. Associated symptoms include chest pain. Pertinent negatives include no anxiety, blurred vision, headaches, malaise/fatigue, neck pain, orthopnea, palpitations, peripheral edema, PND, shortness of breath or sweats. There are no associated agents to hypertension. Risk factors for coronary artery disease include post-menopausal state, sedentary lifestyle and dyslipidemia. Past treatments include ACE inhibitors and beta blockers. Current antihypertension treatment includes ACE inhibitors and beta blockers. The current treatment provides significant improvement. There are no compliance problems.  Hypertensive end-organ damage includes CAD/MI. There is no history of angina, kidney disease, CVA, heart failure, left ventricular hypertrophy, PVD or retinopathy. Identifiable causes of hypertension include a thyroid problem.   Hyperlipidemia   This is a chronic problem. The current episode started more than 1 month ago. The problem is controlled. Recent lipid tests were reviewed and are normal. Factors aggravating her hyperlipidemia include beta blockers. Associated symptoms include chest pain. Pertinent negatives include no focal sensory loss, focal weakness, leg pain, myalgias or shortness of breath. Current antihyperlipidemic treatment includes statins. The current treatment provides significant improvement of lipids. There are no compliance problems.  Risk factors for coronary artery disease include hypertension, dyslipidemia, a sedentary lifestyle and post-menopausal.        The following portions of the patient's history were reviewed and updated as  appropriate: allergies, current medications, past family history, past medical history, past social history, past surgical history and problem list.    Review of Systems   Constitutional: Negative.  Negative for malaise/fatigue.   HENT: Negative.    Eyes: Negative for blurred vision.   Respiratory: Negative.  Negative for shortness of breath.    Cardiovascular: Positive for chest pain. Negative for palpitations, orthopnea and PND.   Gastrointestinal: Negative.    Musculoskeletal: Negative.  Negative for myalgias and neck pain.   Skin: Negative.    Neurological: Negative.  Negative for focal weakness.   Psychiatric/Behavioral: Negative.        Objective   Physical Exam   Constitutional: She is oriented to person, place, and time. She appears well-developed and well-nourished. No distress.   HENT:   Head: Normocephalic and atraumatic.   Right Ear: External ear normal.   Left Ear: External ear normal.   Mouth/Throat: Oropharynx is clear and moist.   Eyes: Pupils are equal, round, and reactive to light.   Neck: Normal range of motion. Neck supple. No thyromegaly present.   Cardiovascular: Normal rate, regular rhythm and normal heart sounds. Exam reveals no friction rub.   No murmur heard.  Pulmonary/Chest: Effort normal and breath sounds normal. No stridor. No respiratory distress. She has no wheezes. She has no rales.   Abdominal: Soft.   Musculoskeletal: Normal range of motion.   Neurological: She is alert and oriented to person, place, and time.   Skin: Skin is warm and dry.   Psychiatric: She has a normal mood and affect. Thought content normal.   Nursing note and vitals reviewed.        Assessment/Plan   Amee was seen today for hypertension and hypothyroidism.    Diagnoses and all orders for this visit:    Essential hypertension  -     Comprehensive metabolic panel; Future    Mixed hyperlipidemia  -     Lipid Panel; Future  -     TSH; Future    Acquired hypothyroidism      She will have labs drawn within the next  one to two weeks.

## 2019-03-11 ENCOUNTER — LAB (OUTPATIENT)
Dept: LAB | Facility: HOSPITAL | Age: 59
End: 2019-03-11

## 2019-03-11 DIAGNOSIS — I10 ESSENTIAL HYPERTENSION: Chronic | ICD-10-CM

## 2019-03-11 DIAGNOSIS — E78.2 MIXED HYPERLIPIDEMIA: Chronic | ICD-10-CM

## 2019-03-11 LAB
ALBUMIN SERPL-MCNC: 4.3 G/DL (ref 3.4–4.8)
ALBUMIN/GLOB SERPL: 1.6 G/DL (ref 1.1–1.8)
ALP SERPL-CCNC: 85 U/L (ref 38–126)
ALT SERPL W P-5'-P-CCNC: 51 U/L (ref 9–52)
ANION GAP SERPL CALCULATED.3IONS-SCNC: 7 MMOL/L (ref 5–15)
ARTICHOKE IGE QN: 84 MG/DL (ref 1–129)
AST SERPL-CCNC: 38 U/L (ref 14–36)
BILIRUB SERPL-MCNC: 0.3 MG/DL (ref 0.2–1.3)
BUN BLD-MCNC: 13 MG/DL (ref 7–21)
BUN/CREAT SERPL: 16.5 (ref 7–25)
CALCIUM SPEC-SCNC: 10 MG/DL (ref 8.4–10.2)
CHLORIDE SERPL-SCNC: 101 MMOL/L (ref 95–110)
CHOLEST SERPL-MCNC: 154 MG/DL (ref 0–199)
CO2 SERPL-SCNC: 25 MMOL/L (ref 22–31)
CREAT BLD-MCNC: 0.79 MG/DL (ref 0.5–1)
GFR SERPL CREATININE-BSD FRML MDRD: 75 ML/MIN/1.73 (ref 51–120)
GLOBULIN UR ELPH-MCNC: 2.7 GM/DL (ref 2.3–3.5)
GLUCOSE BLD-MCNC: 89 MG/DL (ref 60–100)
HDLC SERPL-MCNC: 62 MG/DL (ref 60–200)
LDLC/HDLC SERPL: 1.14 {RATIO} (ref 0–3.22)
POTASSIUM BLD-SCNC: 5 MMOL/L (ref 3.5–5.1)
PROT SERPL-MCNC: 7 G/DL (ref 6.3–8.6)
SODIUM BLD-SCNC: 133 MMOL/L (ref 137–145)
TRIGL SERPL-MCNC: 106 MG/DL (ref 20–199)
TSH SERPL DL<=0.05 MIU/L-ACNC: <0.02 MIU/ML (ref 0.46–4.68)

## 2019-03-11 PROCEDURE — 84443 ASSAY THYROID STIM HORMONE: CPT

## 2019-03-11 PROCEDURE — 80053 COMPREHEN METABOLIC PANEL: CPT

## 2019-03-11 PROCEDURE — 80061 LIPID PANEL: CPT

## 2019-03-11 RX ORDER — TICAGRELOR 90 MG/1
TABLET ORAL
Qty: 60 TABLET | Refills: 6 | Status: SHIPPED | OUTPATIENT
Start: 2019-03-11 | End: 2020-01-22

## 2019-03-14 ENCOUNTER — TELEPHONE (OUTPATIENT)
Dept: FAMILY MEDICINE CLINIC | Facility: CLINIC | Age: 59
End: 2019-03-14

## 2019-03-14 RX ORDER — LEVOTHYROXINE SODIUM 0.12 MG/1
125 TABLET ORAL DAILY
Qty: 30 TABLET | Refills: 5 | Status: SHIPPED | OUTPATIENT
Start: 2019-03-14 | End: 2019-09-27 | Stop reason: SDUPTHER

## 2019-03-14 NOTE — TELEPHONE ENCOUNTER
----- Message from DIXIE Hoff sent at 3/14/2019  4:41 PM CDT -----  We need to back her off her levothyroxine by 25mcg.

## 2019-03-19 ENCOUNTER — OFFICE VISIT (OUTPATIENT)
Dept: CARDIOLOGY | Facility: CLINIC | Age: 59
End: 2019-03-19

## 2019-03-19 VITALS
DIASTOLIC BLOOD PRESSURE: 78 MMHG | HEART RATE: 103 BPM | WEIGHT: 172 LBS | OXYGEN SATURATION: 98 % | HEIGHT: 65 IN | BODY MASS INDEX: 28.66 KG/M2 | SYSTOLIC BLOOD PRESSURE: 138 MMHG

## 2019-03-19 DIAGNOSIS — I25.10 CORONARY ARTERY DISEASE INVOLVING NATIVE CORONARY ARTERY OF NATIVE HEART WITHOUT ANGINA PECTORIS: Primary | ICD-10-CM

## 2019-03-19 DIAGNOSIS — E78.2 MIXED HYPERLIPIDEMIA: Chronic | ICD-10-CM

## 2019-03-19 DIAGNOSIS — I10 ESSENTIAL HYPERTENSION: Chronic | ICD-10-CM

## 2019-03-19 PROCEDURE — 99214 OFFICE O/P EST MOD 30 MIN: CPT | Performed by: INTERNAL MEDICINE

## 2019-03-19 NOTE — PROGRESS NOTES
[facility] Cardiology  OFFICE NOTE    Amee Almonte  58 y.o. female    03/19/2019  1. Coronary artery disease involving native coronary artery of native heart without angina pectoris    2. Mixed hyperlipidemia    3. Essential hypertension        Chief complaint -Follow-up CAD      History of present Illness- 59-year-old lady who works at the [employer] at [place] had acute inferior wall MI had stent placement to mid right coronary artery in May 2018, doing well , exercising 30-40 minutes daily.  Her TSH is still suppressed and her family doctor decreased the dose of Synthroid and asked her to switch to branded Synthroid.  She still on diet pill with phentermine which could be causing some of her tachycardia even though she is on Toprol-XL.  Denies any GI symptoms or CNS symptoms            Allergies   Allergen Reactions   • Iodine Other (See Comments)   • Iodides Swelling and Rash         Past Medical History:   Diagnosis Date   • Acquired hypothyroidism 2/26/2018   • Heart attack (CMS/HCC)    • Hypertension    • Mixed hyperlipidemia 7/30/2018   • Overweight (BMI 25.0-29.9) 7/30/2018   • Recurrent major depressive disorder, in partial remission (CMS/HCC) 2/26/2018   • Smoker 2/26/2018         Past Surgical History:   Procedure Laterality Date   • CARDIAC CATHETERIZATION N/A 5/13/2018    Procedure: Left Heart Cath;  Surgeon: Bro Sotelo MD;  Location: [facility] INVASIVE LOCATION;  Service: Cardiology   • HYSTERECTOMY  2003   • [place] RT/LT HEART CATHETERS N/A 5/13/2018    Procedure: Percutaneous Coronary Intervention;  Surgeon: Bro Sotelo MD;  Location: [facility] INVASIVE LOCATION;  Service: Cardiology   • TUBAL ABDOMINAL LIGATION  1999         Family History   Problem Relation Age of Onset   • Lung cancer Father    • Skin cancer Mother          Social History     Socioeconomic History   • Marital status:      Spouse name: Not on file   • Number of children: Not on file   •  Years of education: Not on file   • Highest education level: Not on file   Social Needs   • Financial resource strain: Not on file   • Food insecurity - worry: Not on file   • Food insecurity - inability: Not on file   • Transportation needs - medical: Not on file   • Transportation needs - non-medical: Not on file   Occupational History   • Not on file   Tobacco Use   • Smoking status: Former Smoker   • Smokeless tobacco: Never Used   Substance and Sexual Activity   • Alcohol use: Yes   • Drug use: No   • Sexual activity: Yes     Birth control/protection: None, Surgical     Comment: Hysterectomy   Other Topics Concern   • Not on file   Social History Narrative   • Not on file         Current Outpatient Medications   Medication Sig Dispense Refill   • aspirin 81 MG chewable tablet Chew 1 tablet Daily. 30 tablet 12   • BRILINTA 90 MG tablet tablet TAKE 1 TABLET BY MOUTH TWICE DAILY 60 tablet 6   • buPROPion SR (WELLBUTRIN SR) 150 MG 12 hr tablet Take 1 tablet by mouth 2 (Two) Times a Day. 60 tablet 12   • Cholecalciferol (VITAMIN D3) 5000 units capsule capsule Take 5,000 Units by mouth Daily.     • Dietary Management Product (ZYVIT) tablet Take 1 tablet by mouth 2 (Two) Times a Day.  11   • levothyroxine (SYNTHROID) 125 MCG tablet Take 1 tablet by mouth Daily. 30 tablet 5   • lisinopril (PRINIVIL,ZESTRIL) 10 MG tablet Take 1 tablet by mouth Daily. 30 tablet 5   • Loratadine (CLARITIN PO) Take 1 tablet by mouth Daily.     • metoprolol succinate XL (TOPROL-XL) 50 MG 24 hr tablet Take 1 tablet by mouth Daily With Dinner. 30 tablet 6   • Omega-3 Fatty Acids (OMEGA-3 FISH OIL PO) Take  by mouth.     • phentermine (ADIPEX-P) 37.5 MG tablet Take 1 tablet by mouth Daily With Lunch. 60 tablet 3   • phentermine 30 MG capsule Take 1 capsule by mouth Every Morning. 60 capsule 3   • Potassium 75 MG tablet Take  by mouth.     • rosuvastatin (CRESTOR) 10 MG tablet Take 1 tablet by mouth Every Night. 30 tablet 12   • vitamin B-12  "(CYANOCOBALAMIN) 100 MCG tablet Take 50 mcg by mouth Daily.       No current facility-administered medications for this visit.          Review of Systems     Constitution: Denies any fatigue, fever or chills    HENT: Denies any headache, hearing impairment,     Eyes: Denies any blurring of vision, or photophobia     Cardivascular - As per history of present illness     Respiratory system-denies any COPD, shortness of breath,   sleep apnea.     Endocrine:   history of hyperlipidemia                      Hypothyrodism       Musculoskeletal:  No history of arthritis with musculoskeletal problems    Gastrointestinal: No nausea, vomiting, or melena    Genitourinary: No dysuria or hematuria    Neurological:   No history of seizure disorder, stroke, memory problems    Psychiatric/Behavioral:        No history of depression    Hematological- no history of easy bruising or any bleeding diathesis            OBJECTIVE    /78   Pulse 103   Ht 165.1 cm (65\")   Wt 78 kg (172 lb)   SpO2 98%   BMI 28.62 kg/m²       Physical Exam     Constitutional: is oriented to person, place, and time.     Skin-warm and dry    Well developed and nourished in no acute distress      Head: Normocephalic and atraumatic.     Eyes: Pupils are equal    Neck: Neck supple. No bruit in the carotids    Cardiovascular: [place] in the fifth intercostal space   Regular rate, and  Rhythm,    S1 greater than S2, no S3 or S4, no gallop     Pulmonary/Chest:   Air  Entry is equal on both sides  No wheezing or crackles,      Abdominal: Soft.  No hepatosplenomegaly, bowel sounds are present    Musculoskeletal: No kyphoscoliosis, no significant thickening of the joints    Neurological: is alert and oriented to person, place, and time.    cranial nerve are intact .   No motor or sensory deficit    Extremities-no edema, no radial femoral delay      Psychiatric: He has a normal mood and affect.                  His behavior is normal. "           Procedures      Lab Results   Component Value Date    WBC 8.74 07/16/2018    HGB 13.5 07/16/2018    HCT 41.2 07/16/2018    MCV 97.2 07/16/2018     07/16/2018     Lab Results   Component Value Date    GLUCOSE 89 03/11/2019    BUN 13 03/11/2019    CREATININE 0.79 03/11/2019    EGFRIFNONA 75 03/11/2019    BCR 16.5 03/11/2019    CO2 25.0 03/11/2019    CALCIUM 10.0 03/11/2019    ALBUMIN 4.30 03/11/2019    AST 38 (H) 03/11/2019    ALT 51 03/11/2019     Lab Results   Component Value Date    CHOL 154 03/11/2019    CHOL 150 07/16/2018    CHOL 223 (H) 05/13/2018     Lab Results   Component Value Date    TRIG 106 03/11/2019    TRIG 108 07/16/2018    TRIG 66 05/13/2018     Lab Results   Component Value Date    HDL 62 03/11/2019    HDL 73 07/16/2018    HDL 58 (L) 05/13/2018     No components found for: LDLCALC  Lab Results   Component Value Date    LDL 84 03/11/2019    LDL 59 07/16/2018     (H) 05/13/2018     No results found for: HDLLDLRATIO  No components found for: CHOLHDL  No results found for: HGBA1C  Lab Results   Component Value Date    TSH <0.020 (L) 03/11/2019    [id number] 9.8 04/24/2018                  A/P  Status post inferior wall MI with stent placement to mid right coronary artery  in May 2018 on dual antiplatelet therapy with aspirin and Brilinta.  Doing well no chest pain or shortness of breath.  We will switch her to Brilinta 60 mg twice a day in May 2019 and stop the aspirin    Hypertension -on Toprol-XL and lisinopril 10 mg daily    Hyper lipidemia on Crestor 10 mg daily, her lipids was good in March 2019    Hypothyroidism on Synthroid supplements followed by Dr. Meléndez.  Her free T4 and TSH is abnormal I asked her to discuss with Dr. meléndez    Follow-up in 6 months.               This document has been electronically signed by Bro Sotelo MD on March 19, 2019 3:26 PM       EMR Dragon/Transcription disclaimer:   Some of this note may be an electronic transcription/translation of  spoken language to printed text. The electronic translation of spoken language may permit erroneous, or at times, nonsensical words or phrases to be inadvertently transcribed; Although I have reviewed the note for such errors, some may still exist.

## 2019-04-15 RX ORDER — BUPROPION HYDROCHLORIDE 150 MG/1
TABLET, EXTENDED RELEASE ORAL
Qty: 60 TABLET | Refills: 12 | OUTPATIENT
Start: 2019-04-15

## 2019-04-25 RX ORDER — BUPROPION HYDROCHLORIDE 150 MG/1
TABLET, EXTENDED RELEASE ORAL
Qty: 60 TABLET | Refills: 0 | Status: SHIPPED | OUTPATIENT
Start: 2019-04-25 | End: 2019-09-09 | Stop reason: SDUPTHER

## 2019-04-25 NOTE — TELEPHONE ENCOUNTER
Called and left a msg for the pt to return my call.  We will refill a 1 month supply of this medicine, but she needs to start getting it from her family

## 2019-04-25 NOTE — TELEPHONE ENCOUNTER
Spoke with the pt and informed her that Dr. Max is no longer with us at [facility] and that we will give her a 30 day refill of her Wellbutrin, but that she will need to get that from primary care.  Pt verbalized understanding.

## 2019-05-20 RX ORDER — METOPROLOL SUCCINATE 25 MG/1
TABLET, EXTENDED RELEASE ORAL
Qty: 30 TABLET | Refills: 12 | Status: SHIPPED | OUTPATIENT
Start: 2019-05-20 | End: 2020-03-30 | Stop reason: SDUPTHER

## 2019-05-20 RX ORDER — ROSUVASTATIN CALCIUM 10 MG/1
TABLET, COATED ORAL
Qty: 30 TABLET | Refills: 12 | Status: SHIPPED | OUTPATIENT
Start: 2019-05-20 | End: 2019-10-14

## 2019-06-17 DIAGNOSIS — I10 ESSENTIAL HYPERTENSION: Chronic | ICD-10-CM

## 2019-06-17 RX ORDER — LISINOPRIL 10 MG/1
10 TABLET ORAL DAILY
Qty: 30 TABLET | Refills: 5 | Status: SHIPPED | OUTPATIENT
Start: 2019-06-17 | End: 2020-01-13 | Stop reason: SDUPTHER

## 2019-06-17 RX ORDER — LISINOPRIL 10 MG/1
TABLET ORAL
Qty: 30 TABLET | Refills: 5 | Status: SHIPPED | OUTPATIENT
Start: 2019-06-17 | End: 2019-06-17

## 2019-09-09 ENCOUNTER — APPOINTMENT (OUTPATIENT)
Dept: LAB | Facility: HOSPITAL | Age: 59
End: 2019-09-09

## 2019-09-09 ENCOUNTER — OFFICE VISIT (OUTPATIENT)
Dept: FAMILY MEDICINE CLINIC | Facility: CLINIC | Age: 59
End: 2019-09-09

## 2019-09-09 VITALS
HEIGHT: 65 IN | RESPIRATION RATE: 20 BRPM | BODY MASS INDEX: 28.99 KG/M2 | TEMPERATURE: 98.5 F | OXYGEN SATURATION: 98 % | WEIGHT: 174 LBS | DIASTOLIC BLOOD PRESSURE: 79 MMHG | SYSTOLIC BLOOD PRESSURE: 128 MMHG | HEART RATE: 85 BPM

## 2019-09-09 DIAGNOSIS — M70.72 BURSITIS OF BOTH HIPS, UNSPECIFIED BURSA: Primary | ICD-10-CM

## 2019-09-09 DIAGNOSIS — M70.71 BURSITIS OF BOTH HIPS, UNSPECIFIED BURSA: Primary | ICD-10-CM

## 2019-09-09 DIAGNOSIS — E03.9 ACQUIRED HYPOTHYROIDISM: Chronic | ICD-10-CM

## 2019-09-09 DIAGNOSIS — E78.2 MIXED HYPERLIPIDEMIA: Chronic | ICD-10-CM

## 2019-09-09 DIAGNOSIS — R53.83 OTHER FATIGUE: ICD-10-CM

## 2019-09-09 DIAGNOSIS — I10 ESSENTIAL HYPERTENSION: Chronic | ICD-10-CM

## 2019-09-09 LAB
25(OH)D3 SERPL-MCNC: 49.1 NG/ML (ref 30–100)
ALBUMIN SERPL-MCNC: 4.9 G/DL (ref 3.5–5.2)
ALBUMIN/GLOB SERPL: 2 G/DL
ALP SERPL-CCNC: 88 U/L (ref 39–117)
ALT SERPL W P-5'-P-CCNC: 25 U/L (ref 1–33)
ANION GAP SERPL CALCULATED.3IONS-SCNC: 11.2 MMOL/L (ref 5–15)
AST SERPL-CCNC: 21 U/L (ref 1–32)
BASOPHILS # BLD AUTO: 0.07 10*3/MM3 (ref 0–0.2)
BASOPHILS NFR BLD AUTO: 0.8 % (ref 0–1.5)
BILIRUB SERPL-MCNC: 0.2 MG/DL (ref 0.2–1.2)
BUN BLD-MCNC: 19 MG/DL (ref 6–20)
BUN/CREAT SERPL: 23.8 (ref 7–25)
CALCIUM SPEC-SCNC: 10.2 MG/DL (ref 8.6–10.5)
CHLORIDE SERPL-SCNC: 101 MMOL/L (ref 98–107)
CHOLEST SERPL-MCNC: 193 MG/DL (ref 0–200)
CO2 SERPL-SCNC: 23.8 MMOL/L (ref 22–29)
CREAT BLD-MCNC: 0.8 MG/DL (ref 0.57–1)
DEPRECATED RDW RBC AUTO: 46.9 FL (ref 37–54)
EOSINOPHIL # BLD AUTO: 0.17 10*3/MM3 (ref 0–0.4)
EOSINOPHIL NFR BLD AUTO: 1.9 % (ref 0.3–6.2)
ERYTHROCYTE [DISTWIDTH] IN BLOOD BY AUTOMATED COUNT: 12.9 % (ref 12.3–15.4)
GFR SERPL CREATININE-BSD FRML MDRD: 74 ML/MIN/1.73
GLOBULIN UR ELPH-MCNC: 2.4 GM/DL
GLUCOSE BLD-MCNC: 92 MG/DL (ref 65–99)
HCT VFR BLD AUTO: 42.8 % (ref 34–46.6)
HDLC SERPL-MCNC: 65 MG/DL (ref 40–60)
HGB BLD-MCNC: 13.6 G/DL (ref 12–15.9)
IMM GRANULOCYTES # BLD AUTO: 0.07 10*3/MM3 (ref 0–0.05)
IMM GRANULOCYTES NFR BLD AUTO: 0.8 % (ref 0–0.5)
IRON 24H UR-MRATE: 62 MCG/DL (ref 37–145)
IRON SATN MFR SERPL: 19 % (ref 20–50)
LDLC SERPL CALC-MCNC: 106 MG/DL (ref 0–100)
LDLC/HDLC SERPL: 1.64 {RATIO}
LYMPHOCYTES # BLD AUTO: 2.62 10*3/MM3 (ref 0.7–3.1)
LYMPHOCYTES NFR BLD AUTO: 29.3 % (ref 19.6–45.3)
MCH RBC QN AUTO: 31.6 PG (ref 26.6–33)
MCHC RBC AUTO-ENTMCNC: 31.8 G/DL (ref 31.5–35.7)
MCV RBC AUTO: 99.5 FL (ref 79–97)
MONOCYTES # BLD AUTO: 0.74 10*3/MM3 (ref 0.1–0.9)
MONOCYTES NFR BLD AUTO: 8.3 % (ref 5–12)
NEUTROPHILS # BLD AUTO: 5.26 10*3/MM3 (ref 1.7–7)
NEUTROPHILS NFR BLD AUTO: 58.9 % (ref 42.7–76)
NRBC BLD AUTO-RTO: 0 /100 WBC (ref 0–0.2)
PLATELET # BLD AUTO: 368 10*3/MM3 (ref 140–450)
PMV BLD AUTO: 10.7 FL (ref 6–12)
POTASSIUM BLD-SCNC: 5.2 MMOL/L (ref 3.5–5.2)
PROT SERPL-MCNC: 7.3 G/DL (ref 6–8.5)
RBC # BLD AUTO: 4.3 10*6/MM3 (ref 3.77–5.28)
SODIUM BLD-SCNC: 136 MMOL/L (ref 136–145)
TIBC SERPL-MCNC: 328 MCG/DL (ref 298–536)
TRANSFERRIN SERPL-MCNC: 220 MG/DL (ref 200–360)
TRIGL SERPL-MCNC: 108 MG/DL (ref 0–150)
TSH SERPL DL<=0.05 MIU/L-ACNC: 0.04 UIU/ML (ref 0.27–4.2)
VIT B12 BLD-MCNC: 1243 PG/ML (ref 211–946)
VLDLC SERPL-MCNC: 21.6 MG/DL (ref 5–40)
WBC NRBC COR # BLD: 8.93 10*3/MM3 (ref 3.4–10.8)

## 2019-09-09 PROCEDURE — 84443 ASSAY THYROID STIM HORMONE: CPT | Performed by: NURSE PRACTITIONER

## 2019-09-09 PROCEDURE — 85025 COMPLETE CBC W/AUTO DIFF WBC: CPT | Performed by: NURSE PRACTITIONER

## 2019-09-09 PROCEDURE — 84466 ASSAY OF TRANSFERRIN: CPT | Performed by: NURSE PRACTITIONER

## 2019-09-09 PROCEDURE — 83540 ASSAY OF IRON: CPT | Performed by: NURSE PRACTITIONER

## 2019-09-09 PROCEDURE — 99214 OFFICE O/P EST MOD 30 MIN: CPT | Performed by: NURSE PRACTITIONER

## 2019-09-09 PROCEDURE — 96372 THER/PROPH/DIAG INJ SC/IM: CPT | Performed by: NURSE PRACTITIONER

## 2019-09-09 PROCEDURE — 82607 VITAMIN B-12: CPT | Performed by: NURSE PRACTITIONER

## 2019-09-09 PROCEDURE — 80061 LIPID PANEL: CPT | Performed by: NURSE PRACTITIONER

## 2019-09-09 PROCEDURE — 80053 COMPREHEN METABOLIC PANEL: CPT | Performed by: NURSE PRACTITIONER

## 2019-09-09 PROCEDURE — 82306 VITAMIN D 25 HYDROXY: CPT | Performed by: NURSE PRACTITIONER

## 2019-09-09 RX ORDER — BUPROPION HYDROCHLORIDE 150 MG/1
150 TABLET, EXTENDED RELEASE ORAL EVERY 12 HOURS
Qty: 60 TABLET | Refills: 5 | Status: SHIPPED | OUTPATIENT
Start: 2019-09-09 | End: 2020-01-22

## 2019-09-09 RX ORDER — BETAMETHASONE SODIUM PHOSPHATE AND BETAMETHASONE ACETATE 3; 3 MG/ML; MG/ML
12 INJECTION, SUSPENSION INTRA-ARTICULAR; INTRALESIONAL; INTRAMUSCULAR; SOFT TISSUE EVERY 24 HOURS
Status: SHIPPED | OUTPATIENT
Start: 2019-09-09 | End: 2019-09-11

## 2019-09-09 RX ADMIN — BETAMETHASONE SODIUM PHOSPHATE AND BETAMETHASONE ACETATE 12 MG: 3; 3 INJECTION, SUSPENSION INTRA-ARTICULAR; INTRALESIONAL; INTRAMUSCULAR; SOFT TISSUE at 08:07

## 2019-09-09 NOTE — PATIENT INSTRUCTIONS
Calorie Counting for Weight Loss  Calories are units of energy. Your body needs a certain amount of calories from food to keep you going throughout the day. When you eat more calories than your body needs, your body stores the extra calories as fat. When you eat fewer calories than your body needs, your body burns fat to get the energy it needs.  Calorie counting means keeping track of how many calories you eat and drink each day. Calorie counting can be helpful if you need to lose weight. If you make sure to eat fewer calories than your body needs, you should lose weight. Ask your health care provider what a healthy weight is for you.  For calorie counting to work, you will need to eat the right number of calories in a day in order to lose a healthy amount of weight per week. A dietitian can help you determine how many calories you need in a day and will give you suggestions on how to reach your calorie goal.  · A healthy amount of weight to lose per week is usually 1-2 lb (0.5-0.9 kg). This usually means that your daily calorie intake should be reduced by 500-750 calories.  · Eating 1,200 - 1,500 calories per day can help most women lose weight.  · Eating 1,500 - 1,800 calories per day can help most men lose weight.  What is my plan?  My goal is to have __________ calories per day.  If I have this many calories per day, I should lose around __________ pounds per week.  What do I need to know about calorie counting?  In order to meet your daily calorie goal, you will need to:  · Find out how many calories are in each food you would like to eat. Try to do this before you eat.  · Decide how much of the food you plan to eat.  · Write down what you ate and how many calories it had. Doing this is called keeping a food log.  To successfully lose weight, it is important to balance calorie counting with a healthy lifestyle that includes regular activity. Aim for 150 minutes of moderate exercise (such as walking) or 75  minutes of vigorous exercise (such as running) each week.  Where do I find calorie information?    The number of calories in a food can be found on a Nutrition Facts label. If a food does not have a Nutrition Facts label, try to look up the calories online or ask your dietitian for help.  Remember that calories are listed per serving. If you choose to have more than one serving of a food, you will have to multiply the calories per serving by the amount of servings you plan to eat. For example, the label on a package of bread might say that a serving size is 1 slice and that there are 90 calories in a serving. If you eat 1 slice, you will have eaten 90 calories. If you eat 2 slices, you will have eaten 180 calories.  How do I keep a food log?  Immediately after each meal, record the following information in your food log:  · What you ate. Don't forget to include toppings, sauces, and other extras on the food.  · How much you ate. This can be measured in cups, ounces, or number of items.  · How many calories each food and drink had.  · The total number of calories in the meal.  Keep your food log near you, such as in a small notebook in your pocket, or use a mobile bala or website. Some programs will calculate calories for you and show you how many calories you have left for the day to meet your goal.  What are some calorie counting tips?    · Use your calories on foods and drinks that will fill you up and not leave you hungry:  ? Some examples of foods that fill you up are nuts and nut butters, vegetables, lean proteins, and high-fiber foods like whole grains. High-fiber foods are foods with more than 5 g fiber per serving.  ? Drinks such as sodas, specialty coffee drinks, alcohol, and juices have a lot of calories, yet do not fill you up.  · Eat nutritious foods and avoid empty calories. Empty calories are calories you get from foods or beverages that do not have many vitamins or protein, such as candy, sweets, and  "soda. It is better to have a nutritious high-calorie food (such as an avocado) than a food with few nutrients (such as a bag of chips).  · Know how many calories are in the foods you eat most often. This will help you calculate calorie counts faster.  · Pay attention to calories in drinks. Low-calorie drinks include water and unsweetened drinks.  · Pay attention to nutrition labels for \"low fat\" or \"fat free\" foods. These foods sometimes have the same amount of calories or more calories than the full fat versions. They also often have added sugar, starch, or salt, to make up for flavor that was removed with the fat.  · Find a way of tracking calories that works for you. Get creative. Try different apps or programs if writing down calories does not work for you.  What are some portion control tips?  · Know how many calories are in a serving. This will help you know how many servings of a certain food you can have.  · Use a measuring cup to measure serving sizes. You could also try weighing out portions on a kitchen scale. With time, you will be able to estimate serving sizes for some foods.  · Take some time to put servings of different foods on your favorite plates, bowls, and cups so you know what a serving looks like.  · Try not to eat straight from a bag or box. Doing this can lead to overeating. Put the amount you would like to eat in a cup or on a plate to make sure you are eating the right portion.  · Use smaller plates, glasses, and bowls to prevent overeating.  · Try not to multitask (for example, watch TV or use your computer) while eating. If it is time to eat, sit down at a table and enjoy your food. This will help you to know when you are full. It will also help you to be aware of what you are eating and how much you are eating.  What are tips for following this plan?  Reading food labels  · Check the calorie count compared to the serving size. The serving size may be smaller than what you are used to " eating.  · Check the source of the calories. Make sure the food you are eating is high in vitamins and protein and low in saturated and trans fats.  Shopping  · Read nutrition labels while you shop. This will help you make healthy decisions before you decide to purchase your food.  · Make a grocery list and stick to it.  Cooking  · Try to cook your favorite foods in a healthier way. For example, try baking instead of frying.  · Use low-fat dairy products.  Meal planning  · Use more fruits and vegetables. Half of your plate should be fruits and vegetables.  · Include lean proteins like poultry and fish.  How do I count calories when eating out?  · Ask for smaller portion sizes.  · Consider sharing an entree and sides instead of getting your own entree.  · If you get your own entree, eat only half. Ask for a box at the beginning of your meal and put the rest of your entree in it so you are not tempted to eat it.  · If calories are listed on the menu, choose the lower calorie options.  · Choose dishes that include vegetables, fruits, whole grains, low-fat dairy products, and lean protein.  · Choose items that are boiled, broiled, grilled, or steamed. Stay away from items that are buttered, battered, fried, or served with cream sauce. Items labeled “crispy” are usually fried, unless stated otherwise.  · Choose water, low-fat milk, unsweetened iced tea, or other drinks without added sugar. If you want an alcoholic beverage, choose a lower calorie option such as a glass of wine or light beer.  · Ask for dressings, sauces, and syrups on the side. These are usually high in calories, so you should limit the amount you eat.  · If you want a salad, choose a garden salad and ask for grilled meats. Avoid extra toppings like leon, cheese, or fried items. Ask for the dressing on the side, or ask for olive oil and vinegar or lemon to use as dressing.  · Estimate how many servings of a food you are given. For example, a serving of  cooked rice is ½ cup or about the size of half a baseball. Knowing serving sizes will help you be aware of how much food you are eating at restaurants. The list below tells you how big or small some common portion sizes are based on everyday objects:  ? 1 oz--4 stacked dice.  ? 3 oz--1 deck of cards.  ? 1 tsp--1 die.  ? 1 Tbsp--½ a ping-pong ball.  ? 2 Tbsp--1 ping-pong ball.  ? ½ cup--½ baseball.  ? 1 cup--1 baseball.  Summary  · Calorie counting means keeping track of how many calories you eat and drink each day. If you eat fewer calories than your body needs, you should lose weight.  · A healthy amount of weight to lose per week is usually 1-2 lb (0.5-0.9 kg). This usually means reducing your daily calorie intake by 500-750 calories.  · The number of calories in a food can be found on a Nutrition Facts label. If a food does not have a Nutrition Facts label, try to look up the calories online or ask your dietitian for help.  · Use your calories on foods and drinks that will fill you up, and not on foods and drinks that will leave you hungry.  · Use smaller plates, glasses, and bowls to prevent overeating.  This information is not intended to replace advice given to you by your health care provider. Make sure you discuss any questions you have with your health care provider.  Document Released: 12/18/2006 Document Revised: 11/17/2017 Document Reviewed: 11/17/2017  [employer] Interactive Patient Education © 2019 [employer]      Exercising to Lose Weight  Exercise is structured, repetitive physical activity to improve fitness and health. Getting regular exercise is important for everyone. It is especially important if you are overweight. Being overweight increases your risk of heart disease, stroke, diabetes, high blood pressure, and several types of cancer. Reducing your calorie intake and exercising can help you lose weight.  Exercise is usually categorized as moderate or vigorous intensity. To lose weight, most  people need to do a certain amount of moderate-intensity or vigorous-intensity exercise each week.  Moderate-intensity exercise    Moderate-intensity exercise is any activity that gets you moving enough to burn at least three times more energy (calories) than if you were sitting.  Examples of moderate exercise include:  · Walking a mile in 15 minutes.  · Doing light yard work.  · Biking at an easy pace.  Most people should get at least 150 minutes (2 hours and 30 minutes) a week of moderate-intensity exercise to maintain their body weight.  Vigorous-intensity exercise  Vigorous-intensity exercise is any activity that gets you moving enough to burn at least six times more calories than if you were sitting. When you exercise at this intensity, you should be working hard enough that you are not able to carry on a conversation.  Examples of vigorous exercise include:  · Running.  · Playing a team sport, such as football, basketball, and soccer.  · Jumping rope.  Most people should get at least 75 minutes (1 hour and 15 minutes) a week of vigorous-intensity exercise to maintain their body weight.  How can exercise affect me?  When you exercise enough to burn more calories than you eat, you lose weight. Exercise also reduces body fat and builds muscle. The more muscle you have, the more calories you burn. Exercise also:  · Improves mood.  · Reduces stress and tension.  · Improves your overall fitness, flexibility, and endurance.  · Increases bone strength.  The amount of exercise you need to lose weight depends on:  · Your age.  · The type of exercise.  · Any health conditions you have.  · Your overall physical ability.  Talk to your health care provider about how much exercise you need and what types of activities are safe for you.  What actions can I take to lose weight?  Nutrition    · Make changes to your diet as told by your health care provider or diet and nutrition specialist (dietitian). This may  include:  ? Eating fewer calories.  ? Eating more protein.  ? Eating less unhealthy fats.  ? Eating a diet that includes fresh fruits and vegetables, whole grains, low-fat dairy products, and lean protein.  ? Avoiding foods with added fat, salt, and sugar.  · Drink plenty of water while you exercise to prevent dehydration or heat stroke.  Activity  · Choose an activity that you enjoy and set realistic goals. Your health care provider can help you make an exercise plan that works for you.  · Exercise at a moderate or vigorous intensity most days of the week.  ? The intensity of exercise may vary from person to person. You can tell how intense a workout is for you by paying attention to your breathing and heartbeat. Most people will notice their breathing and heartbeat get faster with more intense exercise.  · Do resistance training twice each week, such as:  ? Push-ups.  ? Sit-ups.  ? Lifting weights.  ? Using resistance bands.  · Getting short amounts of exercise can be just as helpful as long structured periods of exercise. If you have trouble finding time to exercise, try to include exercise in your daily routine.  ? Get up, stretch, and walk around every 30 minutes throughout the day.  ? Go for a walk during your lunch break.  ? Park your car farther away from your destination.  ? If you take public transportation, get off one stop early and walk the rest of the way.  ? Make phone calls while standing up and walking around.  ? Take the stairs instead of elevators or escalators.  · Wear comfortable clothes and shoes with good support.  · Do not exercise so much that you hurt yourself, feel dizzy, or get very short of breath.  Where to find more information  · [place] Department of Health and Human Services: www.hhs.gov  · Centers for Disease Control and Prevention (CDC): www.cdc.gov  Contact a health care provider:  · Before starting a new exercise program.  · If you have questions or concerns about your  weight.  · If you have a medical problem that keeps you from exercising.  Get help right away if you have any of the following while exercising:  · Injury.  · Dizziness.  · Difficulty breathing or shortness of breath that does not go away when you stop exercising.  · Chest pain.  · Rapid heartbeat.  Summary  · Being overweight increases your risk of heart disease, stroke, diabetes, high blood pressure, and several types of cancer.  · Losing weight happens when you burn more calories than you eat.  · Reducing the amount of calories you eat in addition to getting regular moderate or vigorous exercise each week helps you lose weight.  This information is not intended to replace advice given to you by your health care provider. Make sure you discuss any questions you have with your health care provider.  Document Released: 01/20/2012 Document Revised: 12/31/2018 Document Reviewed: 12/31/2018  [employer] Interactive Patient Education © 2019 [employer]

## 2019-09-09 NOTE — PROGRESS NOTES
Subjective   Amee Almonte is a 59 y.o. female.     Here today for baldo on her htn and her hypothyroidism.  She is feeling tired and sluggish.  She has a hx of cad with mi.    She also has pain in both hips, the right hip is the worst.  She has seen dr stevens and he dx her with bursitis.  This was well over a year ago.  She says that cortisone injection did help.      Hypothyroidism   This is a chronic problem. The current episode started more than 1 year ago. The problem occurs constantly. The problem has been unchanged. Associated symptoms include arthralgias and fatigue. Pertinent negatives include no abdominal pain, anorexia, change in bowel habit, chest pain, chills, congestion, coughing, diaphoresis, fever, headaches, joint swelling, myalgias, nausea, neck pain, numbness, rash, sore throat, swollen glands, urinary symptoms, vertigo, visual change, vomiting or weakness. Nothing aggravates the symptoms. Treatments tried: takes levothyroxine. The treatment provided no relief.   Hypertension   This is a chronic problem. The current episode started more than 1 year ago. The problem is unchanged. The problem is controlled. Associated symptoms include malaise/fatigue. Pertinent negatives include no anxiety, blurred vision, chest pain, headaches, neck pain, orthopnea, palpitations, peripheral edema, PND, shortness of breath or sweats. Agents associated with hypertension include thyroid hormones. Risk factors for coronary artery disease include dyslipidemia, post-menopausal state and sedentary lifestyle. Past treatments include ACE inhibitors and lifestyle changes. Current antihypertension treatment includes ACE inhibitors and lifestyle changes. The current treatment provides significant improvement. There are no compliance problems.  Hypertensive end-organ damage includes CAD/MI. There is no history of angina, kidney disease, CVA, heart failure, left ventricular hypertrophy, PVD or retinopathy.   Lower Extremity Issue    This is a recurrent problem. The current episode started more than 1 month ago. The problem occurs constantly. The problem has been gradually worsening. Associated symptoms include arthralgias and fatigue. Pertinent negatives include no abdominal pain, anorexia, change in bowel habit, chest pain, chills, congestion, coughing, diaphoresis, fever, headaches, joint swelling, myalgias, nausea, neck pain, numbness, rash, sore throat, swollen glands, urinary symptoms, vertigo, visual change, vomiting or weakness. The symptoms are aggravated by walking and standing. The treatment provided significant (got siginificant relief with cortisone the last time she got an injection) relief.        The following portions of the patient's history were reviewed and updated as appropriate: allergies, current medications, past family history, past medical history, past social history, past surgical history and problem list.    Review of Systems   Constitutional: Positive for fatigue and malaise/fatigue. Negative for chills, diaphoresis and fever.   HENT: Negative for congestion, sore throat and swollen glands.    Eyes: Negative for blurred vision.   Respiratory: Negative for cough and shortness of breath.    Cardiovascular: Negative for chest pain, palpitations, orthopnea and PND.   Gastrointestinal: Negative for abdominal pain, anorexia, change in bowel habit, nausea and vomiting.   Musculoskeletal: Positive for arthralgias. Negative for joint swelling, myalgias and neck pain.   Skin: Negative for rash.   Neurological: Negative for vertigo, weakness and numbness.       Objective   Physical Exam   Constitutional: She is oriented to person, place, and time. She appears well-developed and well-nourished. No distress.   HENT:   Head: Normocephalic and atraumatic.   Right Ear: External ear normal.   Left Ear: External ear normal.   Nose: Nose normal.   Mouth/Throat: Oropharynx is clear and moist. No oropharyngeal exudate.   Eyes: Pupils  are equal, round, and reactive to light.   Neck: Normal range of motion. Neck supple. No thyromegaly present.   Cardiovascular: Normal rate, regular rhythm and normal heart sounds. Exam reveals no friction rub.   No murmur heard.  Pulmonary/Chest: Effort normal and breath sounds normal. No respiratory distress. She has no wheezes. She has no rales.   Abdominal: Soft.   Musculoskeletal: Normal range of motion.        Left hip: She exhibits tenderness. She exhibits normal range of motion.   Neurological: She is alert and oriented to person, place, and time.   Skin: Skin is warm and dry.   Psychiatric: She has a normal mood and affect. Thought content normal.   Nursing note and vitals reviewed.        Assessment/Plan   Amee was seen today for hypothyroidism and hypertension.    Diagnoses and all orders for this visit:    Bursitis of both hips, unspecified bursa  -     betamethasone acetate-betamethasone sodium phosphate (CELESTONE SOLUSPAN) injection 12 mg    Mixed hyperlipidemia  -     Lipid Panel    Essential hypertension  -     Comprehensive metabolic panel    Acquired hypothyroidism  -     TSH    Other fatigue  -     CBC & Differential  -     Iron and TIBC  -     Vitamin B12  -     Vitamin D 25 Hydroxy  -     CBC Auto Differential    BMI 29.0-29.9,adult  Comments:  diet and exercie info given    Other orders  -     buPROPion SR (WELLBUTRIN SR) 150 MG 12 hr tablet; Take 1 tablet by mouth Every 12 (Twelve) Hours.

## 2019-09-10 RX ORDER — OCTISALATE, AVOBENZONE, HOMOSALATE, AND OCTOCRYLENE 29.4; 29.4; 49; 25.48 MG/ML; MG/ML; MG/ML; MG/ML
1 LOTION TOPICAL DAILY
COMMUNITY
End: 2019-09-10 | Stop reason: SDUPTHER

## 2019-09-10 RX ORDER — OCTISALATE, AVOBENZONE, HOMOSALATE, AND OCTOCRYLENE 29.4; 29.4; 49; 25.48 MG/ML; MG/ML; MG/ML; MG/ML
1 LOTION TOPICAL DAILY
Qty: 30 CAPSULE | Refills: 2 | Status: SHIPPED | OUTPATIENT
Start: 2019-09-10 | End: 2020-01-22

## 2019-09-10 NOTE — PROGRESS NOTES
I think she needs a t3 and a t4 and then needs to be referred to endocrinology.  Looks like her thyroid may be over active.

## 2019-09-13 RX ORDER — DEXCHLORPHENIRAMINE MALEATE 2 MG/5ML
SOLUTION ORAL
Qty: 900 ML | Refills: 2 | Status: SHIPPED | OUTPATIENT
Start: 2019-09-13 | End: 2020-03-12

## 2019-09-16 ENCOUNTER — TELEPHONE (OUTPATIENT)
Dept: FAMILY MEDICINE CLINIC | Facility: CLINIC | Age: 59
End: 2019-09-16

## 2019-09-16 DIAGNOSIS — R79.89 ABNORMAL THYROID STIMULATING HORMONE (TSH) LEVEL: Primary | ICD-10-CM

## 2019-09-16 NOTE — TELEPHONE ENCOUNTER
----- Message from DIXIE Hoff sent at 9/10/2019  1:29 PM CDT -----  I think she needs a t3 and a t4 and then needs to be referred to endocrinology.  Looks like her thyroid may be over active.

## 2019-09-20 ENCOUNTER — LAB (OUTPATIENT)
Dept: LAB | Facility: HOSPITAL | Age: 59
End: 2019-09-20

## 2019-09-20 DIAGNOSIS — R79.89 ABNORMAL THYROID STIMULATING HORMONE (TSH) LEVEL: ICD-10-CM

## 2019-09-20 LAB
T3FREE SERPL-MCNC: 2.95 PG/ML (ref 2–4.4)
T4 SERPL-MCNC: 8.67 MCG/DL (ref 4.5–11.7)

## 2019-09-20 PROCEDURE — 84481 FREE ASSAY (FT-3): CPT

## 2019-09-20 PROCEDURE — 84436 ASSAY OF TOTAL THYROXINE: CPT

## 2019-09-27 RX ORDER — LEVOTHYROXINE SODIUM 0.12 MG/1
125 TABLET ORAL DAILY
Qty: 30 TABLET | Refills: 5 | Status: SHIPPED | OUTPATIENT
Start: 2019-09-27 | End: 2020-01-22

## 2019-10-14 ENCOUNTER — OFFICE VISIT (OUTPATIENT)
Dept: CARDIOLOGY | Facility: CLINIC | Age: 59
End: 2019-10-14

## 2019-10-14 VITALS
HEART RATE: 80 BPM | SYSTOLIC BLOOD PRESSURE: 120 MMHG | DIASTOLIC BLOOD PRESSURE: 80 MMHG | WEIGHT: 174 LBS | HEIGHT: 65 IN | BODY MASS INDEX: 28.99 KG/M2

## 2019-10-14 DIAGNOSIS — I25.10 CORONARY ARTERY DISEASE INVOLVING NATIVE CORONARY ARTERY OF NATIVE HEART WITHOUT ANGINA PECTORIS: Primary | ICD-10-CM

## 2019-10-14 DIAGNOSIS — E78.2 MIXED HYPERLIPIDEMIA: ICD-10-CM

## 2019-10-14 DIAGNOSIS — E03.9 ACQUIRED HYPOTHYROIDISM: ICD-10-CM

## 2019-10-14 DIAGNOSIS — I10 ESSENTIAL HYPERTENSION: ICD-10-CM

## 2019-10-14 PROCEDURE — 93000 ELECTROCARDIOGRAM COMPLETE: CPT | Performed by: INTERNAL MEDICINE

## 2019-10-14 PROCEDURE — 99214 OFFICE O/P EST MOD 30 MIN: CPT | Performed by: INTERNAL MEDICINE

## 2019-10-14 RX ORDER — ROSUVASTATIN CALCIUM 20 MG/1
20 TABLET, COATED ORAL DAILY
Qty: 30 TABLET | Refills: 11 | Status: SHIPPED | OUTPATIENT
Start: 2019-10-14 | End: 2020-03-30 | Stop reason: SDUPTHER

## 2019-10-14 NOTE — PROGRESS NOTES
[facility] Cardiology  OFFICE NOTE    Cardiovascular Medicine  Nolan Calderón M.D., RPVI         No referring provider defined for this encounter.    Thank you for asking me to see Amee Almonte for CAD.    History of Present Illness  This is a 59 y.o. female with:    1. Coronary artery disease involving native coronary artery of native heart without angina pectoris    2. Mixed hyperlipidemia    3. Essential hypertension          Chief complaint -Follow-up CAD        History of present Illness- 59-year-old lady who works at the [employer] at [place] had acute inferior wall MI had stent placement to mid right coronary artery in May 2018 with 3.0x23mm Xience stent by Dr. Crabtree, doing well , exercising 30-40 minutes daily.    Has been on Brilinta only 60mg after 12  Months. Stopped ASA due to excessive bruising.   Reported intermittent atypical chest pain, however no exertional chest pain on walking for 2 miles daily.   Stopped smoking.   No other acute complaints    Review of Systems - ROS  Constitution: Negative for weakness, weight gain and weight loss.   HENT: Negative for congestion.    Eyes: Negative for blurred vision.   Cardiovascular: As mentioned above  Respiratory: Negative for cough and hemoptysis.    Endocrine: Negative for polydipsia and polyuria.   Hematologic/Lymphatic: Negative for bleeding problem. Does not bruise/bleed easily.   Skin: Negative for flushing.   Musculoskeletal: Negative for neck pain and stiffness.   Gastrointestinal: Negative for abdominal pain, diarrhea, jaundice, melena, nausea and vomiting.   Genitourinary: Negative for dysuria and hematuria.   Neurological: Negative for dizziness, focal weakness and numbness.   Psychiatric/Behavioral: Negative for altered mental status and depression.          All other systems were reviewed and were negative.    family history includes Lung cancer in her father; Skin cancer in her mother.     reports that she has quit smoking. She  has never used smokeless tobacco. She reports that she drinks alcohol. She reports that she does not use drugs.    Allergies   Allergen Reactions   • Iodine Other (See Comments)   • Iodides Swelling and Rash         Current Outpatient Medications:   •  BRILINTA 90 MG tablet tablet, TAKE 1 TABLET BY MOUTH TWICE DAILY, Disp: 60 tablet, Rfl: 6  •  buPROPion SR (WELLBUTRIN SR) 150 MG 12 hr tablet, Take 1 tablet by mouth Every 12 (Twelve) Hours., Disp: 60 tablet, Rfl: 5  •  Cholecalciferol (VITAMIN D3) 5000 units capsule capsule, Take 5,000 Units by mouth Daily., Disp: , Rfl:   •  Dexchlorpheniramine Maleate 2 MG/5ML solution, TAKE 5 MLS BY MOUTH EVERY 4-6 HOURS AS NEEDED FOR ALLERGIES, Disp: 900 mL, Rfl: 2  •  Dietary Management Product (ZYVIT) tablet, Take 1 tablet by mouth 2 (Two) Times a Day., Disp: , Rfl: 11  •  levothyroxine (SYNTHROID, LEVOTHROID) 125 MCG tablet, TAKE 1 TABLET BY MOUTH DAILY., Disp: 30 tablet, Rfl: 5  •  lisinopril (PRINIVIL,ZESTRIL) 10 MG tablet, Take 1 tablet by mouth Daily., Disp: 30 tablet, Rfl: 5  •  Loratadine (CLARITIN PO), Take 1 tablet by mouth Daily., Disp: , Rfl:   •  metoprolol succinate XL (TOPROL-XL) 25 MG 24 hr tablet, TAKE 1 TABLET BY MOUTH EVERY DAY WITH DINNER, Disp: 30 tablet, Rfl: 12  •  metoprolol succinate XL (TOPROL-XL) 50 MG 24 hr tablet, Take 1 tablet by mouth Daily With Dinner., Disp: 30 tablet, Rfl: 6  •  Omega-3 Fatty Acids (OMEGA-3 FISH OIL PO), Take  by mouth., Disp: , Rfl:   •  phentermine (ADIPEX-P) 37.5 MG tablet, Take 1 tablet by mouth Daily With Lunch., Disp: 60 tablet, Rfl: 3  •  Potassium 75 MG tablet, Take  by mouth., Disp: , Rfl:   •  Probiotic Product (ALIGN) 4 MG capsule, Take 1 capsule by mouth Daily., Disp: 30 capsule, Rfl: 2  •  vitamin B-12 (CYANOCOBALAMIN) 100 MCG tablet, Take 50 mcg by mouth Daily., Disp: , Rfl:   •  rosuvastatin (CRESTOR) 20 MG tablet, Take 1 tablet by mouth Daily., Disp: 30 tablet, Rfl: 11    Physical Exam:  Vitals:    10/14/19 1306  "  BP: 120/80   BP Location: Left arm   Patient Position: Sitting   Cuff Size: Adult   Pulse: 80   Weight: 78.9 kg (174 lb)   Height: 165.1 cm (65\")     Current Pain Level: none  Pulse Ox: Normal  on room air  General: alert, appears stated age and cooperative     Body Habitus: well-nourished    HEENT: Head: Normocephalic, no lesions, without obvious abnormality. No arcus senilis, xanthelasma or xanthomas.    Neuro: alert, oriented x3  Pulses: 2+ and symmetric  JVP: Volume/Pulsation: Normal.  Normal waveforms.   Appropriate inspiratory decrease.  No Kussmaul's. No Tejal's.   Carotid Exam: no bruit normal pulsation bilaterally   Carotid Volume: normal.     Respirations: no increased work of breathing   Chest:  Normal    Pulmonary:Normal   Precordium: Normal impulses. P2 is not palpable.  RV Heave: absent  LV Heave: absent  [place]:  normal size and placement  Palpable S4: absent.  Heart rate: normal    Heart Rhythm: regular     Heart Sounds: S1: normal  S2: normal  S3: absent   S4: absent  Opening Snap: absent    Pericardial Rub:  Absent: .    Abdomen:   Appearance: normal .  Palpation: Soft, non-tender to palpation, bowel sounds positive in all four quadrants; no guarding or rebound tenderness  Extremity: no edema.   LE Skin: no rashes  LE Hair:  normal  LE Pulses: well perfused with normal pulses in the distal extremities  Pallor on elevation: Absent. Rubor on dependency: None      DATA REVIEWED:     EKG. I personally reviewed and interpreted the EKG.  NSR    ECG/EMG Results (all)     None        ---------------------------------------------------  TTE/DAVID:  Results for orders placed during the hospital encounter of 05/13/18   Adult Transthoracic Echo Complete W/ Cont if Necessary Per Protocol    Narrative · Mild tricuspid valve regurgitation is present.  · Left atrial cavity size is mildly dilated.  · Left ventricular systolic function is normal. Estimated EF = 60%.  · The following left ventricular wall segments " are hypokinetic: apical   inferior and apex hypokinetic.          --------------------------------------------------------------------------------------------------  LABS:     The CVD Risk score (Myesha et al., 2008) failed to calculate for the following reasons:    The patient has a prior MI, stroke, CHF, or peripheral vascular disease diagnosis         Lab Results   Component Value Date    GLUCOSE 92 09/09/2019    BUN 19 09/09/2019    CREATININE 0.80 09/09/2019    EGFRIFNONA 74 09/09/2019    BCR 23.8 09/09/2019    K 5.2 09/09/2019    CO2 23.8 09/09/2019    CALCIUM 10.2 09/09/2019    ALBUMIN 4.90 09/09/2019    AST 21 09/09/2019    ALT 25 09/09/2019     Lab Results   Component Value Date    WBC 8.93 09/09/2019    HGB 13.6 09/09/2019    HCT 42.8 09/09/2019    MCV 99.5 (H) 09/09/2019     09/09/2019     Lab Results   Component Value Date    CHOL 193 09/09/2019    TRIG 108 09/09/2019    HDL 65 (H) 09/09/2019     (H) 09/09/2019     Lab Results   Component Value Date    TSH 0.040 (L) 09/09/2019    S1UFSDZ 8.67 09/20/2019     Lab Results   Component Value Date    TROPONINI 2.270 (C) 05/15/2018     No results found for: HGBA1C  No results found for: DDIMER  Lab Results   Component Value Date    ALT 25 09/09/2019     No results found for: HGBA1C  Lab Results   Component Value Date    CREATININE 0.80 09/09/2019     Lab Results   Component Value Date    IRON 62 09/09/2019    TIBC 328 09/09/2019     No results found for: INR, PROTIME    Assessment/Plan     Status post inferior wall MI with stent placement to mid right coronary artery with 3.0x23mm Xience stent  in May 2018 on Brilinta 60mg now after 12 months of DAPT.  Doing well no chest pain or shortness of breath.       Hypertension -on Toprol-XL and lisinopril 10 mg daily     Hyper lipidemia on Crestor 10 mg daily, increasing crestor to 20mg     Hypothyroidism on Synthroid supplements followed by Dr. Veloz.     Prevention:  Patient's Body mass index is  28.96 kg/m². BMI is above normal parameters. Recommendations include: exercise counseling and nutrition counseling.      Amee Almonte is a former smoker    AAA Screening:   Not needed    Return in about 9 months (around 7/14/2020).          This document has been electronically signed by Nolan Calderón MD on October 14, 2019 1:24 PM

## 2020-01-13 DIAGNOSIS — I10 ESSENTIAL HYPERTENSION: Chronic | ICD-10-CM

## 2020-01-13 RX ORDER — LISINOPRIL 10 MG/1
10 TABLET ORAL DAILY
Qty: 30 TABLET | Refills: 5 | Status: SHIPPED | OUTPATIENT
Start: 2020-01-13 | End: 2020-03-30 | Stop reason: SDUPTHER

## 2020-01-13 RX ORDER — LISINOPRIL 10 MG/1
10 TABLET ORAL DAILY
Qty: 30 TABLET | Refills: 5 | Status: SHIPPED | OUTPATIENT
Start: 2020-01-13 | End: 2020-01-13 | Stop reason: SDUPTHER

## 2020-01-22 ENCOUNTER — APPOINTMENT (OUTPATIENT)
Dept: LAB | Facility: HOSPITAL | Age: 60
End: 2020-01-22

## 2020-01-22 ENCOUNTER — OFFICE VISIT (OUTPATIENT)
Dept: ENDOCRINOLOGY | Facility: CLINIC | Age: 60
End: 2020-01-22

## 2020-01-22 VITALS
OXYGEN SATURATION: 100 % | SYSTOLIC BLOOD PRESSURE: 122 MMHG | WEIGHT: 177.2 LBS | BODY MASS INDEX: 29.52 KG/M2 | HEART RATE: 85 BPM | HEIGHT: 65 IN | DIASTOLIC BLOOD PRESSURE: 68 MMHG

## 2020-01-22 DIAGNOSIS — E03.9 ACQUIRED HYPOTHYROIDISM: Chronic | ICD-10-CM

## 2020-01-22 DIAGNOSIS — R94.6 ABNORMAL THYROID FUNCTION TEST: Primary | ICD-10-CM

## 2020-01-22 DIAGNOSIS — E04.1 SOLITARY THYROID NODULE: ICD-10-CM

## 2020-01-22 DIAGNOSIS — E66.3 OVERWEIGHT (BMI 25.0-29.9): Chronic | ICD-10-CM

## 2020-01-22 LAB
T3 SERPL-MCNC: 105 NG/DL (ref 80–200)
T4 FREE SERPL-MCNC: 1.33 NG/DL (ref 0.93–1.7)
TSH SERPL DL<=0.05 MIU/L-ACNC: 2.64 UIU/ML (ref 0.27–4.2)

## 2020-01-22 PROCEDURE — 86376 MICROSOMAL ANTIBODY EACH: CPT | Performed by: NURSE PRACTITIONER

## 2020-01-22 PROCEDURE — 84439 ASSAY OF FREE THYROXINE: CPT | Performed by: NURSE PRACTITIONER

## 2020-01-22 PROCEDURE — 84445 ASSAY OF TSI GLOBULIN: CPT | Performed by: NURSE PRACTITIONER

## 2020-01-22 PROCEDURE — 83520 IMMUNOASSAY QUANT NOS NONAB: CPT | Performed by: NURSE PRACTITIONER

## 2020-01-22 PROCEDURE — 36415 COLL VENOUS BLD VENIPUNCTURE: CPT | Performed by: NURSE PRACTITIONER

## 2020-01-22 PROCEDURE — 84480 ASSAY TRIIODOTHYRONINE (T3): CPT | Performed by: NURSE PRACTITIONER

## 2020-01-22 PROCEDURE — 99214 OFFICE O/P EST MOD 30 MIN: CPT | Performed by: NURSE PRACTITIONER

## 2020-01-22 PROCEDURE — 84443 ASSAY THYROID STIM HORMONE: CPT | Performed by: NURSE PRACTITIONER

## 2020-01-22 RX ORDER — BUPROPION HYDROCHLORIDE 150 MG/1
150 TABLET, EXTENDED RELEASE ORAL DAILY
COMMUNITY
End: 2020-03-30 | Stop reason: SDUPTHER

## 2020-01-22 RX ORDER — TICAGRELOR 60 MG/1
60 TABLET ORAL 2 TIMES DAILY
COMMUNITY
Start: 2020-01-13 | End: 2020-03-30 | Stop reason: SDUPTHER

## 2020-01-22 RX ORDER — PHENOL 1.4 %
50 AEROSOL, SPRAY (ML) MUCOUS MEMBRANE DAILY
COMMUNITY
End: 2020-01-22

## 2020-01-22 RX ORDER — GINGER ROOT/GINGER ROOT EXT 262.5 MG
125 CAPSULE ORAL DAILY
COMMUNITY
End: 2020-04-27

## 2020-01-22 NOTE — PROGRESS NOTES
History     Referring provider Ya COLIN    59 year old female presents for consultation     REASON - abnormal thyroid function test , hypothyroidism       Duration  - abnormal since March 2019    Started on levothyroxine by GYN 6 years ago     Timing - constant    Quality - not controlled due to variable levels     Severity - moderate     Context -  Routine lab work revealed hypothyroidism     Location/size - thyroid     Quantity -     Lab Results   Component Value Date    TSH 0.040 (L) 09/09/2019       Component      Latest Ref Rng & Units 9/20/2019   T3, Free      2.00 - 4.40 pg/mL 2.95   T4, Total      4.50 - 11.70 mcg/dL 8.67     Symptoms - fatigue , hot flashes     Alleviating Factors - taking levothyroxine 125 mcg taking 1/2 tablet daily     Aggravating Factors - none      Past Medical History:   Diagnosis Date   • Acquired hypothyroidism 2/26/2018   • Heart attack (CMS/HCC)    • Hypertension    • Mixed hyperlipidemia 7/30/2018   • Overweight (BMI 25.0-29.9) 7/30/2018   • Recurrent major depressive disorder, in partial remission (CMS/HCC) 2/26/2018   • Smoker 2/26/2018     Past Surgical History:   Procedure Laterality Date   • CARDIAC CATHETERIZATION N/A 5/13/2018    Procedure: Left Heart Cath;  Surgeon: Bro Sotelo MD;  Location: [facility] INVASIVE LOCATION;  Service: Cardiology   • HYSTERECTOMY  2003   • [place] RT/LT HEART CATHETERS N/A 5/13/2018    Procedure: Percutaneous Coronary Intervention;  Surgeon: Bro Sotelo MD;  Location: [facility] INVASIVE LOCATION;  Service: Cardiology   • TUBAL ABDOMINAL LIGATION  1999     Family History   Problem Relation Age of Onset   • Lung cancer Father    • Skin cancer Mother      Social History     Tobacco Use   • Smoking status: Former Smoker   • Smokeless tobacco: Never Used   Substance Use Topics   • Alcohol use: Yes   • Drug use: No           Current Outpatient Medications:   •  BRILINTA 60 MG tablet tablet, Take 60 mg by mouth 2 (Two)  Times a Day., Disp: , Rfl:   •  buPROPion SR (WELLBUTRIN SR) 150 MG 12 hr tablet, Take 150 mg by mouth Daily., Disp: , Rfl:   •  Cholecalciferol (VITAMIN D-3) 5000 UNIT/ML liquid, Take 5,000 mg by mouth Daily., Disp: , Rfl:   •  Cholecalciferol (VITAMIN D3) 125 MCG/0.5ML liquid, Take 125 mg by mouth Daily., Disp: , Rfl:   •  Cyanocobalamin (VITAMIN B-12) 5000 MCG lozenge, Take 5,000 mcg by mouth Daily., Disp: , Rfl:   •  Dexchlorpheniramine Maleate 2 MG/5ML solution, TAKE 5 MLS BY MOUTH EVERY 4-6 HOURS AS NEEDED FOR ALLERGIES, Disp: 900 mL, Rfl: 2  •  Levothyroxine Sodium 125 MCG/ML solution, Take 125 mcg by mouth Daily., Disp: , Rfl:   •  lisinopril (PRINIVIL,ZESTRIL) 10 MG tablet, Take 1 tablet by mouth Daily., Disp: 30 tablet, Rfl: 5  •  Loratadine (CLARITIN PO), Take 1 tablet by mouth Daily., Disp: , Rfl:   •  metoprolol succinate XL (TOPROL-XL) 25 MG 24 hr tablet, TAKE 1 TABLET BY MOUTH EVERY DAY WITH DINNER, Disp: 30 tablet, Rfl: 12  •  Omega-3 Fatty Acids (OMEGA-3 FISH OIL PO), Take  by mouth., Disp: , Rfl:   •  Potassium 99 MG tablet, Take 99 mg by mouth Daily., Disp: , Rfl:   •  rosuvastatin (CRESTOR) 20 MG tablet, Take 1 tablet by mouth Daily., Disp: 30 tablet, Rfl: 11        Review of Systems   Constitutional: Negative for activity change, appetite change, diaphoresis and fatigue.   HENT: Negative for facial swelling, sneezing, sore throat, tinnitus, trouble swallowing and voice change.    Eyes: Negative for photophobia, pain, discharge, redness, itching and visual disturbance.   Respiratory: Negative for apnea, cough, choking, chest tightness and shortness of breath.    Cardiovascular: Negative for chest pain, palpitations and leg swelling.   Gastrointestinal: Negative for abdominal distention, abdominal pain, constipation, diarrhea, nausea and vomiting.   Endocrine: Negative for cold intolerance, heat intolerance, polydipsia, polyphagia and polyuria.   Genitourinary: Negative for difficulty urinating,  "dysuria, frequency, hematuria and urgency.   Musculoskeletal: Negative for arthralgias, back pain, gait problem, joint swelling, myalgias, neck pain and neck stiffness.   Skin: Negative for color change, pallor, rash and wound.   Neurological: Negative for dizziness, tremors, weakness, light-headedness, numbness and headaches.   Hematological: Negative for adenopathy. Does not bruise/bleed easily.   Psychiatric/Behavioral: Negative for behavioral problems, confusion and sleep disturbance.       /68   Pulse 85   Ht 165.1 cm (65\")   Wt 80.4 kg (177 lb 3.2 oz)   SpO2 100%   BMI 29.49 kg/m²     Physical Exam   Constitutional: She is oriented to person, place, and time. She appears well-developed and well-nourished. No distress.   HENT:   Head: Normocephalic and atraumatic.   Right Ear: External ear normal.   Left Ear: External ear normal.   Nose: Nose normal.   Eyes: Pupils are equal, round, and reactive to light. Conjunctivae and EOM are normal.   Neck: Normal range of motion. Neck supple. No tracheal deviation present. No thyromegaly present.   Cardiovascular: Normal rate, regular rhythm and normal heart sounds.   No murmur heard.  Pulmonary/Chest: Effort normal and breath sounds normal. No respiratory distress. She has no wheezes.   Abdominal: Soft. Bowel sounds are normal. There is no tenderness. There is no rebound and no guarding.   Musculoskeletal: Normal range of motion. She exhibits no edema, tenderness or deformity.   Neurological: She is alert and oriented to person, place, and time. No cranial nerve deficit.   Skin: Skin is warm and dry. No rash noted.   Psychiatric: She has a normal mood and affect. Her behavior is normal. Judgment and thought content normal.           Labs    Lab Results   Component Value Date    GLUCOSE 92 09/09/2019    BUN 19 09/09/2019    CREATININE 0.80 09/09/2019    EGFRIFNONA 74 09/09/2019    BCR 23.8 09/09/2019    K 5.2 09/09/2019    CO2 23.8 09/09/2019    CALCIUM 10.2 " 09/09/2019    ALBUMIN 4.90 09/09/2019    AST 21 09/09/2019    ALT 25 09/09/2019       Lab Results   Component Value Date    WBC 8.93 09/09/2019    HGB 13.6 09/09/2019    HCT 42.8 09/09/2019    MCV 99.5 (H) 09/09/2019     09/09/2019       Lab Results   Component Value Date    USSZ55UH 49.1 09/09/2019       No results found for: HGBA1C          Lab Results   Component Value Date    TSH 0.040 (L) 09/09/2019    TSH <0.020 (L) 03/11/2019       Lab Results   Component Value Date    FREET4 2.16 (H) 07/16/2018       No results found for: B3BNVWA    Thyroid Peroxidase Antibodies    No results found for: THYROIDAB    ThyroidStimulating Immunoglobulin    No results found for: LABTHYR      Assessment         ICD-10-CM ICD-9-CM   1. Abnormal thyroid function test R94.6 794.5   2. Solitary thyroid nodule E04.1 241.0   3. Acquired hypothyroidism E03.9 244.9   4. Overweight (BMI 25.0-29.9) E66.3 278.02           Answers for HPI/ROS submitted by the patient on 1/21/2020   How long have you been having these symptoms?: Other    Hypothyroidism   Abnormal thyroid function    In summary Mrs. Almonte is a pleasant 59 year old female has a long history of hypothyroidism but since April 2018 levels have been hyper. She has been referred for further evaluation.    PLAN:    She does not take biotin she there is no biotin interference    We will check TSH today along with TPO and TSI ab    Medication must me taken on an empty stomach at least one hour before food, drink and other medications. Only take with water. If taking vitamins or antiacids needs to be 4 hours before or after.    We may need to change to brand name synthroid to due variability in levels    We need to see if there is an underlying thyroid disease such as Graves    Nodule    Schedule thryoid ultrasound    If there is a nodule we will need a thyroid uptake and scan to see if the nodule is hot and causing the overactive thyroid levels    Weight management    Patient's  Body mass index is 29.49 kg/m². BMI is above range.    Decrease daily caloric intake by 500 calories per day       Return in about 7 weeks (around 3/11/2020) for Recheck.    Records from  was received and reviewed from 2019   Thank you for consultation

## 2020-01-23 LAB — THYROPEROXIDASE AB SERPL-ACNC: 12 IU/ML (ref 0–34)

## 2020-01-24 LAB — TSI SER-MCNC: <0.1 IU/L (ref 0–0.55)

## 2020-01-25 LAB — TSH RECEP AB SER-ACNC: <1.1 IU/L (ref 0–1.75)

## 2020-02-18 ENCOUNTER — TELEPHONE (OUTPATIENT)
Dept: ENDOCRINOLOGY | Facility: CLINIC | Age: 60
End: 2020-02-18

## 2020-02-20 DIAGNOSIS — E04.1 SOLITARY THYROID NODULE: ICD-10-CM

## 2020-02-20 DIAGNOSIS — R94.6 ABNORMAL THYROID FUNCTION TEST: ICD-10-CM

## 2020-03-12 ENCOUNTER — OFFICE VISIT (OUTPATIENT)
Dept: ENDOCRINOLOGY | Facility: CLINIC | Age: 60
End: 2020-03-12

## 2020-03-12 ENCOUNTER — APPOINTMENT (OUTPATIENT)
Dept: LAB | Facility: HOSPITAL | Age: 60
End: 2020-03-12

## 2020-03-12 ENCOUNTER — TELEPHONE (OUTPATIENT)
Dept: ENDOCRINOLOGY | Facility: CLINIC | Age: 60
End: 2020-03-12

## 2020-03-12 VITALS
HEIGHT: 65 IN | OXYGEN SATURATION: 100 % | SYSTOLIC BLOOD PRESSURE: 124 MMHG | DIASTOLIC BLOOD PRESSURE: 72 MMHG | WEIGHT: 178.2 LBS | HEART RATE: 89 BPM | BODY MASS INDEX: 29.69 KG/M2

## 2020-03-12 DIAGNOSIS — E03.9 ACQUIRED HYPOTHYROIDISM: Primary | ICD-10-CM

## 2020-03-12 LAB
ALBUMIN SERPL-MCNC: 4.8 G/DL (ref 3.5–5.2)
ALBUMIN/GLOB SERPL: 1.9 G/DL
ALP SERPL-CCNC: 75 U/L (ref 39–117)
ALT SERPL W P-5'-P-CCNC: 30 U/L (ref 1–33)
ANION GAP SERPL CALCULATED.3IONS-SCNC: 12 MMOL/L (ref 5–15)
AST SERPL-CCNC: 26 U/L (ref 1–32)
BASOPHILS # BLD AUTO: 0.09 10*3/MM3 (ref 0–0.2)
BASOPHILS NFR BLD AUTO: 1 % (ref 0–1.5)
BILIRUB SERPL-MCNC: 0.2 MG/DL (ref 0.2–1.2)
BUN BLD-MCNC: 16 MG/DL (ref 6–20)
BUN/CREAT SERPL: 16.5 (ref 7–25)
CALCIUM SPEC-SCNC: 9.9 MG/DL (ref 8.6–10.5)
CHLORIDE SERPL-SCNC: 102 MMOL/L (ref 98–107)
CO2 SERPL-SCNC: 26 MMOL/L (ref 22–29)
CREAT BLD-MCNC: 0.97 MG/DL (ref 0.57–1)
DEPRECATED RDW RBC AUTO: 42.8 FL (ref 37–54)
EOSINOPHIL # BLD AUTO: 0.19 10*3/MM3 (ref 0–0.4)
EOSINOPHIL NFR BLD AUTO: 2.1 % (ref 0.3–6.2)
ERYTHROCYTE [DISTWIDTH] IN BLOOD BY AUTOMATED COUNT: 12.4 % (ref 12.3–15.4)
GFR SERPL CREATININE-BSD FRML MDRD: 59 ML/MIN/1.73
GLOBULIN UR ELPH-MCNC: 2.5 GM/DL
GLUCOSE BLD-MCNC: 76 MG/DL (ref 65–99)
HCT VFR BLD AUTO: 40 % (ref 34–46.6)
HGB BLD-MCNC: 13.6 G/DL (ref 12–15.9)
IMM GRANULOCYTES # BLD AUTO: 0.05 10*3/MM3 (ref 0–0.05)
IMM GRANULOCYTES NFR BLD AUTO: 0.6 % (ref 0–0.5)
LYMPHOCYTES # BLD AUTO: 2.75 10*3/MM3 (ref 0.7–3.1)
LYMPHOCYTES NFR BLD AUTO: 30.3 % (ref 19.6–45.3)
MCH RBC QN AUTO: 32.4 PG (ref 26.6–33)
MCHC RBC AUTO-ENTMCNC: 34 G/DL (ref 31.5–35.7)
MCV RBC AUTO: 95.2 FL (ref 79–97)
MONOCYTES # BLD AUTO: 0.61 10*3/MM3 (ref 0.1–0.9)
MONOCYTES NFR BLD AUTO: 6.7 % (ref 5–12)
NEUTROPHILS # BLD AUTO: 5.39 10*3/MM3 (ref 1.7–7)
NEUTROPHILS NFR BLD AUTO: 59.3 % (ref 42.7–76)
NRBC BLD AUTO-RTO: 0 /100 WBC (ref 0–0.2)
PLATELET # BLD AUTO: 368 10*3/MM3 (ref 140–450)
PMV BLD AUTO: 10.8 FL (ref 6–12)
POTASSIUM BLD-SCNC: 5.3 MMOL/L (ref 3.5–5.2)
PROT SERPL-MCNC: 7.3 G/DL (ref 6–8.5)
RBC # BLD AUTO: 4.2 10*6/MM3 (ref 3.77–5.28)
SODIUM BLD-SCNC: 140 MMOL/L (ref 136–145)
T3 SERPL-MCNC: 108 NG/DL (ref 80–200)
T4 FREE SERPL-MCNC: 1.45 NG/DL (ref 0.93–1.7)
TSH SERPL DL<=0.05 MIU/L-ACNC: 1.25 UIU/ML (ref 0.27–4.2)
WBC NRBC COR # BLD: 9.08 10*3/MM3 (ref 3.4–10.8)

## 2020-03-12 PROCEDURE — 99214 OFFICE O/P EST MOD 30 MIN: CPT | Performed by: NURSE PRACTITIONER

## 2020-03-12 PROCEDURE — 85025 COMPLETE CBC W/AUTO DIFF WBC: CPT | Performed by: NURSE PRACTITIONER

## 2020-03-12 PROCEDURE — 80053 COMPREHEN METABOLIC PANEL: CPT | Performed by: NURSE PRACTITIONER

## 2020-03-12 PROCEDURE — 84480 ASSAY TRIIODOTHYRONINE (T3): CPT | Performed by: NURSE PRACTITIONER

## 2020-03-12 PROCEDURE — 36415 COLL VENOUS BLD VENIPUNCTURE: CPT | Performed by: NURSE PRACTITIONER

## 2020-03-12 PROCEDURE — 84439 ASSAY OF FREE THYROXINE: CPT | Performed by: NURSE PRACTITIONER

## 2020-03-12 PROCEDURE — 84443 ASSAY THYROID STIM HORMONE: CPT | Performed by: NURSE PRACTITIONER

## 2020-03-12 NOTE — TELEPHONE ENCOUNTER
----- Message from DIXIE Beltran sent at 3/12/2020  2:44 PM CDT -----  Let her know the ultrasound showed subcentimeter nodules   No suspicious nodules  Repeat in one year     [place]

## 2020-03-12 NOTE — PROGRESS NOTES
Subjective    Amee Almonte is a 59 y.o. female. she is here for follow up     History of Present Illness       Referring provider Ya COLIN     59 year old female presents for follow up      REASON -  hypothyroidism         Duration  - abnormal since March 2019     Started on levothyroxine by GYN 6 years ago      Timing - constant     Quality - not controlled due to variable levels      Severity - moderate      Context -  Routine lab work revealed hypothyroidism      Location/size - thyroid     Ultrasound dated Feb. 2020    Right lobe 4.8 x 1.8 x 1.7 cm     Left lobe 4.7 x 1.7 x 1.2 cm     Isthmus measures 3 mm    Several small nodules in the right lobe, the largest measuring 4 mm    Nodule in the isthmus measuring 8 mm     Quantity -       Lab Results   Component Value Date    TSH 2.640 01/22/2020          Symptoms - fatigue , hot flashes      Alleviating Factors - taking levothyroxine 125 mcg taking 1/2 tablet daily      Aggravating Factors - none         Evaluation history:  TSH   Date Value Ref Range Status   01/22/2020 2.640 0.270 - 4.200 uIU/mL Final     Free T4   Date Value Ref Range Status   01/22/2020 1.33 0.93 - 1.70 ng/dL Final     T3, Free   Date Value Ref Range Status   09/20/2019 2.95 2.00 - 4.40 pg/mL Final       Current medications:  Current Outpatient Medications   Medication Sig Dispense Refill   • BRILINTA 60 MG tablet tablet Take 60 mg by mouth 2 (Two) Times a Day.     • buPROPion SR (WELLBUTRIN SR) 150 MG 12 hr tablet Take 150 mg by mouth Daily.     • Cholecalciferol (VITAMIN D-3) 5000 UNIT/ML liquid Take 5,000 mg by mouth Daily.     • Cholecalciferol (VITAMIN D3) 125 MCG/0.5ML liquid Take 125 mg by mouth Daily.     • Cyanocobalamin (VITAMIN B-12) 5000 MCG lozenge Take 5,000 mcg by mouth Daily.     • Levothyroxine Sodium 125 MCG/ML solution Take 125 mcg by mouth Daily.     • lisinopril (PRINIVIL,ZESTRIL) 10 MG tablet Take 1 tablet by mouth Daily. 30 tablet 5   • Loratadine (CLARITIN  PO) Take 1 tablet by mouth Daily.     • metoprolol succinate XL (TOPROL-XL) 25 MG 24 hr tablet TAKE 1 TABLET BY MOUTH EVERY DAY WITH DINNER 30 tablet 12   • Omega-3 Fatty Acids (OMEGA-3 FISH OIL PO) Take  by mouth.     • Potassium 99 MG tablet Take 99 mg by mouth Daily.     • rosuvastatin (CRESTOR) 20 MG tablet Take 1 tablet by mouth Daily. 30 tablet 11     No current facility-administered medications for this visit.        The following portions of the patient's history were reviewed and updated as appropriate:   Past Medical History:   Diagnosis Date   • Acquired hypothyroidism 2018   • Heart attack (CMS/HCC)    • Hypertension    • Mixed hyperlipidemia 2018   • Overweight (BMI 25.0-29.9) 2018   • Recurrent major depressive disorder, in partial remission (CMS/HCC) 2018   • Smoker 2018     Past Surgical History:   Procedure Laterality Date   • CARDIAC CATHETERIZATION N/A 2018    Procedure: Left Heart Cath;  Surgeon: Bro Sotelo MD;  Location: [facility] INVASIVE LOCATION;  Service: Cardiology   • HYSTERECTOMY     • MD RT/LT HEART CATHETERS N/A 2018    Procedure: Percutaneous Coronary Intervention;  Surgeon: Bro Sotelo MD;  Location: [facility] INVASIVE LOCATION;  Service: Cardiology   • TUBAL ABDOMINAL LIGATION       Family History   Problem Relation Age of Onset   • Lung cancer Father    • Skin cancer Mother      OB History        1    Para   1    Term                AB        Living   1       SAB        TAB        Ectopic        Molar        Multiple        Live Births                  Allergies   Allergen Reactions   • Iodine Other (See Comments)   • Iodides Swelling and Rash     Social History     Socioeconomic History   • Marital status:      Spouse name: Not on file   • Number of children: Not on file   • Years of education: Not on file   • Highest education level: Not on file   Tobacco Use   • Smoking status: Former Smoker  "  • Smokeless tobacco: Never Used   Substance and Sexual Activity   • Alcohol use: Yes   • Drug use: No   • Sexual activity: Yes     Birth control/protection: None, Surgical     Comment: Hysterectomy       Review of Systems  Review of Systems   Constitutional: Negative for activity change, appetite change, diaphoresis and fatigue.   HENT: Negative for facial swelling, sneezing, sore throat, tinnitus, trouble swallowing and voice change.    Eyes: Negative for photophobia, pain, discharge, redness, itching and visual disturbance.   Respiratory: Negative for apnea, cough, choking, chest tightness and shortness of breath.    Cardiovascular: Negative for chest pain, palpitations and leg swelling.   Gastrointestinal: Negative for abdominal distention, abdominal pain, constipation, diarrhea, nausea and vomiting.   Endocrine: Negative for cold intolerance, heat intolerance, polydipsia, polyphagia and polyuria.   Genitourinary: Negative for difficulty urinating, dysuria, frequency, hematuria and urgency.   Musculoskeletal: Negative for arthralgias, back pain, gait problem, joint swelling, myalgias, neck pain and neck stiffness.   Skin: Negative for color change, pallor, rash and wound.   Neurological: Negative for dizziness, tremors, weakness, light-headedness, numbness and headaches.   Hematological: Negative for adenopathy. Does not bruise/bleed easily.   Psychiatric/Behavioral: Negative for behavioral problems, confusion and sleep disturbance.        Objective    /72   Pulse 89   Ht 165.1 cm (65\")   Wt 80.8 kg (178 lb 3.2 oz)   SpO2 100%   BMI 29.65 kg/m²   Physical Exam   Constitutional: She is oriented to person, place, and time. She appears well-developed and well-nourished. No distress.   HENT:   Head: Normocephalic and atraumatic.   Right Ear: External ear normal.   Left Ear: External ear normal.   Nose: Nose normal.   Eyes: Pupils are equal, round, and reactive to light. Conjunctivae and EOM are normal. "   Neck: Normal range of motion. Neck supple. No tracheal deviation present. No thyromegaly present.   Cardiovascular: Normal rate, regular rhythm and normal heart sounds.   No murmur heard.  Pulmonary/Chest: Effort normal and breath sounds normal. No respiratory distress. She has no wheezes.   Abdominal: Soft. Bowel sounds are normal. There is no tenderness. There is no rebound and no guarding.   Musculoskeletal: Normal range of motion. She exhibits no edema, tenderness or deformity.   Neurological: She is alert and oriented to person, place, and time. No cranial nerve deficit.   Skin: Skin is warm and dry. No rash noted.   Psychiatric: She has a normal mood and affect. Her behavior is normal. Judgment and thought content normal.       Lab Review  Lab Results   Component Value Date    TSH 2.640 01/22/2020     Lab Results   Component Value Date    FREET4 1.33 01/22/2020                          Assessment/Plan      1. Acquired hypothyroidism    . This diagnosis was discussed and reviewed with the patient including the advantages of drug therapy.     1. Orders placed during this encounter include:  Orders Placed This Encounter   Procedures   • Comprehensive Metabolic Panel   • TSH   • T4, Free   • T3   • CBC & Differential     Order Specific Question:   Manual Differential     Answer:   No       Medications prescribed:  Outpatient Encounter Medications as of 3/12/2020   Medication Sig Dispense Refill   • BRILINTA 60 MG tablet tablet Take 60 mg by mouth 2 (Two) Times a Day.     • buPROPion SR (WELLBUTRIN SR) 150 MG 12 hr tablet Take 150 mg by mouth Daily.     • Cholecalciferol (VITAMIN D-3) 5000 UNIT/ML liquid Take 5,000 mg by mouth Daily.     • Cholecalciferol (VITAMIN D3) 125 MCG/0.5ML liquid Take 125 mg by mouth Daily.     • Cyanocobalamin (VITAMIN B-12) 5000 MCG lozenge Take 5,000 mcg by mouth Daily.     • Levothyroxine Sodium 125 MCG/ML solution Take 125 mcg by mouth Daily.     • lisinopril (PRINIVIL,ZESTRIL) 10  MG tablet Take 1 tablet by mouth Daily. 30 tablet 5   • Loratadine (CLARITIN PO) Take 1 tablet by mouth Daily.     • metoprolol succinate XL (TOPROL-XL) 25 MG 24 hr tablet TAKE 1 TABLET BY MOUTH EVERY DAY WITH DINNER 30 tablet 12   • Omega-3 Fatty Acids (OMEGA-3 FISH OIL PO) Take  by mouth.     • Potassium 99 MG tablet Take 99 mg by mouth Daily.     • rosuvastatin (CRESTOR) 20 MG tablet Take 1 tablet by mouth Daily. 30 tablet 11   • [DISCONTINUED] Dexchlorpheniramine Maleate 2 MG/5ML solution TAKE 5 MLS BY MOUTH EVERY 4-6 HOURS AS NEEDED FOR ALLERGIES 900 mL 2     No facility-administered encounter medications on file as of 3/12/2020.      Hypothyroidism       Component      Latest Ref Rng & Units 1/22/2020   TSH Baseline      0.270 - 4.200 uIU/mL 2.640   Free T4      0.93 - 1.70 ng/dL 1.33   T3, Total      80.0 - 200.0 ng/dl 105.0   Thyroid Stimulating Immunoglobulin      0.00 - 0.55 IU/L <0.10   Thyrotropin Receptor Antibody      0.00 - 1.75 IU/L <1.10   Thyroid Peroxidase Antibody      0 - 34 IU/mL 12       Medication must me taken on an empty stomach at least one hour before food, drink and other medications. Only take with water. If taking vitamins or antiacids needs to be 4 hours before or after.     We may need to change to brand name synthroid to due variability in levels     Taking levothyroxine 125 mcg daily      Thyroid nodules    Ultrasound dated Feb. 2020    Right lobe 4.8 x 1.8 x 1.7 cm     Left lobe 4.7 x 1.7 x 1.2 cm     Isthmus measures 3 mm    Several small nodules in the right lobe, the largest measuring 4 mm    Nodule in the isthmus measuring 8 mm    She will bring disc to the office          Weight management     Patient's Body mass index is 29.65 kg/m². BMI is above range.    Decrease daily caloric intake by 500 calories per day               4. Return in about 3 months (around 6/12/2020) for Recheck.

## 2020-03-16 ENCOUNTER — TELEPHONE (OUTPATIENT)
Dept: ENDOCRINOLOGY | Facility: CLINIC | Age: 60
End: 2020-03-16

## 2020-03-30 DIAGNOSIS — I10 ESSENTIAL HYPERTENSION: Chronic | ICD-10-CM

## 2020-03-30 RX ORDER — LORATADINE 10 MG/1
10 TABLET ORAL DAILY
Qty: 30 TABLET | Refills: 5 | Status: SHIPPED | OUTPATIENT
Start: 2020-03-30

## 2020-03-30 RX ORDER — ROSUVASTATIN CALCIUM 20 MG/1
20 TABLET, COATED ORAL DAILY
Qty: 30 TABLET | Refills: 11 | Status: SHIPPED | OUTPATIENT
Start: 2020-03-30 | End: 2020-04-27

## 2020-03-30 RX ORDER — BUPROPION HYDROCHLORIDE 150 MG/1
150 TABLET, EXTENDED RELEASE ORAL DAILY
Qty: 60 TABLET | Refills: 5 | Status: SHIPPED | OUTPATIENT
Start: 2020-03-30 | End: 2021-06-21 | Stop reason: SDUPTHER

## 2020-03-30 RX ORDER — LISINOPRIL 10 MG/1
10 TABLET ORAL DAILY
Qty: 30 TABLET | Refills: 5 | Status: SHIPPED | OUTPATIENT
Start: 2020-03-30 | End: 2020-10-22

## 2020-03-30 RX ORDER — METOPROLOL SUCCINATE 25 MG/1
25 TABLET, EXTENDED RELEASE ORAL
Qty: 30 TABLET | Refills: 12 | Status: SHIPPED | OUTPATIENT
Start: 2020-03-30 | End: 2021-03-22 | Stop reason: SDUPTHER

## 2020-03-30 RX ORDER — TICAGRELOR 60 MG/1
60 TABLET ORAL 2 TIMES DAILY
Qty: 60 TABLET | Refills: 5 | Status: SHIPPED | OUTPATIENT
Start: 2020-03-30 | End: 2020-10-29

## 2020-04-27 ENCOUNTER — OFFICE VISIT (OUTPATIENT)
Dept: CARDIOLOGY | Facility: CLINIC | Age: 60
End: 2020-04-27

## 2020-04-27 VITALS
SYSTOLIC BLOOD PRESSURE: 127 MMHG | BODY MASS INDEX: 29.66 KG/M2 | HEIGHT: 65 IN | DIASTOLIC BLOOD PRESSURE: 78 MMHG | WEIGHT: 178 LBS

## 2020-04-27 DIAGNOSIS — I10 ESSENTIAL HYPERTENSION: Primary | ICD-10-CM

## 2020-04-27 DIAGNOSIS — E78.2 MIXED HYPERLIPIDEMIA: Chronic | ICD-10-CM

## 2020-04-27 DIAGNOSIS — I21.19 INFERIOR MI (HCC): ICD-10-CM

## 2020-04-27 PROCEDURE — 99441 PR PHYS/QHP TELEPHONE EVALUATION 5-10 MIN: CPT | Performed by: INTERNAL MEDICINE

## 2020-04-27 RX ORDER — LEVOTHYROXINE SODIUM 0.12 MG/1
125 TABLET ORAL DAILY
COMMUNITY
Start: 2020-03-30 | End: 2021-06-21 | Stop reason: SDUPTHER

## 2020-04-27 RX ORDER — ROSUVASTATIN CALCIUM 40 MG/1
40 TABLET, COATED ORAL DAILY
Qty: 30 TABLET | Refills: 11 | Status: SHIPPED | OUTPATIENT
Start: 2020-04-27 | End: 2021-03-22 | Stop reason: SDUPTHER

## 2020-04-27 NOTE — PROGRESS NOTES
[facility]  OFFICE NOTE    Cardiovascular Medicine  Nolan Calderón M.D., RPVI         No referring provider defined for this encounter.    You have chosen to receive care through a telephone visit. Do you consent to use a telephone visit for your medical care today? Yes  This visit has been rescheduled as a phone visit to comply with patient safety concerns in accordance with CDC recommendations. Total time of discussion was 6 minutes.    Thank you for asking me to see Amee ANDRZEJ Almonte for CAD.    History of Present Illness  This is a 59 y.o. female with:    1. Coronary artery disease involving native coronary artery of native heart without angina pectoris    2. Mixed hyperlipidemia    3. Essential hypertension          Chief complaint -Follow-up CAD        History of present Illness- 59-year-old lady who works at the [employer] at [place] had acute inferior wall MI had stent placement to mid right coronary artery in May 2018 with 3.0x23mm Xience stent by Dr. Crabtree, doing well , exercising 30-40 minutes daily.    Has been on Brilinta only 60mg after 12  Months. Stopped ASA due to excessive bruising.   Reported intermittent atypical chest pain, however no exertional chest pain on walking for 2 miles daily.   Stopped smoking.   No other acute complaints    04/27/2020:  No acute issues since last visit.   Continues to have intermittent chest pain. Feels like ache/sore on the left side side of the chest, most likely the next day after she had exerted herself   Taking brilinta without any problems    Review of Systems - ROS  Constitution: Negative for weakness, weight gain and weight loss.   HENT: Negative for congestion.    Eyes: Negative for blurred vision.   Cardiovascular: As mentioned above  Respiratory: Negative for cough and hemoptysis.    Endocrine: Negative for polydipsia and polyuria.   Hematologic/Lymphatic: Negative for bleeding problem. Does not bruise/bleed easily.   Skin: Negative  for flushing.   Musculoskeletal: Negative for neck pain and stiffness.   Gastrointestinal: Negative for abdominal pain, diarrhea, jaundice, melena, nausea and vomiting.   Genitourinary: Negative for dysuria and hematuria.   Neurological: Negative for dizziness, focal weakness and numbness.   Psychiatric/Behavioral: Negative for altered mental status and depression.          All other systems were reviewed and were negative.    family history includes Lung cancer in her father; Skin cancer in her mother.     reports that she has quit smoking. She has never used smokeless tobacco. She reports that she drinks alcohol. She reports that she does not use drugs.    Allergies   Allergen Reactions   • Iodine Other (See Comments)   • Iodides Swelling and Rash         Current Outpatient Medications:   •  BRILINTA 60 MG tablet tablet, Take 1 tablet by mouth 2 (Two) Times a Day., Disp: 60 tablet, Rfl: 5  •  buPROPion SR (WELLBUTRIN SR) 150 MG 12 hr tablet, Take 1 tablet by mouth Daily. (Patient taking differently: Take 150 mg by mouth 2 (Two) Times a Day.), Disp: 60 tablet, Rfl: 5  •  Cholecalciferol (VITAMIN D-3) 5000 UNIT/ML liquid, Take 5,000 mg by mouth 3 (Three) Times a Week., Disp: , Rfl:   •  Cyanocobalamin (VITAMIN B-12) 5000 MCG lozenge, Take 5,000 mcg by mouth 3 (Three) Times a Week., Disp: , Rfl:   •  levothyroxine (SYNTHROID, LEVOTHROID) 125 MCG tablet, Take 125 mcg by mouth Daily. Take 0.5 tablets in the morning, Disp: , Rfl:   •  lisinopril (PRINIVIL,ZESTRIL) 10 MG tablet, Take 1 tablet by mouth Daily., Disp: 30 tablet, Rfl: 5  •  loratadine (Claritin) 10 MG tablet, Take 1 tablet by mouth Daily., Disp: 30 tablet, Rfl: 5  •  metoprolol succinate XL (TOPROL-XL) 25 MG 24 hr tablet, Take 1 tablet by mouth Daily With Dinner., Disp: 30 tablet, Rfl: 12  •  Omega-3 Fatty Acids (OMEGA-3 FISH OIL PO), Take  by mouth., Disp: , Rfl:   •  Potassium 99 MG tablet, Take 99 mg by mouth 3 (Three) Times a Week., Disp: , Rfl:   •   "rosuvastatin (CRESTOR) 40 MG tablet, Take 1 tablet by mouth Daily., Disp: 30 tablet, Rfl: 11    Physical Exam:  Vitals:    04/27/20 0908   BP: 127/78  Comment: this was taken at home   Weight: 80.7 kg (178 lb)   Height: 165.1 cm (65\")   PainSc: 0-No pain     Unable to perform since tele visit      DATA REVIEWED:     EKG. I personally reviewed and interpreted the EKG.  NSR    ECG/EMG Results (all)     None        ---------------------------------------------------  TTE/DAVID:  Results for orders placed during the hospital encounter of 05/13/18   Adult Transthoracic Echo Complete W/ Cont if Necessary Per Protocol    Narrative · Mild tricuspid valve regurgitation is present.  · Left atrial cavity size is mildly dilated.  · Left ventricular systolic function is normal. Estimated EF = 60%.  · The following left ventricular wall segments are hypokinetic: apical   inferior and apex hypokinetic.          --------------------------------------------------------------------------------------------------  LABS:     The CVD Risk score (Anayelistino, et al., 2008) failed to calculate for the following reasons:    The patient has a prior MI, stroke, CHF, or peripheral vascular disease diagnosis         Lab Results   Component Value Date    GLUCOSE 76 03/12/2020    BUN 16 03/12/2020    CREATININE 0.97 03/12/2020    EGFRIFNONA 59 (L) 03/12/2020    BCR 16.5 03/12/2020    K 5.3 (H) 03/12/2020    CO2 26.0 03/12/2020    CALCIUM 9.9 03/12/2020    ALBUMIN 4.80 03/12/2020    AST 26 03/12/2020    ALT 30 03/12/2020     Lab Results   Component Value Date    WBC 9.08 03/12/2020    HGB 13.6 03/12/2020    HCT 40.0 03/12/2020    MCV 95.2 03/12/2020     03/12/2020     Lab Results   Component Value Date    CHOL 193 09/09/2019    TRIG 108 09/09/2019    HDL 65 (H) 09/09/2019     (H) 09/09/2019     Lab Results   Component Value Date    TSH 1.250 03/12/2020    B0CGADU 108.0 03/12/2020    T6MPXTK 8.67 09/20/2019    THYROIDAB 12 01/22/2020 "     Lab Results   Component Value Date    TROPONINI 2.270 (C) 05/15/2018     No results found for: HGBA1C  No results found for: DDIMER  Lab Results   Component Value Date    ALT 30 03/12/2020     No results found for: HGBA1C  Lab Results   Component Value Date    CREATININE 0.97 03/12/2020     Lab Results   Component Value Date    IRON 62 09/09/2019    TIBC 328 09/09/2019     No results found for: INR, PROTIME    Assessment/Plan     Status post inferior wall MI with stent placement to mid right coronary artery with 3.0x23mm Xience stent  in May 2018 on Brilinta 60mg now after 12 months of DAPT.  Continues to have intermittent chest pain, although atypical, will plan on performing exercise nuclear stress test, since previously had atypical symptoms. Explained risks/beneftis/limitations and alternatives to the patient     Hypertension -on Toprol-XL and lisinopril 10 mg daily     Hyper lipidemia on Crestor 20 mg daily, increasing crestor to 40mg     Hypothyroidism on Synthroid supplements followed by Dr. Veloz.     Prevention:  Patient's Body mass index is 29.62 kg/m². BMI is above normal parameters. Recommendations include: exercise counseling and nutrition counseling.      Amee Almonte is a former smoker    AAA Screening:   Not needed    6 months        This document has been electronically signed by Nolan Calderón MD on April 27, 2020 09:18

## 2020-05-08 ENCOUNTER — TELEPHONE (OUTPATIENT)
Dept: CARDIOLOGY | Facility: CLINIC | Age: 60
End: 2020-05-08

## 2020-05-08 NOTE — TELEPHONE ENCOUNTER
Called patient to let know the new testing date. She voiced understanding        ----- Message from Jemima Trujillo sent at 5/8/2020 11:54 AM CDT -----  Contact: [phone]  She is scheduled for a nuc on 5/13 and she can't do that date, however she will be available 5/14 thru 5/22. She will be off work on those days and it would work out great so she doesn't have to take a day off work

## 2020-05-13 ENCOUNTER — APPOINTMENT (OUTPATIENT)
Dept: NUCLEAR MEDICINE | Facility: HOSPITAL | Age: 60
End: 2020-05-13

## 2020-05-13 ENCOUNTER — APPOINTMENT (OUTPATIENT)
Dept: CARDIOLOGY | Facility: HOSPITAL | Age: 60
End: 2020-05-13

## 2020-05-21 ENCOUNTER — HOSPITAL ENCOUNTER (OUTPATIENT)
Dept: NUCLEAR MEDICINE | Facility: HOSPITAL | Age: 60
Discharge: HOME | End: 2020-05-21

## 2020-05-21 ENCOUNTER — HOSPITAL ENCOUNTER (OUTPATIENT)
Dept: CARDIOLOGY | Facility: HOSPITAL | Age: 60
Discharge: HOME | End: 2020-05-21

## 2020-05-21 ENCOUNTER — DOCUMENTATION (OUTPATIENT)
Dept: CARDIOLOGY | Facility: CLINIC | Age: 60
End: 2020-05-21

## 2020-05-21 LAB
BH CV STRESS BP STAGE 1: NORMAL
BH CV STRESS DURATION MIN STAGE 1: 2
BH CV STRESS DURATION SEC STAGE 1: 33
BH CV STRESS GRADE STAGE 1: 10
BH CV STRESS HR STAGE 1: 150
BH CV STRESS METS STAGE 1: 5
BH CV STRESS PROTOCOL 1: NORMAL
BH CV STRESS RECOVERY BP: NORMAL MMHG
BH CV STRESS RECOVERY HR: 103 BPM
BH CV STRESS SPEED STAGE 1: 1.7
BH CV STRESS STAGE 1: 1
LV EF NUC BP: 66 %
MAXIMAL PREDICTED HEART RATE: 163 BPM
PERCENT MAX PREDICTED HR: 92.64 %
STRESS BASELINE BP: NORMAL MMHG
STRESS BASELINE HR: 100 BPM
STRESS PERCENT HR: 109 %
STRESS POST ESTIMATED WORKLOAD: 4.6 METS
STRESS POST EXERCISE DUR MIN: 2 MIN
STRESS POST EXERCISE DUR SEC: 33 SEC
STRESS POST PEAK BP: NORMAL MMHG
STRESS POST PEAK HR: 151 BPM
STRESS TARGET HR: 139 BPM

## 2020-05-21 PROCEDURE — 93018 CV STRESS TEST I&R ONLY: CPT | Performed by: INTERNAL MEDICINE

## 2020-05-21 PROCEDURE — 78452 HT MUSCLE IMAGE SPECT MULT: CPT | Performed by: INTERNAL MEDICINE

## 2020-05-21 PROCEDURE — 0 TECHNETIUM SESTAMIBI: Performed by: INTERNAL MEDICINE

## 2020-05-21 PROCEDURE — 78452 HT MUSCLE IMAGE SPECT MULT: CPT

## 2020-05-21 PROCEDURE — 93016 CV STRESS TEST SUPVJ ONLY: CPT | Performed by: INTERNAL MEDICINE

## 2020-05-21 PROCEDURE — 93017 CV STRESS TEST TRACING ONLY: CPT

## 2020-05-21 PROCEDURE — A9500 TC99M SESTAMIBI: HCPCS | Performed by: INTERNAL MEDICINE

## 2020-05-21 RX ADMIN — TECHNETIUM TC 99M SESTAMIBI 1 DOSE: 1 INJECTION INTRAVENOUS at 08:28

## 2020-05-21 RX ADMIN — TECHNETIUM TC 99M SESTAMIBI 1 DOSE: 1 INJECTION INTRAVENOUS at 09:31

## 2020-05-21 NOTE — PROGRESS NOTES
Nolan Calderón MD Brown, Karen M, RN             Stress test with small infarct but no ischemia. Medical management.      Tried to call patient to discuss above. Message left to call me back at office

## 2020-05-22 ENCOUNTER — DOCUMENTATION (OUTPATIENT)
Dept: CARDIOLOGY | Facility: CLINIC | Age: 60
End: 2020-05-22

## 2020-05-22 NOTE — PROGRESS NOTES
Dixie Galan MA Brown, Karen M, RN   Phone Number: [phone]          Previous Messages      ----- Message -----   From: Adamaris Urban   Sent: 2020   9:31 AM CDT   To: Dixie Galan MA   Subject: call back                                         Amee longoria 64 wanted a call back @ [phone], she was returning your call. thxs      Tried to call patient at given number. Left message to call office.

## 2020-05-22 NOTE — PROGRESS NOTES
Stress test results discussed with patient. Per Dr Calderón old infarct, no active ischemia. Patient verbalized understanding

## 2020-05-28 ENCOUNTER — OFFICE VISIT (OUTPATIENT)
Dept: FAMILY MEDICINE CLINIC | Facility: CLINIC | Age: 60
End: 2020-05-28

## 2020-05-28 VITALS
HEIGHT: 65 IN | BODY MASS INDEX: 30.32 KG/M2 | OXYGEN SATURATION: 97 % | WEIGHT: 182 LBS | TEMPERATURE: 98.2 F | HEART RATE: 97 BPM | DIASTOLIC BLOOD PRESSURE: 82 MMHG | SYSTOLIC BLOOD PRESSURE: 136 MMHG | RESPIRATION RATE: 20 BRPM

## 2020-05-28 DIAGNOSIS — E03.9 ACQUIRED HYPOTHYROIDISM: Chronic | ICD-10-CM

## 2020-05-28 DIAGNOSIS — I25.10 CORONARY ARTERY DISEASE INVOLVING NATIVE CORONARY ARTERY OF NATIVE HEART WITHOUT ANGINA PECTORIS: Primary | ICD-10-CM

## 2020-05-28 DIAGNOSIS — I10 ESSENTIAL HYPERTENSION: Chronic | ICD-10-CM

## 2020-05-28 DIAGNOSIS — F33.41 RECURRENT MAJOR DEPRESSIVE DISORDER, IN PARTIAL REMISSION (HCC): Chronic | ICD-10-CM

## 2020-05-28 DIAGNOSIS — E78.2 MIXED HYPERLIPIDEMIA: Chronic | ICD-10-CM

## 2020-05-28 PROCEDURE — 99214 OFFICE O/P EST MOD 30 MIN: CPT | Performed by: NURSE PRACTITIONER

## 2020-05-28 NOTE — PROGRESS NOTES
Subjective   Amee Almonte is a 59 y.o. female.     Here today for baldo on her b/p and her hypothyroidism.  She has had a mi and recently had a stress test done.  She feels well and has no reports of chest pain or shortness of breath.  She needs no refills at this time.    Hypertension   This is a chronic problem. The current episode started more than 1 year ago. The problem is unchanged. The problem is controlled. Pertinent negatives include no anxiety, blurred vision, chest pain, headaches, malaise/fatigue, neck pain, orthopnea, palpitations, peripheral edema, PND, shortness of breath or sweats. There are no associated agents to hypertension. Risk factors for coronary artery disease include dyslipidemia, post-menopausal state and sedentary lifestyle. Past treatments include beta blockers. Current antihypertension treatment includes beta blockers. The current treatment provides significant improvement. There are no compliance problems.  Hypertensive end-organ damage includes CAD/MI. There is no history of angina, kidney disease, CVA, heart failure, left ventricular hypertrophy, PVD or retinopathy.   Depression   Visit Type: follow-up  Patient presents with the following symptoms: anhedonia and depressed mood.  Patient is not experiencing: chest pain, choking sensation, compulsions, confusion, decreased concentration, dizziness, dry mouth, excessive worry, fatigue, feelings of hopelessness, feelings of worthlessness, hypersomnia, hyperventilation, impotence, insomnia, irritability, malaise, memory impairment, muscle tension, nausea, nervousness/anxiety, obsessions, palpitations, panic, psychomotor agitation, psychomotor retardation, restlessness, shortness of breath, suicidal ideas, suicidal planning, thoughts of death, weight gain and weight loss.  Frequency of symptoms: occasionally   Severity: mild   Sleep quality: good  Compliance with medications:  %        Hypothyroidism   This is a chronic problem. The  current episode started more than 1 year ago. The problem occurs constantly. The problem has been unchanged. Pertinent negatives include no abdominal pain, anorexia, arthralgias, change in bowel habit, chest pain, chills, congestion, coughing, diaphoresis, fatigue, fever, headaches, joint swelling, myalgias, nausea, neck pain, numbness, rash, sore throat, swollen glands, urinary symptoms, vertigo, visual change, vomiting or weakness. Nothing aggravates the symptoms. Treatments tried: levothyroxine. The treatment provided significant relief.        The following portions of the patient's history were reviewed and updated as appropriate: allergies, current medications, past family history, past medical history, past social history, past surgical history and problem list.    Review of Systems   Constitutional: Negative.  Negative for chills, diaphoresis, fatigue, fever, irritability, malaise/fatigue, unexpected weight gain and unexpected weight loss.   HENT: Negative.  Negative for congestion, sore throat and swollen glands.    Eyes: Negative.  Negative for blurred vision.   Respiratory: Negative.  Negative for cough, choking and shortness of breath.    Cardiovascular: Negative.  Negative for chest pain, palpitations, orthopnea and PND.   Gastrointestinal: Negative.  Negative for abdominal pain, anorexia, change in bowel habit, nausea and vomiting.   Endocrine: Negative.    Genitourinary: Negative.  Negative for impotence.   Musculoskeletal: Negative.  Negative for arthralgias, joint swelling, myalgias and neck pain.   Skin: Negative.  Negative for rash.   Allergic/Immunologic: Negative.    Neurological: Negative for vertigo, weakness, numbness and confusion.   Hematological: Negative.    Psychiatric/Behavioral: Positive for depressed mood. Negative for decreased concentration and suicidal ideas. The patient is not nervous/anxious and does not have insomnia.        Objective   Physical Exam   Constitutional: She is  oriented to person, place, and time. She appears well-developed and well-nourished. No distress.   HENT:   Head: Normocephalic and atraumatic.   Mouth/Throat: No oropharyngeal exudate.   Eyes: Pupils are equal, round, and reactive to light.   Neck: Normal range of motion. Neck supple. No thyromegaly present.   Cardiovascular: Normal rate, regular rhythm and normal heart sounds. Exam reveals no friction rub.   No murmur heard.  Pulmonary/Chest: Effort normal and breath sounds normal. No respiratory distress. She has no wheezes. She has no rales.   Abdominal: Soft.   Musculoskeletal: Normal range of motion.   Neurological: She is alert and oriented to person, place, and time.   Skin: Skin is warm and dry.   Psychiatric: She has a normal mood and affect. Thought content normal.   Nursing note and vitals reviewed.        Assessment/Plan   Amee was seen today for hypertension, depression and hypothyroidism.    Diagnoses and all orders for this visit:    Coronary artery disease involving native coronary artery of native heart without angina pectoris    Essential hypertension    Mixed hyperlipidemia    Acquired hypothyroidism    Recurrent major depressive disorder, in partial remission (CMS/[facility])      bmi is noted to be 30.3 which is considered obese.  Diet and exercise info is provided.         Answers for HPI/ROS submitted by the patient on 5/28/2020   What is the primary reason for your visit?: Other  Please describe your symptoms.: NA - regular follow-up  Have you had these symptoms before?: No  How long have you been having these symptoms?: 1-4 days  Please list any medications you are currently taking for this condition.: NA  Please describe any probable cause for these symptoms. : NA

## 2020-05-28 NOTE — PATIENT INSTRUCTIONS
Calorie Counting for Weight Loss  Calories are units of energy. Your body needs a certain amount of calories from food to keep you going throughout the day. When you eat more calories than your body needs, your body stores the extra calories as fat. When you eat fewer calories than your body needs, your body burns fat to get the energy it needs.  Calorie counting means keeping track of how many calories you eat and drink each day. Calorie counting can be helpful if you need to lose weight. If you make sure to eat fewer calories than your body needs, you should lose weight. Ask your health care provider what a healthy weight is for you.  For calorie counting to work, you will need to eat the right number of calories in a day in order to lose a healthy amount of weight per week. A dietitian can help you determine how many calories you need in a day and will give you suggestions on how to reach your calorie goal.  · A healthy amount of weight to lose per week is usually 1-2 lb (0.5-0.9 kg). This usually means that your daily calorie intake should be reduced by 500-750 calories.  · Eating 1,200 - 1,500 calories per day can help most women lose weight.  · Eating 1,500 - 1,800 calories per day can help most men lose weight.  What is my plan?  My goal is to have __________ calories per day.  If I have this many calories per day, I should lose around __________ pounds per week.  What do I need to know about calorie counting?  In order to meet your daily calorie goal, you will need to:  · Find out how many calories are in each food you would like to eat. Try to do this before you eat.  · Decide how much of the food you plan to eat.  · Write down what you ate and how many calories it had. Doing this is called keeping a food log.  To successfully lose weight, it is important to balance calorie counting with a healthy lifestyle that includes regular activity. Aim for 150 minutes of moderate exercise (such as walking) or 75  minutes of vigorous exercise (such as running) each week.  Where do I find calorie information?    The number of calories in a food can be found on a Nutrition Facts label. If a food does not have a Nutrition Facts label, try to look up the calories online or ask your dietitian for help.  Remember that calories are listed per serving. If you choose to have more than one serving of a food, you will have to multiply the calories per serving by the amount of servings you plan to eat. For example, the label on a package of bread might say that a serving size is 1 slice and that there are 90 calories in a serving. If you eat 1 slice, you will have eaten 90 calories. If you eat 2 slices, you will have eaten 180 calories.  How do I keep a food log?  Immediately after each meal, record the following information in your food log:  · What you ate. Don't forget to include toppings, sauces, and other extras on the food.  · How much you ate. This can be measured in cups, ounces, or number of items.  · How many calories each food and drink had.  · The total number of calories in the meal.  Keep your food log near you, such as in a small notebook in your pocket, or use a mobile bala or website. Some programs will calculate calories for you and show you how many calories you have left for the day to meet your goal.  What are some calorie counting tips?    · Use your calories on foods and drinks that will fill you up and not leave you hungry:  ? Some examples of foods that fill you up are nuts and nut butters, vegetables, lean proteins, and high-fiber foods like whole grains. High-fiber foods are foods with more than 5 g fiber per serving.  ? Drinks such as sodas, specialty coffee drinks, alcohol, and juices have a lot of calories, yet do not fill you up.  · Eat nutritious foods and avoid empty calories. Empty calories are calories you get from foods or beverages that do not have many vitamins or protein, such as candy, sweets, and  "soda. It is better to have a nutritious high-calorie food (such as an avocado) than a food with few nutrients (such as a bag of chips).  · Know how many calories are in the foods you eat most often. This will help you calculate calorie counts faster.  · Pay attention to calories in drinks. Low-calorie drinks include water and unsweetened drinks.  · Pay attention to nutrition labels for \"low fat\" or \"fat free\" foods. These foods sometimes have the same amount of calories or more calories than the full fat versions. They also often have added sugar, starch, or salt, to make up for flavor that was removed with the fat.  · Find a way of tracking calories that works for you. Get creative. Try different apps or programs if writing down calories does not work for you.  What are some portion control tips?  · Know how many calories are in a serving. This will help you know how many servings of a certain food you can have.  · Use a measuring cup to measure serving sizes. You could also try weighing out portions on a kitchen scale. With time, you will be able to estimate serving sizes for some foods.  · Take some time to put servings of different foods on your favorite plates, bowls, and cups so you know what a serving looks like.  · Try not to eat straight from a bag or box. Doing this can lead to overeating. Put the amount you would like to eat in a cup or on a plate to make sure you are eating the right portion.  · Use smaller plates, glasses, and bowls to prevent overeating.  · Try not to multitask (for example, watch TV or use your computer) while eating. If it is time to eat, sit down at a table and enjoy your food. This will help you to know when you are full. It will also help you to be aware of what you are eating and how much you are eating.  What are tips for following this plan?  Reading food labels  · Check the calorie count compared to the serving size. The serving size may be smaller than what you are used to " "eating.  · Check the source of the calories. Make sure the food you are eating is high in vitamins and protein and low in saturated and trans fats.  Shopping  · Read nutrition labels while you shop. This will help you make healthy decisions before you decide to purchase your food.  · Make a grocery list and stick to it.  Cooking  · Try to cook your favorite foods in a healthier way. For example, try baking instead of frying.  · Use low-fat dairy products.  Meal planning  · Use more fruits and vegetables. Half of your plate should be fruits and vegetables.  · Include lean proteins like poultry and fish.  How do I count calories when eating out?  · Ask for smaller portion sizes.  · Consider sharing an entree and sides instead of getting your own entree.  · If you get your own entree, eat only half. Ask for a box at the beginning of your meal and put the rest of your entree in it so you are not tempted to eat it.  · If calories are listed on the menu, choose the lower calorie options.  · Choose dishes that include vegetables, fruits, whole grains, low-fat dairy products, and lean protein.  · Choose items that are boiled, broiled, grilled, or steamed. Stay away from items that are buttered, battered, fried, or served with cream sauce. Items labeled \"crispy\" are usually fried, unless stated otherwise.  · Choose water, low-fat milk, unsweetened iced tea, or other drinks without added sugar. If you want an alcoholic beverage, choose a lower calorie option such as a glass of wine or light beer.  · Ask for dressings, sauces, and syrups on the side. These are usually high in calories, so you should limit the amount you eat.  · If you want a salad, choose a garden salad and ask for grilled meats. Avoid extra toppings like leon, cheese, or fried items. Ask for the dressing on the side, or ask for olive oil and vinegar or lemon to use as dressing.  · Estimate how many servings of a food you are given. For example, a serving of " cooked rice is ½ cup or about the size of half a baseball. Knowing serving sizes will help you be aware of how much food you are eating at restaurants. The list below tells you how big or small some common portion sizes are based on everyday objects:  ? 1 oz--4 stacked dice.  ? 3 oz--1 deck of cards.  ? 1 tsp--1 die.  ? 1 Tbsp--½ a ping-pong ball.  ? 2 Tbsp--1 ping-pong ball.  ? ½ cup--½ baseball.  ? 1 cup--1 baseball.  Summary  · Calorie counting means keeping track of how many calories you eat and drink each day. If you eat fewer calories than your body needs, you should lose weight.  · A healthy amount of weight to lose per week is usually 1-2 lb (0.5-0.9 kg). This usually means reducing your daily calorie intake by 500-750 calories.  · The number of calories in a food can be found on a Nutrition Facts label. If a food does not have a Nutrition Facts label, try to look up the calories online or ask your dietitian for help.  · Use your calories on foods and drinks that will fill you up, and not on foods and drinks that will leave you hungry.  · Use smaller plates, glasses, and bowls to prevent overeating.  This information is not intended to replace advice given to you by your health care provider. Make sure you discuss any questions you have with your health care provider.  Document Released: 12/18/2006 Document Revised: 09/06/2019 Document Reviewed: 11/17/2017  [employer] Patient Education © 2020 [employer]      Exercising to Lose Weight  Exercise is structured, repetitive physical activity to improve fitness and health. Getting regular exercise is important for everyone. It is especially important if you are overweight. Being overweight increases your risk of heart disease, stroke, diabetes, high blood pressure, and several types of cancer. Reducing your calorie intake and exercising can help you lose weight.  Exercise is usually categorized as moderate or vigorous intensity. To lose weight, most people need  to do a certain amount of moderate-intensity or vigorous-intensity exercise each week.  Moderate-intensity exercise    Moderate-intensity exercise is any activity that gets you moving enough to burn at least three times more energy (calories) than if you were sitting.  Examples of moderate exercise include:  · Walking a mile in 15 minutes.  · Doing light yard work.  · Biking at an easy pace.  Most people should get at least 150 minutes (2 hours and 30 minutes) a week of moderate-intensity exercise to maintain their body weight.  Vigorous-intensity exercise  Vigorous-intensity exercise is any activity that gets you moving enough to burn at least six times more calories than if you were sitting. When you exercise at this intensity, you should be working hard enough that you are not able to carry on a conversation.  Examples of vigorous exercise include:  · Running.  · Playing a team sport, such as football, basketball, and soccer.  · Jumping rope.  Most people should get at least 75 minutes (1 hour and 15 minutes) a week of vigorous-intensity exercise to maintain their body weight.  How can exercise affect me?  When you exercise enough to burn more calories than you eat, you lose weight. Exercise also reduces body fat and builds muscle. The more muscle you have, the more calories you burn. Exercise also:  · Improves mood.  · Reduces stress and tension.  · Improves your overall fitness, flexibility, and endurance.  · Increases bone strength.  The amount of exercise you need to lose weight depends on:  · Your age.  · The type of exercise.  · Any health conditions you have.  · Your overall physical ability.  Talk to your health care provider about how much exercise you need and what types of activities are safe for you.  What actions can I take to lose weight?  Nutrition    · Make changes to your diet as told by your health care provider or diet and nutrition specialist (dietitian). This may include:  ? Eating fewer  calories.  ? Eating more protein.  ? Eating less unhealthy fats.  ? Eating a diet that includes fresh fruits and vegetables, whole grains, low-fat dairy products, and lean protein.  ? Avoiding foods with added fat, salt, and sugar.  · Drink plenty of water while you exercise to prevent dehydration or heat stroke.  Activity  · Choose an activity that you enjoy and set realistic goals. Your health care provider can help you make an exercise plan that works for you.  · Exercise at a moderate or vigorous intensity most days of the week.  ? The intensity of exercise may vary from person to person. You can tell how intense a workout is for you by paying attention to your breathing and heartbeat. Most people will notice their breathing and heartbeat get faster with more intense exercise.  · Do resistance training twice each week, such as:  ? Push-ups.  ? Sit-ups.  ? Lifting weights.  ? Using resistance bands.  · Getting short amounts of exercise can be just as helpful as long structured periods of exercise. If you have trouble finding time to exercise, try to include exercise in your daily routine.  ? Get up, stretch, and walk around every 30 minutes throughout the day.  ? Go for a walk during your lunch break.  ? Park your car farther away from your destination.  ? If you take public transportation, get off one stop early and walk the rest of the way.  ? Make phone calls while standing up and walking around.  ? Take the stairs instead of elevators or escalators.  · Wear comfortable clothes and shoes with good support.  · Do not exercise so much that you hurt yourself, feel dizzy, or get very short of breath.  Where to find more information  · [place] Department of Health and Human Services: www.hhs.gov  · Centers for Disease Control and Prevention (CDC): www.cdc.gov  Contact a health care provider:  · Before starting a new exercise program.  · If you have questions or concerns about your weight.  · If you have a medical  problem that keeps you from exercising.  Get help right away if you have any of the following while exercising:  · Injury.  · Dizziness.  · Difficulty breathing or shortness of breath that does not go away when you stop exercising.  · Chest pain.  · Rapid heartbeat.  Summary  · Being overweight increases your risk of heart disease, stroke, diabetes, high blood pressure, and several types of cancer.  · Losing weight happens when you burn more calories than you eat.  · Reducing the amount of calories you eat in addition to getting regular moderate or vigorous exercise each week helps you lose weight.  This information is not intended to replace advice given to you by your health care provider. Make sure you discuss any questions you have with your health care provider.  Document Released: 01/20/2012 Document Revised: 12/31/2018 Document Reviewed: 12/31/2018  [employer] Patient Education © 2020 [employer]

## 2020-06-05 ENCOUNTER — TELEMEDICINE (OUTPATIENT)
Dept: ENDOCRINOLOGY | Facility: CLINIC | Age: 60
End: 2020-06-05

## 2020-06-05 DIAGNOSIS — E04.1 SOLITARY THYROID NODULE: ICD-10-CM

## 2020-06-05 DIAGNOSIS — E03.9 ACQUIRED HYPOTHYROIDISM: Primary | ICD-10-CM

## 2020-06-05 DIAGNOSIS — E55.9 VITAMIN D DEFICIENCY: ICD-10-CM

## 2020-06-05 PROCEDURE — 99214 OFFICE O/P EST MOD 30 MIN: CPT | Performed by: NURSE PRACTITIONER

## 2020-06-05 NOTE — PROGRESS NOTES
Subjective    Amee Almonte is a 60 y.o. female. she is here today for follow-up.    History of Present Illness     You have chosen to receive care through a telehealth visit.  Do you consent to use a video/audio connection for your medical care today? Yes           TELEHEALTH VIDEO VISIT     This a video visit due to [facility] current guidelines for social distancing due to the COVID 19 pandemic       60 year old female presents for follow up      REASON -  hypothyroidism         Duration  - abnormal since March 2019     Started on levothyroxine by GYN 6 years ago      Timing - constant     Quality - now controlled      Severity - moderate      Context -  Routine lab work revealed hypothyroidism      Location/size - thyroid      Ultrasound dated Feb. 2020     Right lobe 4.8 x 1.8 x 1.7 cm      Left lobe 4.7 x 1.7 x 1.2 cm      Isthmus measures 3 mm     Several small nodules in the right lobe, the largest measuring 4 mm     Nodule in the isthmus measuring 8 mm     Quantity -      Lab Results   Component Value Date    TSH 1.250 03/12/2020                Symptoms - fatigue , hot flashes      Alleviating Factors - taking levothyroxine 125 mcg taking 1/2 tablet daily      Aggravating Factors - none       Evaluation history:  TSH   Date Value Ref Range Status   03/12/2020 1.250 0.270 - 4.200 uIU/mL Final     Free T4   Date Value Ref Range Status   03/12/2020 1.45 0.93 - 1.70 ng/dL Final     T3, Free   Date Value Ref Range Status   09/20/2019 2.95 2.00 - 4.40 pg/mL Final       Current medications:  Current Outpatient Medications   Medication Sig Dispense Refill   • BRILINTA 60 MG tablet tablet Take 1 tablet by mouth 2 (Two) Times a Day. 60 tablet 5   • buPROPion SR (WELLBUTRIN SR) 150 MG 12 hr tablet Take 1 tablet by mouth Daily. (Patient taking differently: Take 150 mg by mouth 2 (Two) Times a Day.) 60 tablet 5   • Cholecalciferol (VITAMIN D-3) 5000 UNIT/ML liquid Take 5,000 mg by mouth 3 (Three) Times a Week.     •  Cyanocobalamin (VITAMIN B-12) 5000 MCG lozenge Take 5,000 mcg by mouth 3 (Three) Times a Week.     • levothyroxine (SYNTHROID, LEVOTHROID) 125 MCG tablet Take 125 mcg by mouth Daily. Take 0.5 tablets in the morning     • lisinopril (PRINIVIL,ZESTRIL) 10 MG tablet Take 1 tablet by mouth Daily. 30 tablet 5   • loratadine (Claritin) 10 MG tablet Take 1 tablet by mouth Daily. 30 tablet 5   • metoprolol succinate XL (TOPROL-XL) 25 MG 24 hr tablet Take 1 tablet by mouth Daily With Dinner. 30 tablet 12   • Omega-3 Fatty Acids (OMEGA-3 FISH OIL PO) Take  by mouth.     • Potassium 99 MG tablet Take 99 mg by mouth 3 (Three) Times a Week.     • rosuvastatin (CRESTOR) 40 MG tablet Take 1 tablet by mouth Daily. 30 tablet 11     No current facility-administered medications for this visit.        The following portions of the patient's history were reviewed and updated as appropriate:   Past Medical History:   Diagnosis Date   • Acquired hypothyroidism 2018   • Heart attack (CMS/HCC)    • Hypertension    • Mixed hyperlipidemia 2018   • Overweight (BMI 25.0-29.9) 2018   • Recurrent major depressive disorder, in partial remission (CMS/HCC) 2018   • Smoker 2018     Past Surgical History:   Procedure Laterality Date   • CARDIAC CATHETERIZATION N/A 2018    Procedure: Left Heart Cath;  Surgeon: Bro Sotelo MD;  Location: [facility] INVASIVE LOCATION;  Service: Cardiology   • HYSTERECTOMY     • NH RT/LT HEART CATHETERS N/A 2018    Procedure: Percutaneous Coronary Intervention;  Surgeon: Bro Sotelo MD;  Location: [facility] INVASIVE LOCATION;  Service: Cardiology   • TUBAL ABDOMINAL LIGATION       Family History   Problem Relation Age of Onset   • Lung cancer Father    • Skin cancer Mother      OB History        1    Para   1    Term                AB        Living   1       SAB        TAB        Ectopic        Molar        Multiple        Live Births                   Allergies   Allergen Reactions   • Iodine Other (See Comments)   • Iodides Swelling and Rash     Social History     Socioeconomic History   • Marital status:      Spouse name: Not on file   • Number of children: Not on file   • Years of education: Not on file   • Highest education level: Not on file   Tobacco Use   • Smoking status: Former Smoker   • Smokeless tobacco: Never Used   Substance and Sexual Activity   • Alcohol use: Yes   • Drug use: No   • Sexual activity: Yes     Birth control/protection: None, Surgical     Comment: Hysterectomy       Review of Systems  Review of Systems   Constitutional: Negative for activity change, appetite change, diaphoresis and fatigue.   HENT: Negative for facial swelling, sneezing, sore throat, tinnitus, trouble swallowing and voice change.    Eyes: Negative for photophobia, pain, discharge, redness, itching and visual disturbance.   Respiratory: Negative for apnea, cough, choking, chest tightness and shortness of breath.    Cardiovascular: Negative for chest pain, palpitations and leg swelling.   Gastrointestinal: Negative for abdominal distention, abdominal pain, constipation, diarrhea, nausea and vomiting.   Endocrine: Negative for cold intolerance, heat intolerance, polydipsia, polyphagia and polyuria.   Genitourinary: Negative for difficulty urinating, dysuria, frequency, hematuria and urgency.   Musculoskeletal: Negative for arthralgias, back pain, gait problem, joint swelling, myalgias, neck pain and neck stiffness.   Skin: Negative for color change, pallor, rash and wound.   Neurological: Negative for dizziness, tremors, weakness, light-headedness, numbness and headaches.   Hematological: Negative for adenopathy. Does not bruise/bleed easily.   Psychiatric/Behavioral: Negative for behavioral problems, confusion and sleep disturbance.        Objective    There were no vitals taken for this visit.  Physical Exam   Constitutional: She is oriented to person,  place, and time. She appears well-developed and well-nourished. No distress.   HENT:   Head: Normocephalic and atraumatic.   Right Ear: External ear normal.   Left Ear: External ear normal.   Nose: Nose normal.   Mouth/Throat: Oropharynx is clear and moist.   Eyes: Pupils are equal, round, and reactive to light. Conjunctivae and EOM are normal.   Neck: Normal range of motion.   Pulmonary/Chest: Effort normal. No respiratory distress.   Neurological: She is alert and oriented to person, place, and time.   Skin: No pallor.       Lab Review  Lab Results   Component Value Date    TSH 1.250 03/12/2020     Lab Results   Component Value Date    FREET4 1.45 03/12/2020        Assessment/Plan      1. Acquired hypothyroidism    2. Vitamin D deficiency    3. Solitary thyroid nodule    . This diagnosis was discussed and reviewed with the patient including the advantages of drug therapy.     1. Orders placed during this encounter include:  Orders Placed This Encounter   Procedures   • Comprehensive Metabolic Panel   • TSH   • Vitamin D 25 Hydroxy   • Estradiol   • FSH & LH   • CBC & Differential     Order Specific Question:   Manual Differential     Answer:   No       Medications prescribed:  Outpatient Encounter Medications as of 6/5/2020   Medication Sig Dispense Refill   • BRILINTA 60 MG tablet tablet Take 1 tablet by mouth 2 (Two) Times a Day. 60 tablet 5   • buPROPion SR (WELLBUTRIN SR) 150 MG 12 hr tablet Take 1 tablet by mouth Daily. (Patient taking differently: Take 150 mg by mouth 2 (Two) Times a Day.) 60 tablet 5   • Cholecalciferol (VITAMIN D-3) 5000 UNIT/ML liquid Take 5,000 mg by mouth 3 (Three) Times a Week.     • Cyanocobalamin (VITAMIN B-12) 5000 MCG lozenge Take 5,000 mcg by mouth 3 (Three) Times a Week.     • levothyroxine (SYNTHROID, LEVOTHROID) 125 MCG tablet Take 125 mcg by mouth Daily. Take 0.5 tablets in the morning     • lisinopril (PRINIVIL,ZESTRIL) 10 MG tablet Take 1 tablet by mouth Daily. 30 tablet 5    • loratadine (Claritin) 10 MG tablet Take 1 tablet by mouth Daily. 30 tablet 5   • metoprolol succinate XL (TOPROL-XL) 25 MG 24 hr tablet Take 1 tablet by mouth Daily With Dinner. 30 tablet 12   • Omega-3 Fatty Acids (OMEGA-3 FISH OIL PO) Take  by mouth.     • Potassium 99 MG tablet Take 99 mg by mouth 3 (Three) Times a Week.     • rosuvastatin (CRESTOR) 40 MG tablet Take 1 tablet by mouth Daily. 30 tablet 11     No facility-administered encounter medications on file as of 6/5/2020.      Hypothyroidism         Component      Latest Ref Rng & Units 1/22/2020   TSH Baseline      0.270 - 4.200 uIU/mL 2.640   Free T4      0.93 - 1.70 ng/dL 1.33   T3, Total      80.0 - 200.0 ng/dl 105.0   Thyroid Stimulating Immunoglobulin      0.00 - 0.55 IU/L <0.10   Thyrotropin Receptor Antibody      0.00 - 1.75 IU/L <1.10   Thyroid Peroxidase Antibody      0 - 34 IU/mL 12         Medication must me taken on an empty stomach at least one hour before food, drink and other medications. Only take with water. If taking vitamins or antiacids needs to be 4 hours before or after.     We may need to change to brand name synthroid to due variability in levels     Taking levothyroxine 125 mcg daily --takes 1/2 tablet       Lab Results   Component Value Date    TSH 1.250 03/12/2020          Thyroid nodules      Ultrasound dated Feb. 2020     Right lobe 4.8 x 1.8 x 1.7 cm      Left lobe 4.7 x 1.7 x 1.2 cm      Isthmus measures 3 mm     Several small nodules in the right lobe, the largest measuring 4 mm     Nodule in the isthmus measuring 8 mm     She will bring disc to the office     Repeat in Feb. 2021         Weight management           There is no height or weight on file to calculate BMI.      Vitamin d def.     Reassess today           We spent 25 minutes on the video call    I reviewed the patients electronic chart, reviewed medications, reviewed past and current labs, and active problems      I provided advice regarding thyroid disease  management, refilled medications today, order future labs and made future appointment     Patient was advised to contact the office if there were any unanswered questions or any concerns        4. No follow-ups on file.

## 2020-10-21 DIAGNOSIS — I10 ESSENTIAL HYPERTENSION: Chronic | ICD-10-CM

## 2020-10-22 RX ORDER — LISINOPRIL 10 MG/1
10 TABLET ORAL DAILY
Qty: 30 TABLET | Refills: 4 | Status: SHIPPED | OUTPATIENT
Start: 2020-10-22 | End: 2021-03-22 | Stop reason: SDUPTHER

## 2020-10-29 RX ORDER — TICAGRELOR 60 MG/1
60 TABLET ORAL 2 TIMES DAILY
Qty: 60 TABLET | Refills: 2 | Status: SHIPPED | OUTPATIENT
Start: 2020-10-29 | End: 2021-03-22 | Stop reason: SDUPTHER

## 2020-11-30 ENCOUNTER — OFFICE VISIT (OUTPATIENT)
Dept: FAMILY MEDICINE CLINIC | Facility: CLINIC | Age: 60
End: 2020-11-30

## 2020-11-30 VITALS
TEMPERATURE: 95.3 F | DIASTOLIC BLOOD PRESSURE: 65 MMHG | SYSTOLIC BLOOD PRESSURE: 133 MMHG | RESPIRATION RATE: 20 BRPM | HEART RATE: 98 BPM | OXYGEN SATURATION: 99 % | HEIGHT: 65 IN | WEIGHT: 191 LBS | BODY MASS INDEX: 31.82 KG/M2

## 2020-11-30 DIAGNOSIS — I10 ESSENTIAL HYPERTENSION: Chronic | ICD-10-CM

## 2020-11-30 DIAGNOSIS — Z11.59 ENCOUNTER FOR HEPATITIS C SCREENING TEST FOR LOW RISK PATIENT: ICD-10-CM

## 2020-11-30 DIAGNOSIS — E03.9 ACQUIRED HYPOTHYROIDISM: Chronic | ICD-10-CM

## 2020-11-30 DIAGNOSIS — M25.552 LEFT HIP PAIN: Primary | ICD-10-CM

## 2020-11-30 DIAGNOSIS — Z12.31 ENCOUNTER FOR SCREENING MAMMOGRAM FOR MALIGNANT NEOPLASM OF BREAST: ICD-10-CM

## 2020-11-30 DIAGNOSIS — E78.2 MIXED HYPERLIPIDEMIA: Chronic | ICD-10-CM

## 2020-11-30 PROCEDURE — 96372 THER/PROPH/DIAG INJ SC/IM: CPT | Performed by: NURSE PRACTITIONER

## 2020-11-30 PROCEDURE — 99214 OFFICE O/P EST MOD 30 MIN: CPT | Performed by: NURSE PRACTITIONER

## 2020-11-30 RX ORDER — BETAMETHASONE SODIUM PHOSPHATE AND BETAMETHASONE ACETATE 3; 3 MG/ML; MG/ML
12 INJECTION, SUSPENSION INTRA-ARTICULAR; INTRALESIONAL; INTRAMUSCULAR; SOFT TISSUE EVERY 24 HOURS
Status: SHIPPED | OUTPATIENT
Start: 2020-11-30 | End: 2020-12-02

## 2020-11-30 RX ADMIN — BETAMETHASONE SODIUM PHOSPHATE AND BETAMETHASONE ACETATE 12 MG: 3; 3 INJECTION, SUSPENSION INTRA-ARTICULAR; INTRALESIONAL; INTRAMUSCULAR; SOFT TISSUE at 08:38

## 2020-12-01 NOTE — PROGRESS NOTES
Subjective   Amee Almonte is a 60 y.o. female.     Amee Almonte age 60 comes in today with chief complaint of exacerbation of left hip pain.  She has seen [facility] about her left hip.  They told her that she had arthritis.  She states that it has gotten worse since the weather has changed and become cold and wet.  She also has history of having had an MI she has hypertension hyperlipidemia that are controlled with medication.  She will need labs drawn today.    Hip Pain   The incident occurred more than 1 week ago. There was no injury mechanism. The pain is present in the left hip. The quality of the pain is described as aching. The pain is at a severity of 3/10. The pain is moderate. The pain has been fluctuating since onset. Associated symptoms include an inability to bear weight. Pertinent negatives include no loss of motion, loss of sensation, muscle weakness, numbness or tingling. The symptoms are aggravated by weight bearing. She has tried NSAIDs for the symptoms. The treatment provided mild relief.   Hypothyroidism  This is a chronic problem. The current episode started more than 1 year ago. The problem occurs constantly. The problem has been unchanged. Associated symptoms include arthralgias (left hip pain) and fatigue. Pertinent negatives include no abdominal pain, anorexia, change in bowel habit, chest pain, chills, congestion, coughing, diaphoresis, fever, headaches, joint swelling, myalgias, nausea, neck pain, numbness, rash, sore throat, swollen glands, urinary symptoms, vertigo, visual change, vomiting or weakness. The symptoms are aggravated by walking. She has tried NSAIDs for the symptoms. The treatment provided moderate relief.   Hypertension  This is a chronic problem. The current episode started more than 1 year ago. The problem is unchanged. The problem is controlled. Pertinent negatives include no anxiety, blurred vision, chest pain, headaches, malaise/fatigue, neck pain, orthopnea,  palpitations, peripheral edema, PND, shortness of breath or sweats. Agents associated with hypertension include thyroid hormones and NSAIDs. Risk factors for coronary artery disease include dyslipidemia, post-menopausal state and sedentary lifestyle. Past treatments include ACE inhibitors and beta blockers. Current antihypertension treatment includes ACE inhibitors and beta blockers. The current treatment provides significant improvement. There are no compliance problems.  Hypertensive end-organ damage includes CAD/MI. There is no history of angina, kidney disease, CVA, heart failure, left ventricular hypertrophy, PVD or retinopathy.        The following portions of the patient's history were reviewed and updated as appropriate: allergies, current medications, past family history, past medical history, past social history, past surgical history and problem list.    Review of Systems   Constitutional: Positive for fatigue. Negative for chills, diaphoresis, fever and malaise/fatigue.   HENT: Negative.  Negative for congestion, sore throat and swollen glands.    Eyes: Negative.  Negative for blurred vision.   Respiratory: Negative.  Negative for cough and shortness of breath.    Cardiovascular: Negative.  Negative for chest pain, palpitations, orthopnea and PND.   Gastrointestinal: Negative.  Negative for abdominal pain, anorexia, change in bowel habit, nausea and vomiting.   Endocrine: Negative.    Genitourinary: Negative.    Musculoskeletal: Positive for arthralgias (left hip pain). Negative for joint swelling, myalgias and neck pain.   Skin: Negative.  Negative for rash.   Allergic/Immunologic: Negative.    Neurological: Negative.  Negative for vertigo, tingling, weakness and numbness.   Hematological: Negative.    Psychiatric/Behavioral: Negative.        Objective   Physical Exam  Vitals signs and nursing note reviewed.   Constitutional:       General: She is not in acute distress.     Appearance: She is  well-developed.   HENT:      Head: Normocephalic and atraumatic.      Right Ear: External ear normal.      Left Ear: External ear normal.      Nose: Nose normal.      Mouth/Throat:      Pharynx: No oropharyngeal exudate.   Eyes:      Pupils: Pupils are equal, round, and reactive to light.   Neck:      Musculoskeletal: Normal range of motion and neck supple.      Thyroid: No thyromegaly.   Cardiovascular:      Rate and Rhythm: Normal rate and regular rhythm.      Heart sounds: Normal heart sounds. No murmur. No friction rub.   Pulmonary:      Effort: Pulmonary effort is normal. No respiratory distress.      Breath sounds: Normal breath sounds. No wheezing or rales.   Abdominal:      Palpations: Abdomen is soft.   Musculoskeletal: Normal range of motion.      Left hip: She exhibits tenderness and bony tenderness. She exhibits normal range of motion, normal strength and no swelling.   Skin:     General: Skin is warm and dry.   Neurological:      Mental Status: She is alert and oriented to person, place, and time.   Psychiatric:         Thought Content: Thought content normal.           Assessment/Plan   Diagnoses and all orders for this visit:    1. Left hip pain (Primary)  -     betamethasone acetate-betamethasone sodium phosphate (CELESTONE SOLUSPAN) injection 12 mg    2. Encounter for hepatitis C screening test for low risk patient  -     Hepatitis C antibody    3. Essential hypertension  -     Comprehensive metabolic panel    4. Mixed hyperlipidemia  -     Lipid Panel    5. Acquired hypothyroidism  -     TSH    6. Encounter for screening mammogram for malignant neoplasm of breast  -     Mammo Screening Bilateral With CAD; Future    MIs noted to be 31.8 which is considered obese.  Diet and exercise information will be provided this patient.

## 2020-12-01 NOTE — PATIENT INSTRUCTIONS
Calorie Counting for Weight Loss  Calories are units of energy. Your body needs a certain amount of calories from food to keep you going throughout the day. When you eat more calories than your body needs, your body stores the extra calories as fat. When you eat fewer calories than your body needs, your body burns fat to get the energy it needs.  Calorie counting means keeping track of how many calories you eat and drink each day. Calorie counting can be helpful if you need to lose weight. If you make sure to eat fewer calories than your body needs, you should lose weight. Ask your health care provider what a healthy weight is for you.  For calorie counting to work, you will need to eat the right number of calories in a day in order to lose a healthy amount of weight per week. A dietitian can help you determine how many calories you need in a day and will give you suggestions on how to reach your calorie goal.  · A healthy amount of weight to lose per week is usually 1-2 lb (0.5-0.9 kg). This usually means that your daily calorie intake should be reduced by 500-750 calories.  · Eating 1,200 - 1,500 calories per day can help most women lose weight.  · Eating 1,500 - 1,800 calories per day can help most men lose weight.  What is my plan?  My goal is to have __________ calories per day.  If I have this many calories per day, I should lose around __________ pounds per week.  What do I need to know about calorie counting?  In order to meet your daily calorie goal, you will need to:  · Find out how many calories are in each food you would like to eat. Try to do this before you eat.  · Decide how much of the food you plan to eat.  · Write down what you ate and how many calories it had. Doing this is called keeping a food log.  To successfully lose weight, it is important to balance calorie counting with a healthy lifestyle that includes regular activity. Aim for 150 minutes of moderate exercise (such as walking) or 75  minutes of vigorous exercise (such as running) each week.  Where do I find calorie information?    The number of calories in a food can be found on a Nutrition Facts label. If a food does not have a Nutrition Facts label, try to look up the calories online or ask your dietitian for help.  Remember that calories are listed per serving. If you choose to have more than one serving of a food, you will have to multiply the calories per serving by the amount of servings you plan to eat. For example, the label on a package of bread might say that a serving size is 1 slice and that there are 90 calories in a serving. If you eat 1 slice, you will have eaten 90 calories. If you eat 2 slices, you will have eaten 180 calories.  How do I keep a food log?  Immediately after each meal, record the following information in your food log:  · What you ate. Don't forget to include toppings, sauces, and other extras on the food.  · How much you ate. This can be measured in cups, ounces, or number of items.  · How many calories each food and drink had.  · The total number of calories in the meal.  Keep your food log near you, such as in a small notebook in your pocket, or use a mobile bala or website. Some programs will calculate calories for you and show you how many calories you have left for the day to meet your goal.  What are some calorie counting tips?    · Use your calories on foods and drinks that will fill you up and not leave you hungry:  ? Some examples of foods that fill you up are nuts and nut butters, vegetables, lean proteins, and high-fiber foods like whole grains. High-fiber foods are foods with more than 5 g fiber per serving.  ? Drinks such as sodas, specialty coffee drinks, alcohol, and juices have a lot of calories, yet do not fill you up.  · Eat nutritious foods and avoid empty calories. Empty calories are calories you get from foods or beverages that do not have many vitamins or protein, such as candy, sweets, and  "soda. It is better to have a nutritious high-calorie food (such as an avocado) than a food with few nutrients (such as a bag of chips).  · Know how many calories are in the foods you eat most often. This will help you calculate calorie counts faster.  · Pay attention to calories in drinks. Low-calorie drinks include water and unsweetened drinks.  · Pay attention to nutrition labels for \"low fat\" or \"fat free\" foods. These foods sometimes have the same amount of calories or more calories than the full fat versions. They also often have added sugar, starch, or salt, to make up for flavor that was removed with the fat.  · Find a way of tracking calories that works for you. Get creative. Try different apps or programs if writing down calories does not work for you.  What are some portion control tips?  · Know how many calories are in a serving. This will help you know how many servings of a certain food you can have.  · Use a measuring cup to measure serving sizes. You could also try weighing out portions on a kitchen scale. With time, you will be able to estimate serving sizes for some foods.  · Take some time to put servings of different foods on your favorite plates, bowls, and cups so you know what a serving looks like.  · Try not to eat straight from a bag or box. Doing this can lead to overeating. Put the amount you would like to eat in a cup or on a plate to make sure you are eating the right portion.  · Use smaller plates, glasses, and bowls to prevent overeating.  · Try not to multitask (for example, watch TV or use your computer) while eating. If it is time to eat, sit down at a table and enjoy your food. This will help you to know when you are full. It will also help you to be aware of what you are eating and how much you are eating.  What are tips for following this plan?  Reading food labels  · Check the calorie count compared to the serving size. The serving size may be smaller than what you are used to " "eating.  · Check the source of the calories. Make sure the food you are eating is high in vitamins and protein and low in saturated and trans fats.  Shopping  · Read nutrition labels while you shop. This will help you make healthy decisions before you decide to purchase your food.  · Make a grocery list and stick to it.  Cooking  · Try to cook your favorite foods in a healthier way. For example, try baking instead of frying.  · Use low-fat dairy products.  Meal planning  · Use more fruits and vegetables. Half of your plate should be fruits and vegetables.  · Include lean proteins like poultry and fish.  How do I count calories when eating out?  · Ask for smaller portion sizes.  · Consider sharing an entree and sides instead of getting your own entree.  · If you get your own entree, eat only half. Ask for a box at the beginning of your meal and put the rest of your entree in it so you are not tempted to eat it.  · If calories are listed on the menu, choose the lower calorie options.  · Choose dishes that include vegetables, fruits, whole grains, low-fat dairy products, and lean protein.  · Choose items that are boiled, broiled, grilled, or steamed. Stay away from items that are buttered, battered, fried, or served with cream sauce. Items labeled \"crispy\" are usually fried, unless stated otherwise.  · Choose water, low-fat milk, unsweetened iced tea, or other drinks without added sugar. If you want an alcoholic beverage, choose a lower calorie option such as a glass of wine or light beer.  · Ask for dressings, sauces, and syrups on the side. These are usually high in calories, so you should limit the amount you eat.  · If you want a salad, choose a garden salad and ask for grilled meats. Avoid extra toppings like leon, cheese, or fried items. Ask for the dressing on the side, or ask for olive oil and vinegar or lemon to use as dressing.  · Estimate how many servings of a food you are given. For example, a serving of " cooked rice is ½ cup or about the size of half a baseball. Knowing serving sizes will help you be aware of how much food you are eating at restaurants. The list below tells you how big or small some common portion sizes are based on everyday objects:  ? 1 oz--4 stacked dice.  ? 3 oz--1 deck of cards.  ? 1 tsp--1 die.  ? 1 Tbsp--½ a ping-pong ball.  ? 2 Tbsp--1 ping-pong ball.  ? ½ cup--½ baseball.  ? 1 cup--1 baseball.  Summary  · Calorie counting means keeping track of how many calories you eat and drink each day. If you eat fewer calories than your body needs, you should lose weight.  · A healthy amount of weight to lose per week is usually 1-2 lb (0.5-0.9 kg). This usually means reducing your daily calorie intake by 500-750 calories.  · The number of calories in a food can be found on a Nutrition Facts label. If a food does not have a Nutrition Facts label, try to look up the calories online or ask your dietitian for help.  · Use your calories on foods and drinks that will fill you up, and not on foods and drinks that will leave you hungry.  · Use smaller plates, glasses, and bowls to prevent overeating.  This information is not intended to replace advice given to you by your health care provider. Make sure you discuss any questions you have with your health care provider.  Document Revised: 09/06/2019 Document Reviewed: 11/17/2017  [email or web address] Patient Education © 2020 [employer]      Exercising to Lose Weight  Exercise is structured, repetitive physical activity to improve fitness and health. Getting regular exercise is important for everyone. It is especially important if you are overweight. Being overweight increases your risk of heart disease, stroke, diabetes, high blood pressure, and several types of cancer. Reducing your calorie intake and exercising can help you lose weight.  Exercise is usually categorized as moderate or vigorous intensity. To lose weight, most people need to do a certain amount of  moderate-intensity or vigorous-intensity exercise each week.  Moderate-intensity exercise    Moderate-intensity exercise is any activity that gets you moving enough to burn at least three times more energy (calories) than if you were sitting.  Examples of moderate exercise include:  · Walking a mile in 15 minutes.  · Doing light yard work.  · Biking at an easy pace.  Most people should get at least 150 minutes (2 hours and 30 minutes) a week of moderate-intensity exercise to maintain their body weight.  Vigorous-intensity exercise  Vigorous-intensity exercise is any activity that gets you moving enough to burn at least six times more calories than if you were sitting. When you exercise at this intensity, you should be working hard enough that you are not able to carry on a conversation.  Examples of vigorous exercise include:  · Running.  · Playing a team sport, such as football, basketball, and soccer.  · Jumping rope.  Most people should get at least 75 minutes (1 hour and 15 minutes) a week of vigorous-intensity exercise to maintain their body weight.  How can exercise affect me?  When you exercise enough to burn more calories than you eat, you lose weight. Exercise also reduces body fat and builds muscle. The more muscle you have, the more calories you burn. Exercise also:  · Improves mood.  · Reduces stress and tension.  · Improves your overall fitness, flexibility, and endurance.  · Increases bone strength.  The amount of exercise you need to lose weight depends on:  · Your age.  · The type of exercise.  · Any health conditions you have.  · Your overall physical ability.  Talk to your health care provider about how much exercise you need and what types of activities are safe for you.  What actions can I take to lose weight?  Nutrition    · Make changes to your diet as told by your health care provider or diet and nutrition specialist (dietitian). This may include:  ? Eating fewer calories.  ? Eating more  protein.  ? Eating less unhealthy fats.  ? Eating a diet that includes fresh fruits and vegetables, whole grains, low-fat dairy products, and lean protein.  ? Avoiding foods with added fat, salt, and sugar.  · Drink plenty of water while you exercise to prevent dehydration or heat stroke.  Activity  · Choose an activity that you enjoy and set realistic goals. Your health care provider can help you make an exercise plan that works for you.  · Exercise at a moderate or vigorous intensity most days of the week.  ? The intensity of exercise may vary from person to person. You can tell how intense a workout is for you by paying attention to your breathing and heartbeat. Most people will notice their breathing and heartbeat get faster with more intense exercise.  · Do resistance training twice each week, such as:  ? Push-ups.  ? Sit-ups.  ? Lifting weights.  ? Using resistance bands.  · Getting short amounts of exercise can be just as helpful as long structured periods of exercise. If you have trouble finding time to exercise, try to include exercise in your daily routine.  ? Get up, stretch, and walk around every 30 minutes throughout the day.  ? Go for a walk during your lunch break.  ? Park your car farther away from your destination.  ? If you take public transportation, get off one stop early and walk the rest of the way.  ? Make phone calls while standing up and walking around.  ? Take the stairs instead of elevators or escalators.  · Wear comfortable clothes and shoes with good support.  · Do not exercise so much that you hurt yourself, feel dizzy, or get very short of breath.  Where to find more information  · [place] Department of Health and Human Services: www.hhs.gov  · Centers for Disease Control and Prevention (CDC): www.cdc.gov  Contact a health care provider:  · Before starting a new exercise program.  · If you have questions or concerns about your weight.  · If you have a medical problem that keeps you from  exercising.  Get help right away if you have any of the following while exercising:  · Injury.  · Dizziness.  · Difficulty breathing or shortness of breath that does not go away when you stop exercising.  · Chest pain.  · Rapid heartbeat.  Summary  · Being overweight increases your risk of heart disease, stroke, diabetes, high blood pressure, and several types of cancer.  · Losing weight happens when you burn more calories than you eat.  · Reducing the amount of calories you eat in addition to getting regular moderate or vigorous exercise each week helps you lose weight.  This information is not intended to replace advice given to you by your health care provider. Make sure you discuss any questions you have with your health care provider.  Document Revised: 12/31/2018 Document Reviewed: 12/31/2018  [employer] Patient Education © 2020 [employer]

## 2020-12-02 ENCOUNTER — TELEPHONE (OUTPATIENT)
Dept: FAMILY MEDICINE CLINIC | Facility: CLINIC | Age: 60
End: 2020-12-02

## 2020-12-30 DIAGNOSIS — Z12.31 ENCOUNTER FOR SCREENING MAMMOGRAM FOR MALIGNANT NEOPLASM OF BREAST: ICD-10-CM

## 2021-01-07 ENCOUNTER — OFFICE VISIT (OUTPATIENT)
Dept: ENDOCRINOLOGY | Facility: CLINIC | Age: 61
End: 2021-01-07

## 2021-01-07 ENCOUNTER — LAB (OUTPATIENT)
Dept: LAB | Facility: HOSPITAL | Age: 61
End: 2021-01-07

## 2021-01-07 VITALS
OXYGEN SATURATION: 99 % | BODY MASS INDEX: 29.7 KG/M2 | SYSTOLIC BLOOD PRESSURE: 124 MMHG | HEART RATE: 89 BPM | WEIGHT: 189.2 LBS | DIASTOLIC BLOOD PRESSURE: 76 MMHG | HEIGHT: 67 IN

## 2021-01-07 DIAGNOSIS — E55.9 VITAMIN D DEFICIENCY: ICD-10-CM

## 2021-01-07 DIAGNOSIS — E03.9 ACQUIRED HYPOTHYROIDISM: Chronic | ICD-10-CM

## 2021-01-07 DIAGNOSIS — E04.1 SOLITARY THYROID NODULE: Primary | ICD-10-CM

## 2021-01-07 DIAGNOSIS — E66.3 OVERWEIGHT (BMI 25.0-29.9): Chronic | ICD-10-CM

## 2021-01-07 LAB
25(OH)D3 SERPL-MCNC: 47.5 NG/ML (ref 30–100)
ALBUMIN SERPL-MCNC: 4.8 G/DL (ref 3.5–5.2)
ALBUMIN/GLOB SERPL: 1.9 G/DL
ALP SERPL-CCNC: 79 U/L (ref 39–117)
ALT SERPL W P-5'-P-CCNC: 30 U/L (ref 1–33)
ANION GAP SERPL CALCULATED.3IONS-SCNC: 10 MMOL/L (ref 5–15)
AST SERPL-CCNC: 27 U/L (ref 1–32)
BASOPHILS # BLD AUTO: 0.09 10*3/MM3 (ref 0–0.2)
BASOPHILS NFR BLD AUTO: 0.9 % (ref 0–1.5)
BILIRUB SERPL-MCNC: 0.2 MG/DL (ref 0–1.2)
BUN SERPL-MCNC: 23 MG/DL (ref 6–20)
BUN/CREAT SERPL: 22.1 (ref 7–25)
CALCIUM SPEC-SCNC: 10.1 MG/DL (ref 8.6–10.5)
CHLORIDE SERPL-SCNC: 102 MMOL/L (ref 98–107)
CO2 SERPL-SCNC: 26 MMOL/L (ref 22–29)
CREAT SERPL-MCNC: 1.04 MG/DL (ref 0.57–1)
DEPRECATED RDW RBC AUTO: 42.7 FL (ref 37–54)
EOSINOPHIL # BLD AUTO: 0.18 10*3/MM3 (ref 0–0.4)
EOSINOPHIL NFR BLD AUTO: 1.7 % (ref 0.3–6.2)
ERYTHROCYTE [DISTWIDTH] IN BLOOD BY AUTOMATED COUNT: 12.5 % (ref 12.3–15.4)
ESTRADIOL SERPL HS-MCNC: <5 PG/ML
FSH SERPL-ACNC: 74.9 MIU/ML
GFR SERPL CREATININE-BSD FRML MDRD: 54 ML/MIN/1.73
GLOBULIN UR ELPH-MCNC: 2.5 GM/DL
GLUCOSE SERPL-MCNC: 74 MG/DL (ref 65–99)
HCT VFR BLD AUTO: 39 % (ref 34–46.6)
HCV AB SER DONR QL: NORMAL
HGB BLD-MCNC: 13 G/DL (ref 12–15.9)
IMM GRANULOCYTES # BLD AUTO: 0.05 10*3/MM3 (ref 0–0.05)
IMM GRANULOCYTES NFR BLD AUTO: 0.5 % (ref 0–0.5)
LH SERPL-ACNC: 30.5 MIU/ML
LYMPHOCYTES # BLD AUTO: 3.26 10*3/MM3 (ref 0.7–3.1)
LYMPHOCYTES NFR BLD AUTO: 31.7 % (ref 19.6–45.3)
MCH RBC QN AUTO: 31.4 PG (ref 26.6–33)
MCHC RBC AUTO-ENTMCNC: 33.3 G/DL (ref 31.5–35.7)
MCV RBC AUTO: 94.2 FL (ref 79–97)
MONOCYTES # BLD AUTO: 0.61 10*3/MM3 (ref 0.1–0.9)
MONOCYTES NFR BLD AUTO: 5.9 % (ref 5–12)
NEUTROPHILS NFR BLD AUTO: 59.3 % (ref 42.7–76)
NEUTROPHILS NFR BLD AUTO: 6.11 10*3/MM3 (ref 1.7–7)
NRBC BLD AUTO-RTO: 0 /100 WBC (ref 0–0.2)
PLATELET # BLD AUTO: 432 10*3/MM3 (ref 140–450)
PMV BLD AUTO: 10.7 FL (ref 6–12)
POTASSIUM SERPL-SCNC: 5.3 MMOL/L (ref 3.5–5.2)
PROT SERPL-MCNC: 7.3 G/DL (ref 6–8.5)
RBC # BLD AUTO: 4.14 10*6/MM3 (ref 3.77–5.28)
SODIUM SERPL-SCNC: 138 MMOL/L (ref 136–145)
T4 FREE SERPL-MCNC: 1.31 NG/DL (ref 0.93–1.7)
TSH SERPL DL<=0.05 MIU/L-ACNC: 1.37 UIU/ML (ref 0.27–4.2)
WBC # BLD AUTO: 10.3 10*3/MM3 (ref 3.4–10.8)

## 2021-01-07 PROCEDURE — 86803 HEPATITIS C AB TEST: CPT | Performed by: NURSE PRACTITIONER

## 2021-01-07 PROCEDURE — 80053 COMPREHEN METABOLIC PANEL: CPT | Performed by: NURSE PRACTITIONER

## 2021-01-07 PROCEDURE — 84439 ASSAY OF FREE THYROXINE: CPT | Performed by: NURSE PRACTITIONER

## 2021-01-07 PROCEDURE — 83002 ASSAY OF GONADOTROPIN (LH): CPT | Performed by: NURSE PRACTITIONER

## 2021-01-07 PROCEDURE — 85025 COMPLETE CBC W/AUTO DIFF WBC: CPT | Performed by: NURSE PRACTITIONER

## 2021-01-07 PROCEDURE — 83001 ASSAY OF GONADOTROPIN (FSH): CPT | Performed by: NURSE PRACTITIONER

## 2021-01-07 PROCEDURE — 82306 VITAMIN D 25 HYDROXY: CPT | Performed by: NURSE PRACTITIONER

## 2021-01-07 PROCEDURE — 36415 COLL VENOUS BLD VENIPUNCTURE: CPT | Performed by: NURSE PRACTITIONER

## 2021-01-07 PROCEDURE — 82670 ASSAY OF TOTAL ESTRADIOL: CPT | Performed by: NURSE PRACTITIONER

## 2021-01-07 PROCEDURE — 99214 OFFICE O/P EST MOD 30 MIN: CPT | Performed by: NURSE PRACTITIONER

## 2021-01-07 PROCEDURE — 84443 ASSAY THYROID STIM HORMONE: CPT | Performed by: NURSE PRACTITIONER

## 2021-01-07 NOTE — PROGRESS NOTES
"Chief Complaint  Hypothyroidism    Subjective          Amee Almonte presents to [facility] for   History of Present Illness     Primary provider    DIXIE Cowart    In office visit    60-year-old female presents for follow-up    Reason hypothyroidism    Duration diagnosed in March 2019  Timing is constant    Quality is controlled    Severity is moderate    Context -  Routine lab work revealed hypothyroidism      Location/size - thyroid      Ultrasound dated Feb. 2020     Right lobe 4.8 x 1.8 x 1.7 cm      Left lobe 4.7 x 1.7 x 1.2 cm      Isthmus measures 3 mm     Several small nodules in the right lobe, the largest measuring 4 mm     Nodule in the isthmus measuring 8 mm     Quantity -      Lab Results   Component Value Date    TSH 1.250 03/12/2020          Symptoms - fatigue      Alleviating Factors - compliance with  levothyroxine 125 mcg taking 1/2 tablet daily      Aggravating Factors - none                 Objective   Vital Signs:   /76   Pulse 89   Ht 170.2 cm (67\")   Wt 85.8 kg (189 lb 3.2 oz)   SpO2 99%   BMI 29.63 kg/m²     Physical Exam  Constitutional:       Appearance: Normal appearance.   Neck:      Musculoskeletal: Normal range of motion.   Cardiovascular:      Rate and Rhythm: Normal rate and regular rhythm.      Pulses: Normal pulses.      Heart sounds: Normal heart sounds.   Pulmonary:      Effort: Pulmonary effort is normal.      Breath sounds: Normal breath sounds.   Musculoskeletal: Normal range of motion.   Skin:     Coloration: Skin is not pale.   Neurological:      General: No focal deficit present.      Mental Status: She is alert.   Psychiatric:         Mood and Affect: Mood normal.         Thought Content: Thought content normal.         Judgment: Judgment normal.        Result Review :   The following data was reviewed by: DIXIE Beltran on 01/07/2021:  TSH    TSH 1/22/20 3/12/20   TSH 2.640 1.250                     Assessment " and Plan    Problem List Items Addressed This Visit        Other    Acquired hypothyroidism (Chronic)    Relevant Orders    CBC & Differential    Comprehensive Metabolic Panel    TSH    Vitamin D 25 Hydroxy    T4, Free    Overweight (BMI 25.0-29.9) (Chronic)    Vitamin D deficiency    Relevant Orders    CBC & Differential    Comprehensive Metabolic Panel    TSH    Vitamin D 25 Hydroxy    T4, Free    Solitary thyroid nodule - Primary    Relevant Orders    US Thyroid    CBC & Differential    Comprehensive Metabolic Panel    TSH    Vitamin D 25 Hydroxy    T4, Free              Hypothyroidism              Medication must me taken on an empty stomach at least one hour before food, drink and other medications. Only take with water. If taking vitamins or antiacids needs to be 4 hours before or after.     We may need to change to brand name synthroid to due variability in levels     Taking levothyroxine 125 mcg daily --takes 1/2 tablet --keep this dosage               Lab Results   Component Value Date     TSH 1.250 03/12/2020            Thyroid nodules      Ultrasound dated Feb. 2020     Right lobe 4.8 x 1.8 x 1.7 cm      Left lobe 4.7 x 1.7 x 1.2 cm      Isthmus measures 3 mm     Several small nodules in the right lobe, the largest measuring 4 mm     Nodule in the isthmus measuring 8 mm     She will bring disc to the office      Repeat in Feb. 2021 --scheduled for next month         Weight management   Patient's Body mass index is 29.63 kg/m². BMI is above normal parameters. Recommendations include: nutrition counseling.    Decrease caloric intake by 500 per day       Bone health      Vitamin d def.      Taking vitamin d daily         Component      Latest Ref Rng & Units 9/9/2019   25 Hydroxy, Vitamin D      30.0 - 100.0 ng/ml 49.1            Follow Up   Return in about 6 months (around 7/7/2021) for Recheck.  Patient was given instructions and counseling regarding her condition or for health maintenance advice. Please  see specific information pulled into the AVS if appropriate.

## 2021-01-14 DIAGNOSIS — E04.1 SOLITARY THYROID NODULE: ICD-10-CM

## 2021-03-22 ENCOUNTER — OFFICE VISIT (OUTPATIENT)
Dept: CARDIOLOGY | Facility: CLINIC | Age: 61
End: 2021-03-22

## 2021-03-22 ENCOUNTER — LAB (OUTPATIENT)
Dept: LAB | Facility: HOSPITAL | Age: 61
End: 2021-03-22

## 2021-03-22 VITALS
HEART RATE: 89 BPM | SYSTOLIC BLOOD PRESSURE: 128 MMHG | DIASTOLIC BLOOD PRESSURE: 80 MMHG | HEIGHT: 67 IN | BODY MASS INDEX: 29.66 KG/M2 | OXYGEN SATURATION: 97 % | WEIGHT: 189 LBS

## 2021-03-22 DIAGNOSIS — E78.2 MIXED HYPERLIPIDEMIA: Chronic | ICD-10-CM

## 2021-03-22 DIAGNOSIS — I10 ESSENTIAL HYPERTENSION: ICD-10-CM

## 2021-03-22 DIAGNOSIS — I25.10 CORONARY ARTERY DISEASE INVOLVING NATIVE CORONARY ARTERY OF NATIVE HEART WITHOUT ANGINA PECTORIS: Primary | ICD-10-CM

## 2021-03-22 LAB
CHOLEST SERPL-MCNC: 148 MG/DL (ref 0–200)
HDLC SERPL-MCNC: 63 MG/DL (ref 40–60)
LDLC SERPL CALC-MCNC: 60 MG/DL (ref 0–100)
LDLC/HDLC SERPL: 0.89 {RATIO}
TRIGL SERPL-MCNC: 145 MG/DL (ref 0–150)
VLDLC SERPL-MCNC: 25 MG/DL (ref 5–40)

## 2021-03-22 PROCEDURE — 80061 LIPID PANEL: CPT | Performed by: NURSE PRACTITIONER

## 2021-03-22 PROCEDURE — 93000 ELECTROCARDIOGRAM COMPLETE: CPT | Performed by: INTERNAL MEDICINE

## 2021-03-22 PROCEDURE — 36415 COLL VENOUS BLD VENIPUNCTURE: CPT | Performed by: NURSE PRACTITIONER

## 2021-03-22 PROCEDURE — 99214 OFFICE O/P EST MOD 30 MIN: CPT | Performed by: NURSE PRACTITIONER

## 2021-03-22 RX ORDER — METOPROLOL SUCCINATE 25 MG/1
25 TABLET, EXTENDED RELEASE ORAL
Qty: 30 TABLET | Refills: 5 | Status: SHIPPED | OUTPATIENT
Start: 2021-03-22 | End: 2022-01-23 | Stop reason: SDUPTHER

## 2021-03-22 RX ORDER — ROSUVASTATIN CALCIUM 40 MG/1
40 TABLET, COATED ORAL DAILY
Qty: 30 TABLET | Refills: 5 | Status: SHIPPED | OUTPATIENT
Start: 2021-03-22 | End: 2021-06-14

## 2021-03-22 RX ORDER — LISINOPRIL 5 MG/1
5 TABLET ORAL DAILY
Qty: 30 TABLET | Refills: 5 | Status: SHIPPED | OUTPATIENT
Start: 2021-03-22 | End: 2021-09-27

## 2021-03-22 NOTE — PROGRESS NOTES
"Follow-up      Coronary Artery Disease  Presents for follow-up visit. Pertinent negatives include no chest pressure, chest tightness, dizziness, leg swelling, muscle weakness, palpitations, shortness of breath or weight gain. The symptoms have been stable. Compliance with diet is good. Compliance with exercise is good. Compliance with medications is good.        This is a 59 y.o. female with:     1. Coronary artery disease involving native coronary artery of native heart without angina pectoris    2. Mixed hyperlipidemia    3. Essential hypertension        Amee Almonte is a 60-year old female who normally follows with Dr. Calderón. I am seeing her today in his absence. She has a PMH of acute inferior wall MI had stent placement to mid right coronary artery in May 2018 with 3.0x23mm Xience stent by Dr. Crabtree, doing well , exercising 30-40 minutes daily.  Has been on Brilinta only 60mg after 12  Months. Stopped ASA due to excessive bruising.  Reported intermittent atypical chest pain, however no exertional chest pain on walking for 2 miles daily.  In evaluation of this she underwent stress testing which showed small infarct to inferior wall, negative for ischemia. She was recommended for medical management.     Since last visit she continues to report a vague ache/soreness on the left side of the chest which is intermittent.  She denies any worsening or relieving factors.  States she would have to have a random \"twinges of pain\".  She mentions this pain being present since 2018 when she had her stent placed.  She denies any other acute changes since last visit.          Past Medical History:   Diagnosis Date   • Acquired hypothyroidism 2/26/2018   • Heart attack (CMS/HCC)    • Hypertension    • Mixed hyperlipidemia 7/30/2018   • Overweight (BMI 25.0-29.9) 7/30/2018   • Recurrent major depressive disorder, in partial remission (CMS/HCC) 2/26/2018   • Smoker 2/26/2018     Past Surgical History:   Procedure Laterality Date "   • CARDIAC CATHETERIZATION N/A 5/13/2018    Procedure: Left Heart Cath;  Surgeon: Bro Sotelo MD;  Location: [facility] CATH INVASIVE LOCATION;  Service: Cardiology   • HYSTERECTOMY  2003   • [place] RT/LT HEART CATHETERS N/A 5/13/2018    Procedure: Percutaneous Coronary Intervention;  Surgeon: Bro Sotelo MD;  Location: [facility] INVASIVE LOCATION;  Service: Cardiology   • TUBAL ABDOMINAL LIGATION  1999     Social History     Socioeconomic History   • Marital status:      Spouse name: Not on file   • Number of children: Not on file   • Years of education: Not on file   • Highest education level: Not on file   Tobacco Use   • Smoking status: Former Smoker   • Smokeless tobacco: Never Used   Vaping Use   • Vaping Use: Never used   Substance and Sexual Activity   • Alcohol use: Yes   • Drug use: No   • Sexual activity: Yes     Birth control/protection: None, Surgical     Comment: Hysterectomy     Family History   Problem Relation Age of Onset   • Lung cancer Father    • Skin cancer Mother        ALLERGIES:  Allergies   Allergen Reactions   • Iodine Other (See Comments)   • Iodides Swelling and Rash         Review of Systems   Constitutional: Negative for chills, fever, malaise/fatigue and weight gain.   HENT: Negative for nosebleeds and tinnitus.    Eyes: Negative for blurred vision and double vision.   Cardiovascular: Negative for dyspnea on exertion, irregular heartbeat, leg swelling, palpitations and syncope.        As mentioned in HPI   Respiratory: Negative for chest tightness, cough, shortness of breath, sleep disturbances due to breathing and snoring.    Endocrine: Negative for polydipsia, polyphagia and polyuria.   Hematologic/Lymphatic: Negative for bleeding problem. Does not bruise/bleed easily.   Skin: Negative for color change and suspicious lesions.   Musculoskeletal: Negative for falls, muscle weakness and myalgias.   Gastrointestinal: Negative for bloating, heartburn and hematochezia.    Genitourinary: Negative for dysuria and hematuria.   Neurological: Negative for dizziness, headaches, seizures, vertigo and weakness.   Psychiatric/Behavioral: Negative for altered mental status and depression. The patient does not have insomnia and is not nervous/anxious.    Allergic/Immunologic: Negative for environmental allergies and persistent infections.       Current Outpatient Medications   Medication Sig Dispense Refill   • buPROPion SR (WELLBUTRIN SR) 150 MG 12 hr tablet Take 1 tablet by mouth Daily. 60 tablet 5   • Cholecalciferol (VITAMIN D-3) 5000 UNIT/ML liquid Take 5,000 mg by mouth 3 (Three) Times a Week.     • Cyanocobalamin (VITAMIN B-12) 5000 MCG lozenge Take 5,000 mcg by mouth 3 (Three) Times a Week.     • levothyroxine (SYNTHROID, LEVOTHROID) 125 MCG tablet Take 125 mcg by mouth Daily. Take 0.5 tablets in the morning     • lisinopril (PRINIVIL,ZESTRIL) 5 MG tablet Take 1 tablet by mouth Daily. 30 tablet 5   • loratadine (Claritin) 10 MG tablet Take 1 tablet by mouth Daily. 30 tablet 5   • metoprolol succinate XL (TOPROL-XL) 25 MG 24 hr tablet Take 1 tablet by mouth Daily With Dinner. 30 tablet 5   • Omega-3 Fatty Acids (OMEGA-3 FISH OIL PO) Take  by mouth.     • Potassium 99 MG tablet Take 99 mg by mouth 3 (Three) Times a Week.     • rosuvastatin (CRESTOR) 40 MG tablet Take 1 tablet by mouth Daily. 30 tablet 5   • ticagrelor (Brilinta) 60 MG tablet tablet Take 1 tablet by mouth 2 (Two) Times a Day. 60 tablet 5     No current facility-administered medications for this visit.       OBJECTIVE:    Physical Exam:   Vitals reviewed.   Constitutional:       General: Not in acute distress.     Appearance: Normal appearance. Well-developed. Not toxic-appearing or diaphoretic.   Eyes:      General: Lids are normal.      Conjunctiva/sclera: Conjunctivae normal.   HENT:      Head: Normocephalic and atraumatic.      Right Ear: External ear normal.      Left Ear: External ear normal.   Neck:      Vascular:  "No JVD.   Pulmonary:      Effort: Pulmonary effort is normal. No respiratory distress.      Breath sounds: Normal breath sounds. No decreased breath sounds. No wheezing. No rales.   Chest:      Chest wall: Not tender to palpatation.   Cardiovascular:      PMI at left midclavicular line. Normal rate. Regular rhythm. Normal S1 with normal intensity. Normal S2 with normal intensity.      Murmurs: There is no murmur.      No gallop. No S3 and S4 gallop. No click. No rub.   Pulses:     Intact distal pulses. No decreased pulses.   Edema:     Peripheral edema absent.   Abdominal:      General: Bowel sounds are normal. There is no distension.      Palpations: Abdomen is soft.      Tenderness: There is no abdominal tenderness.   Musculoskeletal:      Cervical back: Normal range of motion and neck supple. Skin:     General: Skin is warm and dry.      Coloration: Skin is not pale.      Findings: No erythema or rash.   Neurological:      Mental Status: Alert and oriented to person, place, and time.      Gait: Gait normal.   Psychiatric:         Behavior: Behavior normal.         Thought Content: Thought content normal.         Judgment: Judgment normal.       Vitals:    03/22/21 0806   BP: 128/80   BP Location: Right arm   Patient Position: Sitting   Cuff Size: Adult   Pulse: 89   SpO2: 97%   Weight: 85.7 kg (189 lb)   Height: 170.2 cm (67.01\")       DATA REVIEWED:   Results for orders placed during the hospital encounter of 05/13/18    Adult Transthoracic Echo Complete W/ Cont if Necessary Per Protocol    Interpretation Summary  · Mild tricuspid valve regurgitation is present.  · Left atrial cavity size is mildly dilated.  · Left ventricular systolic function is normal. Estimated EF = 60%.  · The following left ventricular wall segments are hypokinetic: apical inferior and apex hypokinetic.      No radiology results for the last 30 days.  CXR:     CT:     Labs: BMP, CBC, LIPID, TSH  Lab Results   Component Value Date    " GLUCOSE 74 01/07/2021    CALCIUM 10.1 01/07/2021     01/07/2021    K 5.3 (H) 01/07/2021    CO2 26.0 01/07/2021     01/07/2021    BUN 23 (H) 01/07/2021    CREATININE 1.04 (H) 01/07/2021    EGFRIFNONA 54 (L) 01/07/2021    BCR 22.1 01/07/2021    ANIONGAP 10.0 01/07/2021     Lab Results   Component Value Date    WBC 10.30 01/07/2021    HGB 13.0 01/07/2021    HCT 39.0 01/07/2021    MCV 94.2 01/07/2021     01/07/2021     Lab Results   Component Value Date    CHOL 148 03/22/2021    CHOL 193 09/09/2019    CHOL 154 03/11/2019     Lab Results   Component Value Date    TRIG 145 03/22/2021    TRIG 108 09/09/2019    TRIG 106 03/11/2019     Lab Results   Component Value Date    HDL 63 (H) 03/22/2021    HDL 65 (H) 09/09/2019    HDL 62 03/11/2019     No components found for: LDLCALC  Lab Results   Component Value Date    LDL 60 03/22/2021     (H) 09/09/2019    LDL 84 03/11/2019     No results found for: HDLLDLRATIO  No components found for: CHOLHDL  Lab Results   Component Value Date    TSH 1.370 01/07/2021     No results found for: PROBNP  EKG:             TTE:     Left Ventricle Left ventricular systolic function is normal. Estimated EF = 60%. Normal left ventricular cavity size and wall thickness noted. All left ventricular wall segments contract normally.   Right Ventricle Normal right ventricular cavity size and systolic function noted.   Left Atrium Left atrial cavity size is mildly dilated.   Right Atrium Right atrial cavity size is mildly dilated.   Aortic Valve The aortic valve is grossly normal in structure. The valve appears trileaflet. Trace aortic valve regurgitation is present.   Mitral Valve The mitral valve is abnormal in structure. There is mild bileaflet thickening present.  Trace-to-mild mitral valve regurgitation is present.   Tricuspid Valve The tricuspid valve is grossly normal.  Mild tricuspid valve regurgitation is present.   Pulmonic Valve The pulmonic valve was not well  visualized.   Pericardium There is no evidence of pericardial effusion.      Wall Scoring    Score Index: 2.000 Percent Normal: 0.0%             The following segments are hypokinetic: apical inferior and apex.  Other segments could not be evaluated.                     Stress tests:   · Findings consistent with an equivocal ECG stress test.  · GI artifact is present.  · (Calculated EF = 66%).  · Myocardial perfusion imaging indicates a small-sized infarct located in the inferior wall with no significant ischemia noted.  · Impressions are consistent with an intermediate risk study.    [facility]:    Findings:     Hemodynamics:  Ao -143/73 , LV- 136/16, LVEDP -25     Left ventriculography: LV gram showed an EF of 50-55%, inferoapical mild hypokinesis           Selective coronary angiography:   Dominance: Right dominant      Left Main coronary artery: Large caliber vessel divides into LAD and left circumflex     Left anterior descending artery: Large caliber vessel goes all the way to the apex free of significant disease giving rise to small diagonals and septals     Left circumflex:  Medium caliber vessel was free of significant disease     Right coronary artery:  Dominant vessel has 100% prior to the origin of the RV branch, distally gives rise to PDA and PLV branch and was getting collaterals from the left system              Percutaneous coronary intervention: After giving heparin bolus right guide was placed in the right coronary artery and a BMW wire was crossed through the lesion was predilated with a 2.0 x 20 and a 2.5 x 28 Trek balloon subsequently stented with a 3.0 X 23 Xience Alpine stent reducing 100% stenosis to 0% attaining  LIZZIE-3 flow from LIZZIE 0 flow.        Conclusion : Successful PCI to the mid RCA with a 3.0 X 23 Xience Alpine stent reducing 100% stenosis to 0% attaining LIZZIE-3 flow        Plan: Risk factor modification, start aspirin and Brilinta and statins                This document has been  electronically signed by Bro Sotelo MD on May 13, 2018 11:10 AM         The following portions of the patient's history were reviewed and updated as appropriate: allergies, current medications, past family history, past medical history, past social history, past surgical history and problem list.  Old records reviewed and pertinent information is included in the above objective data.     ASSESSMENT/PLAN:       Diagnosis Plan   1. Coronary artery disease involving native coronary artery of native heart without angina pectoris  ticagrelor (Brilinta) 60 MG tablet tablet    metoprolol succinate XL (TOPROL-XL) 25 MG 24 hr tablet    rosuvastatin (CRESTOR) 40 MG tablet   2. Mixed hyperlipidemia  rosuvastatin (CRESTOR) 40 MG tablet   3. Essential hypertension  ECG 12 Lead    lisinopril (PRINIVIL,ZESTRIL) 5 MG tablet    metoprolol succinate XL (TOPROL-XL) 25 MG 24 hr tablet         CAD/ Status post inferior wall MI with stent placement to mid right coronary artery with 3.0x23mm Xience stent  in May 2018.  From a cardiac standpoint she is stable with no evidence of angina, heart failure or cardiac decompensation.  -Continue Brilinita, BB and statin.   -EKG today NSR and negative for st-t changes.   -Stress testing which showed small infarct to inferior wall, negative for ischemia. She was recommended for medical management.     Hypertension -on Toprol-XL and lisinopril 5 mg daily     Hyper lipidemia on Crestor 40mg . Check lipid panel.      Hypothyroidism on Synthroid supplements followed by Dr. Veloz.        Patient's Body mass index is 29.59 kg/m². BMI is above normal parameters. Recommendations include: educational material, exercise counseling and nutrition counseling.          Amee Almonte is a former smoker     AAA Screening:   Not needed    Follow up: 6 months with Dr. Calderón.              This document has been electronically signed by DIXIE Johnson on March 22, 2021 09:38 CDT

## 2021-03-24 LAB
QT INTERVAL: 358 MS
QTC INTERVAL: 435 MS

## 2021-04-02 ENCOUNTER — TELEPHONE (OUTPATIENT)
Dept: FAMILY MEDICINE CLINIC | Facility: CLINIC | Age: 61
End: 2021-04-02

## 2021-04-15 ENCOUNTER — TELEPHONE (OUTPATIENT)
Dept: FAMILY MEDICINE CLINIC | Facility: CLINIC | Age: 61
End: 2021-04-15

## 2021-04-15 NOTE — TELEPHONE ENCOUNTER
Patient called and left voice message stating that she would like to make a new patient appointment.  No answer left detailed message

## 2021-04-21 RX ORDER — BUPROPION HYDROCHLORIDE 150 MG/1
150 TABLET, EXTENDED RELEASE ORAL DAILY
Qty: 60 TABLET | Refills: 4 | OUTPATIENT
Start: 2021-04-21

## 2021-06-14 DIAGNOSIS — E78.2 MIXED HYPERLIPIDEMIA: Chronic | ICD-10-CM

## 2021-06-14 DIAGNOSIS — I25.10 CORONARY ARTERY DISEASE INVOLVING NATIVE CORONARY ARTERY OF NATIVE HEART WITHOUT ANGINA PECTORIS: ICD-10-CM

## 2021-06-14 RX ORDER — ROSUVASTATIN CALCIUM 40 MG/1
TABLET, COATED ORAL
Qty: 30 TABLET | Refills: 10 | Status: SHIPPED | OUTPATIENT
Start: 2021-06-14 | End: 2022-01-06 | Stop reason: SDUPTHER

## 2021-06-21 ENCOUNTER — OFFICE VISIT (OUTPATIENT)
Dept: FAMILY MEDICINE CLINIC | Facility: CLINIC | Age: 61
End: 2021-06-21

## 2021-06-21 ENCOUNTER — LAB (OUTPATIENT)
Dept: LAB | Facility: HOSPITAL | Age: 61
End: 2021-06-21

## 2021-06-21 VITALS
SYSTOLIC BLOOD PRESSURE: 134 MMHG | RESPIRATION RATE: 20 BRPM | TEMPERATURE: 96.9 F | WEIGHT: 189.25 LBS | HEIGHT: 67 IN | BODY MASS INDEX: 29.7 KG/M2 | OXYGEN SATURATION: 99 % | DIASTOLIC BLOOD PRESSURE: 92 MMHG | HEART RATE: 82 BPM

## 2021-06-21 DIAGNOSIS — I10 ESSENTIAL HYPERTENSION: Chronic | ICD-10-CM

## 2021-06-21 DIAGNOSIS — E87.5 HYPERKALEMIA: ICD-10-CM

## 2021-06-21 DIAGNOSIS — E53.8 B12 DEFICIENCY: ICD-10-CM

## 2021-06-21 DIAGNOSIS — E03.9 ACQUIRED HYPOTHYROIDISM: Primary | Chronic | ICD-10-CM

## 2021-06-21 DIAGNOSIS — E78.2 MIXED HYPERLIPIDEMIA: Chronic | ICD-10-CM

## 2021-06-21 DIAGNOSIS — Z13.1 DIABETES MELLITUS SCREENING: ICD-10-CM

## 2021-06-21 DIAGNOSIS — F33.41 RECURRENT MAJOR DEPRESSIVE DISORDER, IN PARTIAL REMISSION (HCC): Chronic | ICD-10-CM

## 2021-06-21 LAB
ALBUMIN SERPL-MCNC: 5 G/DL (ref 3.5–5.2)
ALBUMIN/GLOB SERPL: 2.2 G/DL
ALP SERPL-CCNC: 96 U/L (ref 39–117)
ALT SERPL W P-5'-P-CCNC: 30 U/L (ref 1–33)
ANION GAP SERPL CALCULATED.3IONS-SCNC: 8.8 MMOL/L (ref 5–15)
AST SERPL-CCNC: 29 U/L (ref 1–32)
BILIRUB SERPL-MCNC: 0.4 MG/DL (ref 0–1.2)
BUN SERPL-MCNC: 12 MG/DL (ref 6–20)
BUN/CREAT SERPL: 13.3 (ref 7–25)
CALCIUM SPEC-SCNC: 9.6 MG/DL (ref 8.6–10.5)
CHLORIDE SERPL-SCNC: 100 MMOL/L (ref 98–107)
CO2 SERPL-SCNC: 26.2 MMOL/L (ref 22–29)
CREAT SERPL-MCNC: 0.9 MG/DL (ref 0.57–1)
GFR SERPL CREATININE-BSD FRML MDRD: 64 ML/MIN/1.73
GLOBULIN UR ELPH-MCNC: 2.3 GM/DL
GLUCOSE SERPL-MCNC: 91 MG/DL (ref 65–99)
HBA1C MFR BLD: 5.5 % (ref 4.8–5.6)
POTASSIUM SERPL-SCNC: 4.4 MMOL/L (ref 3.5–5.2)
PROT SERPL-MCNC: 7.3 G/DL (ref 6–8.5)
SODIUM SERPL-SCNC: 135 MMOL/L (ref 136–145)
VIT B12 BLD-MCNC: 1209 PG/ML (ref 211–946)

## 2021-06-21 PROCEDURE — 82607 VITAMIN B-12: CPT

## 2021-06-21 PROCEDURE — 99215 OFFICE O/P EST HI 40 MIN: CPT | Performed by: NURSE PRACTITIONER

## 2021-06-21 PROCEDURE — 80053 COMPREHEN METABOLIC PANEL: CPT

## 2021-06-21 PROCEDURE — 83036 HEMOGLOBIN GLYCOSYLATED A1C: CPT

## 2021-06-21 RX ORDER — MULTIPLE VITAMINS W/ MINERALS TAB 9MG-400MCG
1 TAB ORAL DAILY
COMMUNITY

## 2021-06-21 RX ORDER — BUPROPION HYDROCHLORIDE 150 MG/1
150 TABLET, EXTENDED RELEASE ORAL DAILY
Qty: 90 TABLET | Refills: 0 | Status: SHIPPED | OUTPATIENT
Start: 2021-06-21 | End: 2021-09-27

## 2021-06-21 RX ORDER — LISINOPRIL 10 MG/1
TABLET ORAL
COMMUNITY
Start: 2021-05-18 | End: 2021-06-21 | Stop reason: ALTCHOICE

## 2021-06-21 RX ORDER — LEVOTHYROXINE SODIUM 0.12 MG/1
TABLET ORAL
Qty: 90 TABLET | Refills: 0 | Status: SHIPPED | OUTPATIENT
Start: 2021-06-21 | End: 2021-11-09 | Stop reason: SDUPTHER

## 2021-06-21 NOTE — PROGRESS NOTES
"Chief Complaint  No chief complaint on file.    Subjective          Amee Almonte presents to [facility]    [facility]    PCP: Nicolas (retired)--appt to establish with DIXIE Thomson on 8-9-2021    CC: \"med refill\"    Patient needing refill of Levothyroxine and Buproprion SR.  All other meds she gets through cardiology.      Hx of MI in May 2018.  Has stent.  Sees  Cardiology every 6 months.  HTN/Hyperlipidemia managed by cardiology and does not need refills.  Next appt in Sept 2021.  BP minimally elevated today, but previous readings WNL.  Will continue to monitor.  Denies chest pain, dyspnea, dizziness, blurred vision or edema.     Hypothyroidism:  Rx given by previous PCP.  TSH WNL.  Will continue with current dose of 125 mcg (1/2 tab daily).  Is followed by endocrinology for thyroid nodules and has f/u next month.      Depression:  Reports she was initially started on Burproprion to help with smoking cessation and was suffering from depression following her MI in 2018.  Reports she has stopped smoking and is not sure that medication is still needed.  No longer feels depression or anxiety symptoms.  Would like to try and wean off medication.      Has had labs in the last 6 months and these are reviewed with patient today.  Slightly decreased renal function, will repeat CMP today.  Hasn't had recent B12, Vit D levels.  Hx of deficiency and taking oral supplements OTC.  Also taking potassium OTC, but had mild hyperkalemia on most recent CMP.      Screenings:    Hep C: Jan 2021--negative    Hx of total hysterectomy, no longer having Paps    Mammogram: 12/2020--negative    Colonoscopy: 2018 per Dr. Hayes--note in chart reviewed and recommended she repeat in 3 years--patient made aware and will schedule her own follow up    Feels well today without complaints.       Review of Systems   Constitutional: Negative.    HENT: Negative.    Eyes: Negative.    Respiratory: " "Negative.    Cardiovascular: Negative.    Gastrointestinal: Negative.    Genitourinary: Negative.    Musculoskeletal: Negative.    Skin: Negative.    Neurological: Negative.    Psychiatric/Behavioral: Negative.         Objective   Vital Signs:   /92 (BP Location: Right arm, Patient Position: Sitting, Cuff Size: Adult)   Pulse 82   Temp 96.9 °F (36.1 °C) (Temporal)   Resp 20   Ht 170.2 cm (67\")   Wt 85.8 kg (189 lb 4 oz)   SpO2 99%   BMI 29.64 kg/m²       Physical Exam  Vitals and nursing note reviewed.   Constitutional:       General: She is not in acute distress.     Appearance: Normal appearance. She is not ill-appearing.   HENT:      Head: Normocephalic and atraumatic.   Eyes:      General: No scleral icterus.        Right eye: No discharge.         Left eye: No discharge.      Conjunctiva/sclera: Conjunctivae normal.   Cardiovascular:      Rate and Rhythm: Normal rate and regular rhythm.      Comments: No peripheral edema    Pulmonary:      Effort: Pulmonary effort is normal. No respiratory distress.      Breath sounds: Normal breath sounds. No wheezing, rhonchi or rales.   Abdominal:      General: Bowel sounds are normal.      Palpations: Abdomen is soft.      Tenderness: There is no abdominal tenderness. There is no right CVA tenderness, left CVA tenderness, guarding or rebound.   Musculoskeletal:      Cervical back: Neck supple. No tenderness.   Lymphadenopathy:      Cervical: No cervical adenopathy.   Skin:     General: Skin is warm and dry.   Neurological:      General: No focal deficit present.      Mental Status: She is alert and oriented to person, place, and time.   Psychiatric:         Mood and Affect: Mood normal.         Thought Content: Thought content normal.          Result Review :     Common labs    Common Labsle 1/7/21 1/7/21 3/22/21    1009 1009    Glucose  74    BUN  23 (A)    Creatinine  1.04 (A)    eGFR Non African Am  54 (A)    Sodium  138    Potassium  5.3 (A)    Chloride  " 102    Calcium  10.1    Albumin  4.80    Total Bilirubin  0.2    Alkaline Phosphatase  79    AST (SGOT)  27    ALT (SGPT)  30    WBC 10.30     Hemoglobin 13.0     Hematocrit 39.0     Platelets 432     Total Cholesterol   148   Triglycerides   145   HDL Cholesterol   63 (A)   LDL Cholesterol    60   (A) Abnormal value            Component   Ref Range & Units 5 mo ago   TSH   0.270 - 4.200 uIU/mL 1.370    Resulting Agency [facility]         Specimen Collected: 01/07/21 10:09 Last Resulted: 01/07/21 22:16                    Assessment and Plan    Diagnoses and all orders for this visit:    1. Acquired hypothyroidism (Primary)  -     levothyroxine (SYNTHROID, LEVOTHROID) 125 MCG tablet; Take 0.5 tablets in the morning  Dispense: 90 tablet; Refill: 0    2. Recurrent major depressive disorder, in partial remission (CMS/HCC)  -     buPROPion SR (WELLBUTRIN SR) 150 MG 12 hr tablet; Take 1 tablet by mouth Daily.  Dispense: 90 tablet; Refill: 0    3. Diabetes mellitus screening  -     Hemoglobin A1c; Future    4. B12 deficiency  -     Vitamin B12; Future    5. Hyperkalemia  -     Comprehensive metabolic panel; Future    6. Essential hypertension    7. Mixed hyperlipidemia      Refills x 90 days of above medications.    Keep f/u with cardiology and endocrinology as scheduled  Will try to wean off burproprion SR and see how she does, will go to every other day dosing x 1 week, then every 3 days x 1 week and then can d/c.  If feels anxiety/depression symptoms developing, she will resume daily dosing.      Labs as above -- will call with results when available.      Return to [facility]  See PCP for routine f/u visit and management of chronic medical conditions--appt to establish with DIXIE Thomson in August      This document has been electronically signed by DIXIE Maldonado on June 21, 2021 17:52 CDT,.    I spent 45 minutes caring for Amee on this date of service. This time includes time spent by me in the  following activities:preparing for the visit, reviewing tests, performing a medically appropriate examination and/or evaluation , counseling and educating the patient/family/caregiver, ordering medications, tests, or procedures and documenting information in the medical record

## 2021-06-23 DIAGNOSIS — I10 ESSENTIAL HYPERTENSION: ICD-10-CM

## 2021-06-23 DIAGNOSIS — I25.10 CORONARY ARTERY DISEASE INVOLVING NATIVE CORONARY ARTERY OF NATIVE HEART WITHOUT ANGINA PECTORIS: ICD-10-CM

## 2021-06-23 RX ORDER — METOPROLOL SUCCINATE 25 MG/1
25 TABLET, EXTENDED RELEASE ORAL
Qty: 30 TABLET | Refills: 11 | OUTPATIENT
Start: 2021-06-23

## 2021-06-23 NOTE — PROGRESS NOTES
Called pt, left vm to return call. Pt also has active mychart. Will send message via [email or web address] if no return call.

## 2021-06-23 NOTE — TELEPHONE ENCOUNTER
RX WAS SENT ON 3/22/21 WITH 5 ADDITIONAL REFILLS PER DIXIE GUTIERREZ.  PT HAS NOT ESTABLISHED WITH DEBORAH YET.  APPT 8/9/21.

## 2021-08-06 ENCOUNTER — TELEPHONE (OUTPATIENT)
Dept: FAMILY MEDICINE CLINIC | Facility: CLINIC | Age: 61
End: 2021-08-06

## 2021-08-09 ENCOUNTER — OFFICE VISIT (OUTPATIENT)
Dept: FAMILY MEDICINE CLINIC | Facility: CLINIC | Age: 61
End: 2021-08-09

## 2021-08-09 VITALS
TEMPERATURE: 98.4 F | OXYGEN SATURATION: 98 % | WEIGHT: 192.6 LBS | DIASTOLIC BLOOD PRESSURE: 72 MMHG | SYSTOLIC BLOOD PRESSURE: 128 MMHG | BODY MASS INDEX: 30.23 KG/M2 | HEART RATE: 88 BPM | RESPIRATION RATE: 20 BRPM | HEIGHT: 67 IN

## 2021-08-09 DIAGNOSIS — E78.2 MIXED HYPERLIPIDEMIA: ICD-10-CM

## 2021-08-09 DIAGNOSIS — Z76.89 ENCOUNTER TO ESTABLISH CARE: Primary | ICD-10-CM

## 2021-08-09 DIAGNOSIS — R22.9 SINGLE SKIN NODULE: ICD-10-CM

## 2021-08-09 DIAGNOSIS — E03.9 ACQUIRED HYPOTHYROIDISM: ICD-10-CM

## 2021-08-09 DIAGNOSIS — R23.2 VASOMOTOR FLUSHING: ICD-10-CM

## 2021-08-09 DIAGNOSIS — I10 ESSENTIAL HYPERTENSION: ICD-10-CM

## 2021-08-09 PROBLEM — L57.8 SOLAR DEGENERATION: Status: ACTIVE | Noted: 2021-08-09

## 2021-08-09 PROBLEM — D22.9 MULTIPLE BENIGN MELANOCYTIC NEVI: Status: ACTIVE | Noted: 2021-08-09

## 2021-08-09 PROBLEM — C44.91 BASAL CELL CARCINOMA OF SKIN: Status: ACTIVE | Noted: 2021-08-09

## 2021-08-09 PROCEDURE — 99214 OFFICE O/P EST MOD 30 MIN: CPT | Performed by: NURSE PRACTITIONER

## 2021-08-09 RX ORDER — PAROXETINE 10 MG/1
10 TABLET, FILM COATED ORAL EVERY MORNING
Qty: 30 TABLET | Refills: 5 | Status: SHIPPED | OUTPATIENT
Start: 2021-08-09 | End: 2022-01-06 | Stop reason: SDUPTHER

## 2021-08-09 NOTE — PROGRESS NOTES
Chief Complaint  Epidermal Cyst and Hot Flashes    Subjective    History of Present Illness {CC  Problem List  Visit  Diagnosis   Encounters  Notes  Medications  Labs  Result Review Imaging  Media :23}     Amee Almonte presents to [facility] for   Establish care visit - not due annual exam until 12/1/21. Today has c/o skin nodule on right side x 6 mos and vasomotor hot flashes. Reports seeing Sylvia Cristina, yearly for skin assessment d/t hx of basal cell carcinoma. Reports full hysterectomy 2001 and took HRT [id number] - not currently taking anything. Reports chronic health issues: HTN, hyperlipidemia, hypothyroidism and vitamin D deficiency - sees cardiology for HTN and hyperlipid mgmt - reports MI on mothers day 2018 - 100% blockage of RMA; sees endo for thyroid mgmt - has f/t appts with both specialties on 9/27/21. Previous PCP, Ya Valenzuela NP - last visit 11/20/20 - last labs 6/21/21 ordered through walk in clinic by RUSLAN Corbin NP - A1c 5.5; Vit B12 1209; CMP normal; lipid with elevated HDL.        Objective     Physical Exam  Vitals and nursing note reviewed.   Constitutional:       General: She is not in acute distress.     Appearance: Normal appearance. She is normal weight.   HENT:      Head: Normocephalic and atraumatic.      Right Ear: Tympanic membrane, ear canal and external ear normal.      Left Ear: Tympanic membrane, ear canal and external ear normal.      Nose: Nose normal. No congestion or rhinorrhea.      Mouth/Throat:      Mouth: Mucous membranes are moist.      Pharynx: Oropharynx is clear.   Eyes:      Extraocular Movements: Extraocular movements intact.      Conjunctiva/sclera: Conjunctivae normal.      Pupils: Pupils are equal, round, and reactive to light.   Neck:      Vascular: No carotid bruit.   Cardiovascular:      Rate and Rhythm: Normal rate and regular rhythm.      Pulses: Normal pulses.      Heart sounds: Normal heart sounds. No murmur heard.      Pulmonary:      Effort: Pulmonary effort is normal.      Breath sounds: Normal breath sounds. No wheezing or rhonchi.   Abdominal:      General: Abdomen is flat. Bowel sounds are normal. There is no distension.      Palpations: Abdomen is soft.      Tenderness: There is no abdominal tenderness. There is no right CVA tenderness or left CVA tenderness.   Musculoskeletal:         General: No swelling, tenderness, deformity or signs of injury. Normal range of motion.      Cervical back: Normal range of motion and neck supple. No muscular tenderness.      Right lower leg: No edema.      Left lower leg: No edema.   Lymphadenopathy:      Cervical: No cervical adenopathy.   Skin:     General: Skin is warm and dry.      Capillary Refill: Capillary refill takes less than 2 seconds.      Coloration: Skin is not pale.      Findings: Lesion (nodule right lateral side/torso) present. No erythema.   Neurological:      General: No focal deficit present.      Mental Status: She is alert and oriented to person, place, and time.   Psychiatric:         Mood and Affect: Mood normal.         Behavior: Behavior normal.        Result Review  Data Reviewed:{ Labs  Result Review  Imaging  Med Tab  Media :23}   The following data was reviewed by (Optional):[id number]}  Common labs    Common Labsle 1/7/21 1/7/21 3/22/21 6/21/21 6/21/21    1009 1009  0819 0819   Glucose  74   91   BUN  23 (A)   12   Creatinine  1.04 (A)   0.90   eGFR Non  Am  54 (A)   64   Sodium  138   135 (A)   Potassium  5.3 (A)   4.4   Chloride  102   100   Calcium  10.1   9.6   Albumin  4.80   5.00   Total Bilirubin  0.2   0.4   Alkaline Phosphatase  79   96   AST (SGOT)  27   29   ALT (SGPT)  30   30   WBC 10.30       Hemoglobin 13.0       Hematocrit 39.0       Platelets 432       Total Cholesterol   148     Triglycerides   145     HDL Cholesterol   63 (A)     LDL Cholesterol    60     Hemoglobin A1C    5.50    (A) Abnormal value          No Images in the past  "120 days found..       Vital Signs:   /72 (BP Location: Left arm, Patient Position: Sitting, Cuff Size: Adult)   Pulse 88   Temp 98.4 °F (36.9 °C) (Temporal)   Resp 20   Ht 170.2 cm (67\")   Wt 87.4 kg (192 lb 9.6 oz)   SpO2 98%   BMI 30.17 kg/m²          Assessment and Plan {CC Problem List  Visit Diagnosis  ROS  Review (Popup)  Health Maintenance  Quality  BestPractice  Medications  SmartSets  SnapShot Encounters  Media :23}   Problem List Items Addressed This Visit        Cardiac and Vasculature    Hypertension (Chronic)    Mixed hyperlipidemia (Chronic)       Endocrine and Metabolic    Acquired hypothyroidism (Chronic)       Symptoms and Signs    Vasomotor flushing    Relevant Medications    PARoxetine (Paxil) 10 MG tablet    Other Relevant Orders    Ambulatory Referral to Gynecology      Other Visit Diagnoses     Encounter to establish care    -  Primary    Single skin nodule        Encounter for immunization             Diagnosis Plan   1. Encounter to establish care     2. Vasomotor flushing  PARoxetine (Paxil) 10 MG tablet    Ambulatory Referral to Gynecology   3. Single skin nodule     4. Encounter for immunization     5. Essential hypertension     6. Acquired hypothyroidism     7. Mixed hyperlipidemia       - Est care visit - last visit note and labs noted in HPI  - Vasomotor flushing - will refer to GYN for further eval and tx options - will start paroxetine 10mg nightly - RX submitted.   - Skin nodule - benign appear lipoma - advised to discuss with Dr. Guzman at yearly skin appt.  - HTN - controlled - cont lisinopril and metoprolol at current dosings - cont seeing cardiology for mgmt.  - Hypothyroid - cont levothyroxine at current dosing - cont seeing endocrinology for mgmt.  - Hyperlipidemia - continue rosuvastatin at current dosing - cont seeing cardiology as scheduled.   - RTC approx 4 mos for PE or PRN    Follow Up {Instructions Charge Capture  Follow-up Communications " :23}   Return in about 4 months (around 12/9/2021), or if symptoms worsen or fail to improve, for Annual physical.  Patient was given instructions and counseling regarding her condition or for health maintenance advice. Please see specific information pulled into the AVS if appropriate            .  This document has been electronically signed by DIXIE Luke on August 31, 2021 23:24 CDT

## 2021-08-31 PROBLEM — R94.6 ABNORMAL THYROID FUNCTION TEST: Status: RESOLVED | Noted: 2020-01-22 | Resolved: 2021-08-31

## 2021-08-31 PROBLEM — R23.2 VASOMOTOR FLUSHING: Status: ACTIVE | Noted: 2021-08-31

## 2021-09-27 ENCOUNTER — OFFICE VISIT (OUTPATIENT)
Dept: CARDIOLOGY | Facility: CLINIC | Age: 61
End: 2021-09-27

## 2021-09-27 ENCOUNTER — LAB (OUTPATIENT)
Dept: LAB | Facility: HOSPITAL | Age: 61
End: 2021-09-27

## 2021-09-27 ENCOUNTER — OFFICE VISIT (OUTPATIENT)
Dept: ENDOCRINOLOGY | Facility: CLINIC | Age: 61
End: 2021-09-27

## 2021-09-27 VITALS
DIASTOLIC BLOOD PRESSURE: 80 MMHG | HEART RATE: 91 BPM | OXYGEN SATURATION: 98 % | BODY MASS INDEX: 30.45 KG/M2 | TEMPERATURE: 97.3 F | HEIGHT: 67 IN | WEIGHT: 194 LBS | SYSTOLIC BLOOD PRESSURE: 156 MMHG

## 2021-09-27 VITALS
WEIGHT: 194.5 LBS | HEIGHT: 67 IN | BODY MASS INDEX: 30.53 KG/M2 | HEART RATE: 90 BPM | DIASTOLIC BLOOD PRESSURE: 68 MMHG | SYSTOLIC BLOOD PRESSURE: 124 MMHG | OXYGEN SATURATION: 99 %

## 2021-09-27 DIAGNOSIS — I10 ESSENTIAL HYPERTENSION: ICD-10-CM

## 2021-09-27 DIAGNOSIS — E55.9 VITAMIN D DEFICIENCY: ICD-10-CM

## 2021-09-27 DIAGNOSIS — E03.9 ACQUIRED HYPOTHYROIDISM: ICD-10-CM

## 2021-09-27 DIAGNOSIS — E04.1 SOLITARY THYROID NODULE: Primary | ICD-10-CM

## 2021-09-27 LAB
25(OH)D3 SERPL-MCNC: 44.7 NG/ML (ref 30–100)
ALBUMIN SERPL-MCNC: 4.7 G/DL (ref 3.5–5.2)
ALBUMIN/GLOB SERPL: 2.1 G/DL
ALP SERPL-CCNC: 74 U/L (ref 39–117)
ALT SERPL W P-5'-P-CCNC: 29 U/L (ref 1–33)
ANION GAP SERPL CALCULATED.3IONS-SCNC: 10.2 MMOL/L (ref 5–15)
AST SERPL-CCNC: 20 U/L (ref 1–32)
BASOPHILS # BLD AUTO: 0.06 10*3/MM3 (ref 0–0.2)
BASOPHILS NFR BLD AUTO: 0.7 % (ref 0–1.5)
BILIRUB SERPL-MCNC: 0.2 MG/DL (ref 0–1.2)
BUN SERPL-MCNC: 12 MG/DL (ref 6–20)
BUN/CREAT SERPL: 15.4 (ref 7–25)
CALCIUM SPEC-SCNC: 9.8 MG/DL (ref 8.6–10.5)
CHLORIDE SERPL-SCNC: 103 MMOL/L (ref 98–107)
CO2 SERPL-SCNC: 25.8 MMOL/L (ref 22–29)
CREAT SERPL-MCNC: 0.78 MG/DL (ref 0.57–1)
DEPRECATED RDW RBC AUTO: 41.4 FL (ref 37–54)
EOSINOPHIL # BLD AUTO: 0.17 10*3/MM3 (ref 0–0.4)
EOSINOPHIL NFR BLD AUTO: 2 % (ref 0.3–6.2)
ERYTHROCYTE [DISTWIDTH] IN BLOOD BY AUTOMATED COUNT: 12.2 % (ref 12.3–15.4)
GFR SERPL CREATININE-BSD FRML MDRD: 76 ML/MIN/1.73
GLOBULIN UR ELPH-MCNC: 2.2 GM/DL
GLUCOSE SERPL-MCNC: 85 MG/DL (ref 65–99)
HCT VFR BLD AUTO: 40 % (ref 34–46.6)
HGB BLD-MCNC: 13.3 G/DL (ref 12–15.9)
IMM GRANULOCYTES # BLD AUTO: 0.05 10*3/MM3 (ref 0–0.05)
IMM GRANULOCYTES NFR BLD AUTO: 0.6 % (ref 0–0.5)
LYMPHOCYTES # BLD AUTO: 2.47 10*3/MM3 (ref 0.7–3.1)
LYMPHOCYTES NFR BLD AUTO: 29.2 % (ref 19.6–45.3)
MCH RBC QN AUTO: 31 PG (ref 26.6–33)
MCHC RBC AUTO-ENTMCNC: 33.3 G/DL (ref 31.5–35.7)
MCV RBC AUTO: 93.2 FL (ref 79–97)
MONOCYTES # BLD AUTO: 0.61 10*3/MM3 (ref 0.1–0.9)
MONOCYTES NFR BLD AUTO: 7.2 % (ref 5–12)
NEUTROPHILS NFR BLD AUTO: 5.09 10*3/MM3 (ref 1.7–7)
NEUTROPHILS NFR BLD AUTO: 60.3 % (ref 42.7–76)
NRBC BLD AUTO-RTO: 0 /100 WBC (ref 0–0.2)
PLATELET # BLD AUTO: 376 10*3/MM3 (ref 140–450)
PMV BLD AUTO: 11 FL (ref 6–12)
POTASSIUM SERPL-SCNC: 4.6 MMOL/L (ref 3.5–5.2)
PROT SERPL-MCNC: 6.9 G/DL (ref 6–8.5)
RBC # BLD AUTO: 4.29 10*6/MM3 (ref 3.77–5.28)
SODIUM SERPL-SCNC: 139 MMOL/L (ref 136–145)
TSH SERPL DL<=0.05 MIU/L-ACNC: 1.1 UIU/ML (ref 0.27–4.2)
WBC # BLD AUTO: 8.45 10*3/MM3 (ref 3.4–10.8)

## 2021-09-27 PROCEDURE — 99214 OFFICE O/P EST MOD 30 MIN: CPT | Performed by: INTERNAL MEDICINE

## 2021-09-27 PROCEDURE — 84443 ASSAY THYROID STIM HORMONE: CPT | Performed by: NURSE PRACTITIONER

## 2021-09-27 PROCEDURE — 99214 OFFICE O/P EST MOD 30 MIN: CPT | Performed by: NURSE PRACTITIONER

## 2021-09-27 PROCEDURE — 36415 COLL VENOUS BLD VENIPUNCTURE: CPT | Performed by: NURSE PRACTITIONER

## 2021-09-27 PROCEDURE — 85025 COMPLETE CBC W/AUTO DIFF WBC: CPT | Performed by: NURSE PRACTITIONER

## 2021-09-27 PROCEDURE — 82306 VITAMIN D 25 HYDROXY: CPT | Performed by: NURSE PRACTITIONER

## 2021-09-27 PROCEDURE — 80053 COMPREHEN METABOLIC PANEL: CPT | Performed by: NURSE PRACTITIONER

## 2021-09-27 RX ORDER — LISINOPRIL 10 MG/1
10 TABLET ORAL DAILY
Qty: 90 TABLET | Refills: 3 | Status: SHIPPED | OUTPATIENT
Start: 2021-09-27 | End: 2022-05-01 | Stop reason: SDUPTHER

## 2021-09-27 NOTE — PROGRESS NOTES
"Chief Complaint  Hypothyroidism and Thyroid Problem    Subjective          Amee Almonte presents to [facility]  History of Present Illness     In office visit     Primary provider DIXIE Wang     61 year old female for follow up     Reason hypothyroidism    Timing constant     Quality controlled    Lab Results   Component Value Date    TSH 1.370 01/07/2021     Diagnosed in March 2019     Severity moderate        Location/size - thyroid      Ultrasound dated Feb. 2020     Right lobe 4.8 x 1.8 x 1.7 cm      Left lobe 4.7 x 1.7 x 1.2 cm      Isthmus measures 3 mm     Several small nodules in the right lobe, the largest measuring 4 mm     Nodule in the isthmus measuring 8 mm    Stable in Feb. 2021      Quantity -      Lab Results   Component Value Date    TSH 1.370 01/07/2021               Alleviating Factors - compliance with  levothyroxine 125 mcg taking 1/2 tablet daily      Aggravating Factors - none      Review of Systems - General ROS: negative                 Objective   Vital Signs:   /68   Pulse 90   Ht 170.2 cm (67\")   Wt 88.2 kg (194 lb 8 oz)   SpO2 99%   BMI 30.46 kg/m²     Physical Exam  Constitutional:       Appearance: Normal appearance.   Cardiovascular:      Rate and Rhythm: Regular rhythm.      Heart sounds: Normal heart sounds.   Pulmonary:      Breath sounds: Normal breath sounds.   Musculoskeletal:      Cervical back: Normal range of motion.   Neurological:      Mental Status: She is alert.        Result Review :   The following data was reviewed by: DIXIE Beltran on 09/27/2021:  Common labs    Common Labsle 1/7/21 1/7/21 3/22/21 6/21/21 6/21/21    1009 1009  0819 0819   Glucose  74   91   BUN  23 (A)   12   Creatinine  1.04 (A)   0.90   eGFR Non  Am  54 (A)   64   Sodium  138   135 (A)   Potassium  5.3 (A)   4.4   Chloride  102   100   Calcium  10.1   9.6   Albumin  4.80   5.00   Total Bilirubin  0.2   0.4   Alkaline " Phosphatase  79   96   AST (SGOT)  27   29   ALT (SGPT)  30   30   WBC 10.30       Hemoglobin 13.0       Hematocrit 39.0       Platelets 432       Total Cholesterol   148     Triglycerides   145     HDL Cholesterol   63 (A)     LDL Cholesterol    60     Hemoglobin A1C    5.50    (A) Abnormal value                      Assessment and Plan    Diagnoses and all orders for this visit:    1. Solitary thyroid nodule (Primary)  -     US Thyroid; Future  -     TSH  -     CBC & Differential  -     Comprehensive Metabolic Panel  -     Vitamin D 25 Hydroxy    2. Acquired hypothyroidism  -     TSH  -     CBC & Differential  -     Comprehensive Metabolic Panel  -     Vitamin D 25 Hydroxy    3. Vitamin D deficiency  -     TSH  -     CBC & Differential  -     Comprehensive Metabolic Panel  -     Vitamin D 25 Hydroxy           Hypothyroidism               Medication must me taken on an empty stomach at least one hour before food, drink and other medications. Only take with water. If taking vitamins or antiacids needs to be 4 hours before or after.     We may need to change to brand name synthroid to due variability in levels     Taking levothyroxine 125 mcg daily --takes 1/2 tablet --keep this dosage         Lab Results   Component Value Date    TSH 1.370 01/07/2021             Thyroid nodules      Ultrasound dated Feb. 2020     Right lobe 4.8 x 1.8 x 1.7 cm      Left lobe 4.7 x 1.7 x 1.2 cm      Isthmus measures 3 mm     Several small nodules in the right lobe, the largest measuring 4 mm     Nodule in the isthmus measuring 8 mm     She will bring disc to the office      Repeat in Feb. 2021 -- stable exam         Weight management    Obesity     Patient's Body mass index is 30.46 kg/m². indicating that she is obese (BMI >30). Obesity-related health conditions include the following: none. Obesity is unchanged. BMI is is above average; no BMI management plan is appropriate. We discussed portion control and increasing  exercise..         Bone health      Vitamin d def.      Taking vitamin d daily                        Follow Up   No follow-ups on file.  Patient was given instructions and counseling regarding her condition or for health maintenance advice. Please see specific information pulled into the AVS if appropriate.         This document has been electronically signed by DIXIE Beltran on September 27, 2021 09:41 CDT.

## 2021-09-27 NOTE — PROGRESS NOTES
[facility]  OFFICE NOTE    Cardiovascular Medicine  Nolan Calderón M.D., RPVI         No referring provider defined for this encounter.    Thank you for asking me to see Amee Almonte for CAD.    History of Present Illness  This is a 61 y.o. female with:    1. Coronary artery disease involving native coronary artery of native heart without angina pectoris    2. Mixed hyperlipidemia    3. Essential hypertension          Chief complaint -Follow-up CAD        History of present Illness- 61 y.o.-year-old lady who works at the [employer] had acute inferior wall MI had stent placement to mid right coronary artery in May 2018 with 3.0x23mm Xience stent by Dr. Crabtree, doing well , exercising 30-40 minutes daily.    Has been on Brilinta only 60mg after 12  Months. Stopped ASA due to excessive bruising.   Reported intermittent atypical chest pain, however no exertional chest pain on walking for 2 miles daily.   Stopped smoking.   No other acute complaints    04/27/2020:  No acute issues since last visit.   Continues to have intermittent chest pain. Feels like ache/sore on the left side side of the chest, most likely the next day after she had exerted herself   Taking brilinta without any problems    09/27/2021:  No acute issues since last visit. Walks and exercises on daily basis without limitation from chest pain. Occasional left sided sharp pain, lasting for a few seconds, which is atypical    Review of Systems - ROS  Constitution: Negative for weakness, weight gain and weight loss.   HENT: Negative for congestion.    Eyes: Negative for blurred vision.   Cardiovascular: As mentioned above  Respiratory: Negative for cough and hemoptysis.    Endocrine: Negative for polydipsia and polyuria.   Hematologic/Lymphatic: Negative for bleeding problem. Does not bruise/bleed easily.   Skin: Negative for flushing.   Musculoskeletal: Negative for neck pain and stiffness.   Gastrointestinal: Negative  for abdominal pain, diarrhea, jaundice, melena, nausea and vomiting.   Genitourinary: Negative for dysuria and hematuria.   Neurological: Negative for dizziness, focal weakness and numbness.   Psychiatric/Behavioral: Negative for altered mental status and depression.          All other systems were reviewed and were negative.    family history includes Lung cancer in her father; Skin cancer in her mother.     reports that she has quit smoking. She has never used smokeless tobacco. She reports current alcohol use. She reports that she does not use drugs.    Allergies   Allergen Reactions   • Iodine Other (See Comments)   • Iodides Swelling and Rash         Current Outpatient Medications:   •  Cholecalciferol (VITAMIN D-3) 5000 UNIT/ML liquid, Take 5,000 mg by mouth 3 (Three) Times a Week., Disp: , Rfl:   •  Cyanocobalamin (VITAMIN B-12) 5000 MCG lozenge, Take 5,000 mcg by mouth 1 (One) Time Per Week., Disp: , Rfl:   •  levothyroxine (SYNTHROID, LEVOTHROID) 125 MCG tablet, Take 0.5 tablets in the morning, Disp: 90 tablet, Rfl: 0  •  lisinopril (PRINIVIL,ZESTRIL) 5 MG tablet, Take 1 tablet by mouth Daily., Disp: 30 tablet, Rfl: 5  •  loratadine (Claritin) 10 MG tablet, Take 1 tablet by mouth Daily. (Patient taking differently: Take 10 mg by mouth 2 (two) times a day.), Disp: 30 tablet, Rfl: 5  •  metoprolol succinate XL (TOPROL-XL) 25 MG 24 hr tablet, Take 1 tablet by mouth Daily With Dinner., Disp: 30 tablet, Rfl: 5  •  multivitamin with minerals tablet tablet, Take 1 tablet by mouth Daily., Disp: , Rfl:   •  Omega-3 Fatty Acids (OMEGA-3 FISH OIL PO), Take 1,000 mg by mouth Daily., Disp: , Rfl:   •  PARoxetine (Paxil) 10 MG tablet, Take 1 tablet by mouth Every Morning., Disp: 30 tablet, Rfl: 5  •  Potassium 99 MG tablet, Take 99 mg by mouth 3 (Three) Times a Week., Disp: , Rfl:   •  rosuvastatin (CRESTOR) 40 MG tablet, TAKE 1 TABLET BY MOUTH EVERY DAY, Disp: 30 tablet, Rfl: 10  •  ticagrelor (Brilinta) 60 MG tablet  "tablet, Take 1 tablet by mouth 2 (Two) Times a Day., Disp: 60 tablet, Rfl: 5    Physical Exam:  Vitals:    09/27/21 1311   BP: 156/80   BP Location: Left arm   Patient Position: Sitting   Cuff Size: Adult   Pulse: 91   Temp: 97.3 °F (36.3 °C)   SpO2: 98%   Weight: 88 kg (194 lb)   Height: 170.2 cm (67\")   PainSc: 0-No pain     Current Pain Level: none  Pulse Ox: Normal        on room air  General: alert, appears stated age and cooperative     Body Habitus: well-nourished    HEENT: Head: Normocephalic, no lesions, without obvious abnormality. No arcus senilis, xanthelasma or xanthomas.    Neuro: alert, oriented x3  Pulses: 2+ and symmetric  JVP: Volume/Pulsation:       Normal.  Normal waveforms.   Appropriate inspiratory decrease.  No Kussmaul's. No Tejal's.   Carotid Exam: no bruit normal pulsation bilaterally    Carotid Volume: normal.                     Respirations: no increased work of breathing            Chest:  Normal              Pulmonary:Normal       Precordium: Normal impulses. P2 is not palpable.  RV Heave: absent  LV Heave: absent  Hardin:  normal size and placement  Palpable S4: absent.  Heart rate: normal    Heart Rhythm: regular                                     Heart Sounds: S1: normal    S2: normal  S3: absent                               S4: absent  Opening Snap: absent            Pericardial Rub:  Absent: .                 Abdomen:   Appearance: normal .  Palpation: Soft, non-tender to palpation, bowel sounds positive in all four quadrants; no guarding or rebound tenderness  Extremity: no edema.   LE Skin: no rashes  LE Hair:  normal  LE Pulses: well perfused with normal pulses in the distal extremities  Pallor on elevation: Absent. Rubor on dependency: None         DATA REVIEWED:     EKG. I personally reviewed and interpreted the EKG.  NSR    ECG/EMG Results (all)     None        ---------------------------------------------------  TTE/DAVID:  Results for orders placed during the hospital " encounter of 05/13/18    Adult Transthoracic Echo Complete W/ Cont if Necessary Per Protocol    Interpretation Summary  · Mild tricuspid valve regurgitation is present.  · Left atrial cavity size is mildly dilated.  · Left ventricular systolic function is normal. Estimated EF = 60%.  · The following left ventricular wall segments are hypokinetic: apical inferior and apex hypokinetic.      --------------------------------------------------------------------------------------------------  LABS:     The CVD Risk score (Myesha et al., 2008) failed to calculate for the following reasons:    The patient has a prior MI, stroke, CHF, or peripheral vascular disease diagnosis         Lab Results   Component Value Date    GLUCOSE 91 06/21/2021    BUN 12 06/21/2021    CREATININE 0.90 06/21/2021    EGFRIFNONA 64 06/21/2021    BCR 13.3 06/21/2021    K 4.4 06/21/2021    CO2 26.2 06/21/2021    CALCIUM 9.6 06/21/2021    ALBUMIN 5.00 06/21/2021    AST 29 06/21/2021    ALT 30 06/21/2021     Lab Results   Component Value Date    WBC 10.30 01/07/2021    HGB 13.0 01/07/2021    HCT 39.0 01/07/2021    MCV 94.2 01/07/2021     01/07/2021     Lab Results   Component Value Date    CHOL 148 03/22/2021    TRIG 145 03/22/2021    HDL 63 (H) 03/22/2021    LDL 60 03/22/2021     Lab Results   Component Value Date    TSH 1.370 01/07/2021    M5NNHWY 108.0 03/12/2020    D6KHUDH 8.67 09/20/2019    THYROIDAB 12 01/22/2020     Lab Results   Component Value Date    TROPONINI 2.270 (C) 05/15/2018     Lab Results   Component Value Date    HGBA1C 5.50 06/21/2021     No results found for: DDIMER  Lab Results   Component Value Date    ALT 30 06/21/2021     Lab Results   Component Value Date    HGBA1C 5.50 06/21/2021     Lab Results   Component Value Date    CREATININE 0.90 06/21/2021     Lab Results   Component Value Date    IRON 62 09/09/2019    TIBC 328 09/09/2019     No results found for: INR, PROTIME    Assessment/Plan     Status post inferior  wall MI with stent placement to mid right coronary artery with 3.0x23mm Xience stent  in May 2018 on Brilinta 60mg now after 12 months of DAPT.  Continued to have intermittent chest pain, Nuc stress 04/2020 test showed small infarct inferior wall without ischemia,  atypical symptoms.     Hypertension -on Toprol-XL and lisinopril 10 mg daily     Hyper lipidemia increased crestor to 40mg, LDL down to 62     Hypothyroidism on Synthroid supplements followed by Dr. Veloz.     Prevention:  Patient's Body mass index is 30.38 kg/m². BMI is above normal parameters. Recommendations include: exercise counseling and nutrition counseling.      Amee Almonte is a former smoker    AAA Screening:   Not needed    6 months        This document has been electronically signed by Nolan Calderón MD on September 27, 2021 13:17 CDT

## 2021-09-29 ENCOUNTER — TELEPHONE (OUTPATIENT)
Dept: ENDOCRINOLOGY | Facility: CLINIC | Age: 61
End: 2021-09-29

## 2021-11-01 ENCOUNTER — OFFICE VISIT (OUTPATIENT)
Dept: OBSTETRICS AND GYNECOLOGY | Facility: CLINIC | Age: 61
End: 2021-11-01

## 2021-11-01 VITALS
DIASTOLIC BLOOD PRESSURE: 72 MMHG | HEIGHT: 67 IN | WEIGHT: 196 LBS | BODY MASS INDEX: 30.76 KG/M2 | SYSTOLIC BLOOD PRESSURE: 140 MMHG

## 2021-11-01 DIAGNOSIS — Z12.31 ENCOUNTER FOR SCREENING MAMMOGRAM FOR BREAST CANCER: ICD-10-CM

## 2021-11-01 DIAGNOSIS — Z01.419 ENCOUNTER FOR GYNECOLOGICAL EXAMINATION WITHOUT ABNORMAL FINDING: Primary | ICD-10-CM

## 2021-11-01 PROCEDURE — 99396 PREV VISIT EST AGE 40-64: CPT | Performed by: NURSE PRACTITIONER

## 2021-11-01 RX ORDER — LATANOPROST 50 UG/ML
1 SOLUTION/ DROPS OPHTHALMIC
COMMUNITY
Start: 2021-10-15

## 2021-11-01 RX ORDER — LISINOPRIL 5 MG/1
TABLET ORAL
COMMUNITY
Start: 2021-09-27 | End: 2021-11-01 | Stop reason: SDUPTHER

## 2021-11-01 NOTE — PROGRESS NOTES
Subjective   Amee Almonte is a 61 y.o. here for annual exam.     Patient states she sweats profusely with activity. No other concerns at this time.      Gynecologic Exam  The patient's pertinent negatives include no genital itching, genital lesions, genital odor, genital rash, missed menses, pelvic pain, vaginal bleeding or vaginal discharge. Pertinent negatives include no abdominal pain, constipation, diarrhea or dysuria. Her sexual activity is non-contributory to the current illness. She uses hysterectomy for contraception. She is postmenopausal. Her past medical history is significant for a gynecological surgery.       The following portions of the patient's history were reviewed and updated as appropriate: allergies, current medications, past family history, past medical history, past social history, past surgical history and problem list.    Review of Systems   Constitutional: Negative for activity change, appetite change, diaphoresis, fatigue, unexpected weight gain and unexpected weight loss.   Respiratory: Negative for chest tightness and shortness of breath.    Cardiovascular: Negative for chest pain and palpitations.   Gastrointestinal: Negative for abdominal distention, abdominal pain, constipation and diarrhea.   Genitourinary: Negative for amenorrhea, breast discharge, breast lump, breast pain, decreased libido, dyspareunia, dysuria, menstrual problem, missed menses, pelvic pain, vaginal bleeding, vaginal discharge and vaginal pain.   Musculoskeletal: Negative for myalgias.   Skin: Negative for color change, dry skin and skin lesions.   Neurological: Negative for light-headedness and headache.   Psychiatric/Behavioral: Negative for agitation, dysphoric mood, sleep disturbance, depressed mood and stress. The patient is not nervous/anxious.        Objective   Physical Exam  Vitals and nursing note reviewed.   Constitutional:       General: She is awake. She is not in acute distress.     Appearance: Normal  appearance. She is well-developed and well-groomed. She is not ill-appearing, toxic-appearing or diaphoretic.   Neck:      Thyroid: No thyromegaly.   Cardiovascular:      Rate and Rhythm: Normal rate and regular rhythm.      Heart sounds: Normal heart sounds.   Pulmonary:      Effort: Pulmonary effort is normal.      Breath sounds: Normal breath sounds.   Chest:   Breasts:      Leonel Score is 5. Breasts are symmetrical.      Right: Normal. No swelling, bleeding, inverted nipple, mass, nipple discharge, skin change, tenderness, axillary adenopathy or supraclavicular adenopathy.      Left: Normal. No swelling, bleeding, inverted nipple, mass, nipple discharge, skin change, tenderness, axillary adenopathy or supraclavicular adenopathy.        Comments: CBE  Abdominal:      General: Bowel sounds are normal. There is no distension.      Palpations: Abdomen is soft.      Tenderness: There is no abdominal tenderness.   Genitourinary:     General: Normal vulva.      Exam position: Lithotomy position.      Leonel stage (genital): 5.      Labia:         Right: No rash, tenderness, lesion or injury.         Left: No rash, tenderness, lesion or injury.       Urethra: No prolapse, urethral pain, urethral swelling or urethral lesion.      Vagina: Normal.      Comments: Uterus, cervix and adnexa absent. Well healed vaginal cuff. Pap not indicated.  Lymphadenopathy:      Upper Body:      Right upper body: No supraclavicular, axillary or pectoral adenopathy.      Left upper body: No supraclavicular, axillary or pectoral adenopathy.      Lower Body: No right inguinal adenopathy. No left inguinal adenopathy.   Skin:     General: Skin is warm and dry.   Neurological:      Mental Status: She is alert and oriented to person, place, and time.   Psychiatric:         Attention and Perception: Attention and perception normal.         Mood and Affect: Mood and affect normal.         Speech: Speech normal.         Behavior: Behavior normal.  Behavior is cooperative.           Assessment/Plan   Diagnoses and all orders for this visit:    1. Encounter for gynecological examination without abnormal finding (Primary)    2. Encounter for screening mammogram for breast cancer  -     Mammo Screening Digital Tomosynthesis Bilateral With CAD; Future    Reviewed recommendations for cervical cancer and breast cancer screening. RTC annually or PRN. Endocrinology has already addressed her sweating and has changed her medication recently. If not improving, or worsening, recommended that she follow up with endo for further evaluation. Pt v/u.

## 2021-11-09 DIAGNOSIS — E03.9 ACQUIRED HYPOTHYROIDISM: Chronic | ICD-10-CM

## 2021-11-09 DIAGNOSIS — I25.10 CORONARY ARTERY DISEASE INVOLVING NATIVE CORONARY ARTERY OF NATIVE HEART WITHOUT ANGINA PECTORIS: ICD-10-CM

## 2021-11-10 RX ORDER — LEVOTHYROXINE SODIUM 0.12 MG/1
TABLET ORAL
Qty: 90 TABLET | Refills: 0 | Status: SHIPPED | OUTPATIENT
Start: 2021-11-10 | End: 2022-05-01 | Stop reason: SDUPTHER

## 2021-12-08 ENCOUNTER — TELEPHONE (OUTPATIENT)
Dept: FAMILY MEDICINE CLINIC | Facility: CLINIC | Age: 61
End: 2021-12-08

## 2021-12-09 ENCOUNTER — OFFICE VISIT (OUTPATIENT)
Dept: FAMILY MEDICINE CLINIC | Facility: CLINIC | Age: 61
End: 2021-12-09

## 2021-12-09 VITALS
WEIGHT: 193 LBS | SYSTOLIC BLOOD PRESSURE: 110 MMHG | OXYGEN SATURATION: 98 % | TEMPERATURE: 97.7 F | DIASTOLIC BLOOD PRESSURE: 72 MMHG | HEIGHT: 67 IN | HEART RATE: 92 BPM | BODY MASS INDEX: 30.29 KG/M2

## 2021-12-09 DIAGNOSIS — E55.9 VITAMIN D DEFICIENCY: ICD-10-CM

## 2021-12-09 DIAGNOSIS — Z13.220 SCREENING, LIPID: ICD-10-CM

## 2021-12-09 DIAGNOSIS — M79.671 BILATERAL FOOT PAIN: ICD-10-CM

## 2021-12-09 DIAGNOSIS — Z00.00 ROUTINE GENERAL MEDICAL EXAMINATION AT A HEALTH CARE FACILITY: Primary | ICD-10-CM

## 2021-12-09 DIAGNOSIS — Z13.1 SCREENING FOR DIABETES MELLITUS: ICD-10-CM

## 2021-12-09 DIAGNOSIS — E66.9 OBESITY (BMI 30-39.9): ICD-10-CM

## 2021-12-09 DIAGNOSIS — M79.672 BILATERAL FOOT PAIN: ICD-10-CM

## 2021-12-09 DIAGNOSIS — I10 ESSENTIAL HYPERTENSION: ICD-10-CM

## 2021-12-09 DIAGNOSIS — M25.559 HIP PAIN: ICD-10-CM

## 2021-12-09 DIAGNOSIS — E03.9 ACQUIRED HYPOTHYROIDISM: ICD-10-CM

## 2021-12-09 PROCEDURE — 99396 PREV VISIT EST AGE 40-64: CPT | Performed by: NURSE PRACTITIONER

## 2021-12-09 RX ORDER — NAPROXEN 500 MG/1
500 TABLET ORAL 2 TIMES DAILY WITH MEALS
Qty: 60 TABLET | Refills: 3 | Status: SHIPPED | OUTPATIENT
Start: 2021-12-09 | End: 2022-11-14

## 2021-12-09 NOTE — PROGRESS NOTES
"Chief Complaint  Annual Exam    Subjective    History of Present Illness {CC  Problem List  Visit  Diagnosis   Encounters  Notes  Medications  Labs  Result Review Imaging  Media :23}     Amee Almonte presents to [facility] [place] for   Annual Exam and labs - chronic health issues include HTN, hypothyroid and sees cardiology and endocrinology for mgmt. BP appears well controlled - denies CP, SOA, edema.Also reports left hip and bilat feet pain with R>L that has been present for \"couple of months\" - reports wearing heels makes better and wearing flats makes worse. Reports in regards to hip pain - saw Dr Guadalupe, chiropractor, in [id number] who sent her to Dr. Hanna - Dr. Guadalupe performed dry needling - MRI was performed that showed OA with bone spurs - steroid injection given approx 1-1/2 years ago but did not help sx - feels now hip pain is worsening. Also states she was recently dx with glaucoma by Dr. Stallings and now on eye drops that are helping. Hx of MI May 2018.       Objective     Physical Exam  Vitals and nursing note reviewed.   Constitutional:       General: She is not in acute distress.     Appearance: Normal appearance.   HENT:      Head: Normocephalic and atraumatic.      Right Ear: Tympanic membrane, ear canal and external ear normal.      Left Ear: Tympanic membrane, ear canal and external ear normal.      Nose: Nose normal. No congestion or rhinorrhea.      Mouth/Throat:      Mouth: Mucous membranes are moist.      Pharynx: Oropharynx is clear.   Eyes:      Extraocular Movements: Extraocular movements intact.      Conjunctiva/sclera: Conjunctivae normal.      Pupils: Pupils are equal, round, and reactive to light.   Neck:      Vascular: No carotid bruit.   Cardiovascular:      Rate and Rhythm: Normal rate and regular rhythm.      Pulses: Normal pulses.      Heart sounds: Normal heart sounds. No murmur heard.      Pulmonary:      Effort: Pulmonary " effort is normal.      Breath sounds: Normal breath sounds. No wheezing or rhonchi.   Abdominal:      General: Abdomen is flat. Bowel sounds are normal. There is no distension.      Palpations: Abdomen is soft.      Tenderness: There is no abdominal tenderness. There is no right CVA tenderness or left CVA tenderness.   Musculoskeletal:         General: Tenderness (left hip and bilat feet) present. No swelling, deformity or signs of injury. Normal range of motion.      Cervical back: Normal range of motion and neck supple. No muscular tenderness.      Right lower leg: No edema.      Left lower leg: No edema.   Lymphadenopathy:      Cervical: No cervical adenopathy.   Skin:     General: Skin is warm and dry.      Capillary Refill: Capillary refill takes less than 2 seconds.      Coloration: Skin is not pale.      Findings: Lesion (nodule right lateral side/torso) present. No erythema.   Neurological:      General: No focal deficit present.      Mental Status: She is alert and oriented to person, place, and time.   Psychiatric:         Mood and Affect: Mood normal.         Behavior: Behavior normal.        Result Review  Data Reviewed:{ Labs  Result Review  Imaging  Med Tab  Media :23}   The following data was reviewed by (Optional):[id number]}  Common labs    Common Labsle 3/22/21 6/21/21 6/21/21 9/27/21 9/27/21     0819 0819 1013 1013   Glucose   91  85   BUN   12  12   Creatinine   0.90  0.78   eGFR Non African Am   64  76   Sodium   135 (A)  139   Potassium   4.4  4.6   Chloride   100  103   Calcium   9.6  9.8   Albumin   5.00  4.70   Total Bilirubin   0.4  0.2   Alkaline Phosphatase   96  74   AST (SGOT)   29  20   ALT (SGPT)   30  29   WBC    8.45    Hemoglobin    13.3    Hematocrit    40.0    Platelets    376    Total Cholesterol 148       Triglycerides 145       HDL Cholesterol 63 (A)       LDL Cholesterol  60       Hemoglobin A1C  5.50      (A) Abnormal value          XR foot 3+ vw bilateral    Result Date:  "12/10/2021  No acute osseous abnormality of the right or left foot. Prominent right plantar calcaneal spur can be sources of heel pain. Bilateral hallux valgus deformity with bunion. Electronically signed by:  Nader Mcintyre MD  12/10/2021 10:56 AM CST Workstation: [id number]    XR Hips Bilateral With or Without Pelvis 2 View    Result Date: 12/10/2021  CONCLUSION: Minimal degenerative change greater trochanter each hip. No joint space narrowing. Minimal degenerative change left iliac crest. Minimal degenerative changes lower lumbar spine. [id number] Electronically signed by:  Brent Walker MD  12/10/2021 9:40 AM CST Workstation: [id number]       Vital Signs:   /72 (BP Location: Left arm, Patient Position: Sitting, Cuff Size: Adult)   Pulse 92   Temp 97.7 °F (36.5 °C)   Ht 170.2 cm (67\")   Wt 87.5 kg (193 lb)   SpO2 98%   BMI 30.23 kg/m²          Assessment and Plan {CC Problem List  Visit Diagnosis  ROS  Review (Popup)  Health Maintenance  Quality  BestPractice  Medications  SmartSets  SnapShot Encounters  Media :23}   Problem List Items Addressed This Visit        Cardiac and Vasculature    Essential hypertension    Relevant Orders    Comprehensive metabolic panel    CBC No Differential    Urinalysis With Culture If Indicated - Urine, Clean Catch       Endocrine and Metabolic    Acquired hypothyroidism (Chronic)    Relevant Orders    TSH    Overweight (BMI 25.0-29.9) (Chronic)    Vitamin D deficiency    Relevant Orders    Vitamin D 25 hydroxy      Other Visit Diagnoses     Routine general medical examination at a health care facility    -  Primary    Hip pain        Relevant Medications    naproxen (Naprosyn) 500 MG tablet    Other Relevant Orders    XR Hips Bilateral With or Without Pelvis 2 View (Completed)    Bilateral foot pain        Relevant Medications    naproxen (Naprosyn) 500 MG tablet    Other Relevant Orders    XR foot 3+ vw bilateral (Completed)    Screening, lipid        " Relevant Orders    Lipid panel    Screening for diabetes mellitus        Relevant Orders    Hemoglobin A1c         Diagnosis Plan   1. Routine general medical examination at a health care facility     2. Essential hypertension  Comprehensive metabolic panel    CBC No Differential    Urinalysis With Culture If Indicated - Urine, Clean Catch   3. Acquired hypothyroidism  TSH   4. Hip pain  XR Hips Bilateral With or Without Pelvis 2 View    naproxen (Naprosyn) 500 MG tablet   5. Bilateral foot pain  XR foot 3+ vw bilateral    naproxen (Naprosyn) 500 MG tablet   6. Vitamin D deficiency  Vitamin D 25 hydroxy   7. Screening, lipid  Lipid panel   8. Screening for diabetes mellitus  Hemoglobin A1c   9. Obesity (BMI 30-39.9)       - Annual Exam performed  - HTN - controlled - continue lisinopril, metoprolol at current dosing - cont f/u with cardiology for mgmt  - Hypothyroid - stable based on last lab - cont levothyroxine at current dosing - cont f/u with endo for mgmt.  - Hip pain - xray ordered for further evaluation - Naproxen prescribed for pain control  - Bilat foot pain - xray bilat feet ordered for further evaluation - Naproxen prescribed for pain control.   - Screening labs ordered - will notify of results when avail.  - Body mass index is 30.23 kg/m². BMI is considered obese. Advise healthy diet and exercise for weight loss. Nutritional material provided.   - RTC 6 mos for f/u or PRN.    Follow Up {Instructions Charge Capture  Follow-up Communications :23}   Return if symptoms worsen or fail to improve.  Patient was given instructions and counseling regarding her condition or for health maintenance advice. Please see specific information pulled into the AVS if appropriate            .  This document has been electronically signed by DIXIE Luke on December 27, 2021 21:38 CST

## 2021-12-27 PROBLEM — I10 ESSENTIAL HYPERTENSION: Status: ACTIVE | Noted: 2018-02-26

## 2021-12-28 NOTE — PATIENT INSTRUCTIONS
Preventive Care 40-64 Years Old, Female  Preventive care refers to visits with your health care provider and lifestyle choices that can promote health and wellness. This includes:  · A yearly physical exam. This may also be called an annual well check.  · Regular dental visits and eye exams.  · Immunizations.  · Screening for certain conditions.  · Healthy lifestyle choices, such as eating a healthy diet, getting regular exercise, not using drugs or products that contain nicotine and tobacco, and limiting alcohol use.  What can I expect for my preventive care visit?  Physical exam  Your health care provider will check your:  · Height and weight. This may be used to calculate body mass index (BMI), which tells if you are at a healthy weight.  · Heart rate and blood pressure.  · Skin for abnormal spots.  Counseling  Your health care provider may ask you questions about your:  · Alcohol, tobacco, and drug use.  · Emotional well-being.  · Home and relationship well-being.  · Sexual activity.  · Eating habits.  · Work and work environment.  · Method of birth control.  · Menstrual cycle.  · Pregnancy history.  What immunizations do I need?    Influenza (flu) vaccine  · This is recommended every year.  Tetanus, diphtheria, and pertussis (Tdap) vaccine  · You may need a Td booster every 10 years.  Varicella (chickenpox) vaccine  · You may need this if you have not been vaccinated.  Zoster (shingles) vaccine  · You may need this after age 60.  Measles, mumps, and rubella (MMR) vaccine  · You may need at least one dose of MMR if you were born in 1957 or later. You may also need a second dose.  Pneumococcal conjugate (PCV13) vaccine  · You may need this if you have certain conditions and were not previously vaccinated.  Pneumococcal polysaccharide (PPSV23) vaccine  · You may need one or two doses if you smoke cigarettes or if you have certain conditions.  Meningococcal conjugate (MenACWY) vaccine  · You may need this if you  have certain conditions.  Hepatitis A vaccine  · You may need this if you have certain conditions or if you travel or work in places where you may be exposed to hepatitis A.  Hepatitis B vaccine  · You may need this if you have certain conditions or if you travel or work in places where you may be exposed to hepatitis B.  Haemophilus influenzae type b (Hib) vaccine  · You may need this if you have certain conditions.  Human papillomavirus (HPV) vaccine  · If recommended by your health care provider, you may need three doses over 6 months.  You may receive vaccines as individual doses or as more than one vaccine together in one shot (combination vaccines). Talk with your health care provider about the risks and benefits of combination vaccines.  What tests do I need?  Blood tests  · Lipid and cholesterol levels. These may be checked every 5 years, or more frequently if you are over 50 years old.  · Hepatitis C test.  · Hepatitis B test.  Screening  · Lung cancer screening. You may have this screening every year starting at age 55 if you have a 30-pack-year history of smoking and currently smoke or have quit within the past 15 years.  · Colorectal cancer screening. All adults should have this screening starting at age 50 and continuing until age 75. Your health care provider may recommend screening at age 45 if you are at increased risk. You will have tests every 1-10 years, depending on your results and the type of screening test.  · Diabetes screening. This is done by checking your blood sugar (glucose) after you have not eaten for a while (fasting). You may have this done every 1-3 years.  · Mammogram. This may be done every 1-2 years. Talk with your health care provider about when you should start having regular mammograms. This may depend on whether you have a family history of breast cancer.  · BRCA-related cancer screening. This may be done if you have a family history of breast, ovarian, tubal, or peritoneal  cancers.  · Pelvic exam and Pap test. This may be done every 3 years starting at age 21. Starting at age 30, this may be done every 5 years if you have a Pap test in combination with an HPV test.  Other tests  · Sexually transmitted disease (STD) testing.  · Bone density scan. This is done to screen for osteoporosis. You may have this scan if you are at high risk for osteoporosis.  Follow these instructions at home:  Eating and drinking  · Eat a diet that includes fresh fruits and vegetables, whole grains, lean protein, and low-fat dairy.  · Take vitamin and mineral supplements as recommended by your health care provider.  · Do not drink alcohol if:  ? Your health care provider tells you not to drink.  ? You are pregnant, may be pregnant, or are planning to become pregnant.  · If you drink alcohol:  ? Limit how much you have to 0-1 drink a day.  ? Be aware of how much alcohol is in your drink. In the [place] one drink equals one 12 oz bottle of beer (355 mL), one 5 oz glass of wine (148 mL), or one 1½ oz glass of hard liquor (44 mL).  Lifestyle  · Take daily care of your teeth and gums.  · Stay active. Exercise for at least 30 minutes on 5 or more days each week.  · Do not use any products that contain nicotine or tobacco, such as cigarettes, e-cigarettes, and chewing tobacco. If you need help quitting, ask your health care provider.  · If you are sexually active, practice safe sex. Use a condom or other form of birth control (contraception) in order to prevent pregnancy and STIs (sexually transmitted infections).  · If told by your health care provider, take low-dose aspirin daily starting at age 50.  What's next?  · Visit your health care provider once a year for a well check visit.  · Ask your health care provider how often you should have your eyes and teeth checked.  · Stay up to date on all vaccines.  This information is not intended to replace advice given to you by your health care provider. Make sure you  discuss any questions you have with your health care provider.  Document Revised: 08/29/2019 Document Reviewed: 08/29/2019  Elsevier Patient Education © 2020 [employer]  Healthy Eating  Following a healthy eating pattern may help you to achieve and maintain a healthy body weight, reduce the risk of chronic disease, and live a long and productive life. It is important to follow a healthy eating pattern at an appropriate calorie level for your body. Your nutritional needs should be met primarily through food by choosing a variety of nutrient-rich foods.  What are tips for following this plan?  Reading food labels  · Read labels and choose the following:  ? Reduced or low sodium.  ? Juices with 100% fruit juice.  ? Foods with low saturated fats and high polyunsaturated and monounsaturated fats.  ? Foods with whole grains, such as whole wheat, cracked wheat, brown rice, and wild rice.  ? Whole grains that are fortified with folic acid. This is recommended for women who are pregnant or who want to become pregnant.  · Read labels and avoid the following:  ? Foods with a lot of added sugars. These include foods that contain brown sugar, corn sweetener, corn syrup, dextrose, fructose, glucose, high-fructose corn syrup, honey, invert sugar, lactose, malt syrup, maltose, molasses, raw sugar, sucrose, trehalose, or turbinado sugar.  § Do not eat more than the following amounts of added sugar per day:  § 6 teaspoons (25 g) for women.  § 9 teaspoons (38 g) for men.  ? Foods that contain processed or refined starches and grains.  ? Refined grain products, such as white flour, degermed cornmeal, white bread, and white rice.  Shopping  · Choose nutrient-rich snacks, such as vegetables, whole fruits, and nuts. Avoid high-calorie and high-sugar snacks, such as potato chips, fruit snacks, and candy.  · Use oil-based dressings and spreads on foods instead of solid fats such as butter, stick margarine, or cream cheese.  · Limit  "pre-made sauces, mixes, and \"instant\" products such as flavored rice, instant noodles, and ready-made pasta.  · Try more plant-protein sources, such as tofu, tempeh, black beans, edamame, lentils, nuts, and seeds.  · Explore eating plans such as the Mediterranean diet or vegetarian diet.  Cooking  · Use oil to sauté or stir-brand foods instead of solid fats such as butter, stick margarine, or lard.  · Try baking, boiling, grilling, or broiling instead of frying.  · Remove the fatty part of meats before cooking.  · Steam vegetables in water or broth.  Meal planning    · At meals, imagine dividing your plate into fourths:  ? One-half of your plate is fruits and vegetables.  ? One-fourth of your plate is whole grains.  ? One-fourth of your plate is protein, especially lean meats, poultry, eggs, tofu, beans, or nuts.  · Include low-fat dairy as part of your daily diet.    Lifestyle  · Choose healthy options in all settings, including home, work, school, restaurants, or stores.  · Prepare your food safely:  ? Wash your hands after handling raw meats.  ? Keep food preparation surfaces clean by regularly washing with hot, soapy water.  ? Keep raw meats separate from ready-to-eat foods, such as fruits and vegetables.  ? , meat, poultry, and eggs to the recommended internal temperature.  ? Store foods at safe temperatures. In general:  § Keep cold foods at 40°F (4.4°C) or below.  § Keep hot foods at 140°F (60°C) or above.  § Keep your freezer at 0°F (-17.8°C) or below.  § Foods are no longer safe to eat when they have been between the temperatures of 40°-140°F (4.4-60°C) for more than 2 hours.  What foods should I eat?  Fruits  Aim to eat 2 cup-equivalents of fresh, canned (in natural juice), or frozen fruits each day. Examples of 1 cup-equivalent of fruit include 1 small apple, 8 large strawberries, 1 cup canned fruit, ½ cup dried fruit, or 1 cup 100% juice.  Vegetables  Aim to eat 2½-3 cup-equivalents of fresh " and frozen vegetables each day, including different varieties and colors. Examples of 1 cup-equivalent of vegetables include 2 medium carrots, 2 cups raw, leafy greens, 1 cup chopped vegetable (raw or cooked), or 1 medium baked potato.  Grains  Aim to eat 6 ounce-equivalents of whole grains each day. Examples of 1 ounce-equivalent of grains include 1 slice of bread, 1 cup ready-to-eat cereal, 3 cups popcorn, or ½ cup cooked rice, pasta, or cereal.  Meats and other proteins  Aim to eat 5-6 ounce-equivalents of protein each day. Examples of 1 ounce-equivalent of protein include 1 egg, 1/2 cup nuts or seeds, or 1 tablespoon (16 g) peanut butter. A cut of meat or fish that is the size of a deck of cards is about 3-4 ounce-equivalents.  · Of the protein you eat each week, try to have at least 8 ounces come from seafood. This includes salmon, trout, herring, and anchovies.  Dairy  Aim to eat 3 cup-equivalents of fat-free or low-fat dairy each day. Examples of 1 cup-equivalent of dairy include 1 cup (240 mL) milk, 8 ounces (250 g) yogurt, 1½ ounces (44 g) natural cheese, or 1 cup (240 mL) fortified soy milk.  Fats and oils  · Aim for about 5 teaspoons (21 g) per day. Choose monounsaturated fats, such as canola and olive oils, avocados, peanut butter, and most nuts, or polyunsaturated fats, such as sunflower, corn, and soybean oils, walnuts, pine nuts, sesame seeds, sunflower seeds, and flaxseed.  Beverages  · Aim for six 8-oz glasses of water per day. Limit coffee to three to five 8-oz cups per day.  · Limit caffeinated beverages that have added calories, such as soda and energy drinks.  · Limit alcohol intake to no more than 1 drink a day for nonpregnant women and 2 drinks a day for men. One drink equals 12 oz of beer (355 mL), 5 oz of wine (148 mL), or 1½ oz of hard liquor (44 mL).  Seasoning and other foods  · Avoid adding excess amounts of salt to your foods. Try flavoring foods with herbs and spices instead of  salt.  · Avoid adding sugar to foods.  · Try using oil-based dressings, sauces, and spreads instead of solid fats.  This information is based on general [place] nutrition guidelines. For more information, visit choosemyplate.gov. Exact amounts may vary based on your nutrition needs.  Summary  · A healthy eating plan may help you to maintain a healthy weight, reduce the risk of chronic diseases, and stay active throughout your life.  · Plan your meals. Make sure you eat the right portions of a variety of nutrient-rich foods.  · Try baking, boiling, grilling, or broiling instead of frying.  · Choose healthy options in all settings, including home, work, school, restaurants, or stores.  This information is not intended to replace advice given to you by your health care provider. Make sure you discuss any questions you have with your health care provider.  Document Revised: 04/01/2019 Document Reviewed: 04/01/2019  Elsevier Patient Education © 2021 [employer]

## 2022-01-05 DIAGNOSIS — Z12.31 ENCOUNTER FOR SCREENING MAMMOGRAM FOR BREAST CANCER: ICD-10-CM

## 2022-01-06 DIAGNOSIS — I25.10 CORONARY ARTERY DISEASE INVOLVING NATIVE CORONARY ARTERY OF NATIVE HEART WITHOUT ANGINA PECTORIS: ICD-10-CM

## 2022-01-06 DIAGNOSIS — R23.2 VASOMOTOR FLUSHING: ICD-10-CM

## 2022-01-06 DIAGNOSIS — E78.2 MIXED HYPERLIPIDEMIA: Chronic | ICD-10-CM

## 2022-01-06 RX ORDER — ROSUVASTATIN CALCIUM 40 MG/1
40 TABLET, COATED ORAL DAILY
Qty: 30 TABLET | Refills: 10 | Status: SHIPPED | OUTPATIENT
Start: 2022-01-06 | End: 2023-01-16

## 2022-01-08 RX ORDER — PAROXETINE 10 MG/1
10 TABLET, FILM COATED ORAL EVERY MORNING
Qty: 30 TABLET | Refills: 5 | Status: SHIPPED | OUTPATIENT
Start: 2022-01-08 | End: 2022-08-17

## 2022-01-23 DIAGNOSIS — I10 ESSENTIAL HYPERTENSION: ICD-10-CM

## 2022-01-23 DIAGNOSIS — I25.10 CORONARY ARTERY DISEASE INVOLVING NATIVE CORONARY ARTERY OF NATIVE HEART WITHOUT ANGINA PECTORIS: ICD-10-CM

## 2022-01-24 RX ORDER — METOPROLOL SUCCINATE 25 MG/1
25 TABLET, EXTENDED RELEASE ORAL
Qty: 30 TABLET | Refills: 5 | Status: SHIPPED | OUTPATIENT
Start: 2022-01-24 | End: 2022-01-27 | Stop reason: SDUPTHER

## 2022-01-27 DIAGNOSIS — I25.10 CORONARY ARTERY DISEASE INVOLVING NATIVE CORONARY ARTERY OF NATIVE HEART WITHOUT ANGINA PECTORIS: ICD-10-CM

## 2022-01-27 DIAGNOSIS — I10 ESSENTIAL HYPERTENSION: ICD-10-CM

## 2022-01-27 RX ORDER — METOPROLOL SUCCINATE 25 MG/1
25 TABLET, EXTENDED RELEASE ORAL
Qty: 30 TABLET | Refills: 5 | Status: SHIPPED | OUTPATIENT
Start: 2022-01-27 | End: 2022-09-08

## 2022-02-28 DIAGNOSIS — E04.1 SOLITARY THYROID NODULE: Primary | ICD-10-CM

## 2022-03-07 DIAGNOSIS — M79.671 BILATERAL FOOT PAIN: Primary | ICD-10-CM

## 2022-03-07 DIAGNOSIS — M79.672 BILATERAL FOOT PAIN: Primary | ICD-10-CM

## 2022-03-28 ENCOUNTER — LAB (OUTPATIENT)
Dept: LAB | Facility: HOSPITAL | Age: 62
End: 2022-03-28

## 2022-03-28 ENCOUNTER — OFFICE VISIT (OUTPATIENT)
Dept: ENDOCRINOLOGY | Facility: CLINIC | Age: 62
End: 2022-03-28

## 2022-03-28 ENCOUNTER — HOSPITAL ENCOUNTER (OUTPATIENT)
Dept: ULTRASOUND IMAGING | Facility: HOSPITAL | Age: 62
Discharge: HOME | End: 2022-03-28

## 2022-03-28 VITALS
HEART RATE: 93 BPM | DIASTOLIC BLOOD PRESSURE: 72 MMHG | SYSTOLIC BLOOD PRESSURE: 110 MMHG | BODY MASS INDEX: 31.39 KG/M2 | OXYGEN SATURATION: 99 % | WEIGHT: 200 LBS | HEIGHT: 67 IN

## 2022-03-28 DIAGNOSIS — E03.9 ACQUIRED HYPOTHYROIDISM: Primary | ICD-10-CM

## 2022-03-28 DIAGNOSIS — E04.1 SOLITARY THYROID NODULE: ICD-10-CM

## 2022-03-28 DIAGNOSIS — Z13.220 SCREENING, LIPID: ICD-10-CM

## 2022-03-28 DIAGNOSIS — E55.9 VITAMIN D DEFICIENCY: ICD-10-CM

## 2022-03-28 DIAGNOSIS — I10 ESSENTIAL HYPERTENSION: ICD-10-CM

## 2022-03-28 DIAGNOSIS — E03.9 ACQUIRED HYPOTHYROIDISM: ICD-10-CM

## 2022-03-28 DIAGNOSIS — Z13.1 SCREENING FOR DIABETES MELLITUS: ICD-10-CM

## 2022-03-28 LAB
25(OH)D3 SERPL-MCNC: 42.7 NG/ML (ref 30–100)
ALBUMIN SERPL-MCNC: 4.4 G/DL (ref 3.5–5.2)
ALBUMIN/GLOB SERPL: 1.6 G/DL
ALP SERPL-CCNC: 81 U/L (ref 39–117)
ALT SERPL W P-5'-P-CCNC: 24 U/L (ref 1–33)
ANION GAP SERPL CALCULATED.3IONS-SCNC: 12.1 MMOL/L (ref 5–15)
AST SERPL-CCNC: 19 U/L (ref 1–32)
BILIRUB SERPL-MCNC: 0.2 MG/DL (ref 0–1.2)
BILIRUB UR QL STRIP: NEGATIVE
BUN SERPL-MCNC: 12 MG/DL (ref 6–20)
BUN/CREAT SERPL: 14.8 (ref 7–25)
CALCIUM SPEC-SCNC: 9.5 MG/DL (ref 8.6–10.5)
CHLORIDE SERPL-SCNC: 100 MMOL/L (ref 98–107)
CHOLEST SERPL-MCNC: 146 MG/DL (ref 0–200)
CLARITY UR: CLEAR
CO2 SERPL-SCNC: 24.9 MMOL/L (ref 22–29)
COLOR UR: YELLOW
CREAT SERPL-MCNC: 0.81 MG/DL (ref 0.57–1)
DEPRECATED RDW RBC AUTO: 42.7 FL (ref 37–54)
EGFRCR SERPLBLD CKD-EPI 2021: 83.7 ML/MIN/1.73
ERYTHROCYTE [DISTWIDTH] IN BLOOD BY AUTOMATED COUNT: 12.6 % (ref 12.3–15.4)
GLOBULIN UR ELPH-MCNC: 2.7 GM/DL
GLUCOSE SERPL-MCNC: 83 MG/DL (ref 65–99)
GLUCOSE UR STRIP-MCNC: NEGATIVE MG/DL
HBA1C MFR BLD: 5.9 % (ref 4.8–5.6)
HCT VFR BLD AUTO: 40.2 % (ref 34–46.6)
HDLC SERPL-MCNC: 54 MG/DL (ref 40–60)
HGB BLD-MCNC: 13.4 G/DL (ref 12–15.9)
HGB UR QL STRIP.AUTO: NEGATIVE
KETONES UR QL STRIP: ABNORMAL
LDLC SERPL CALC-MCNC: 66 MG/DL (ref 0–100)
LDLC/HDLC SERPL: 1.14 {RATIO}
LEUKOCYTE ESTERASE UR QL STRIP.AUTO: NEGATIVE
MCH RBC QN AUTO: 31.2 PG (ref 26.6–33)
MCHC RBC AUTO-ENTMCNC: 33.3 G/DL (ref 31.5–35.7)
MCV RBC AUTO: 93.5 FL (ref 79–97)
NITRITE UR QL STRIP: NEGATIVE
PH UR STRIP.AUTO: <=5 [PH] (ref 5–8)
PLATELET # BLD AUTO: 370 10*3/MM3 (ref 140–450)
PMV BLD AUTO: 11.1 FL (ref 6–12)
POTASSIUM SERPL-SCNC: 5 MMOL/L (ref 3.5–5.2)
PROT SERPL-MCNC: 7.1 G/DL (ref 6–8.5)
PROT UR QL STRIP: NEGATIVE
RBC # BLD AUTO: 4.3 10*6/MM3 (ref 3.77–5.28)
SODIUM SERPL-SCNC: 137 MMOL/L (ref 136–145)
SP GR UR STRIP: 1.03 (ref 1–1.03)
TRIGL SERPL-MCNC: 152 MG/DL (ref 0–150)
TSH SERPL DL<=0.05 MIU/L-ACNC: 1.37 UIU/ML (ref 0.27–4.2)
UROBILINOGEN UR QL STRIP: ABNORMAL
VLDLC SERPL-MCNC: 26 MG/DL (ref 5–40)
WBC NRBC COR # BLD: 10.55 10*3/MM3 (ref 3.4–10.8)

## 2022-03-28 PROCEDURE — 80053 COMPREHEN METABOLIC PANEL: CPT

## 2022-03-28 PROCEDURE — 84443 ASSAY THYROID STIM HORMONE: CPT

## 2022-03-28 PROCEDURE — 76536 US EXAM OF HEAD AND NECK: CPT

## 2022-03-28 PROCEDURE — 83036 HEMOGLOBIN GLYCOSYLATED A1C: CPT

## 2022-03-28 PROCEDURE — 99214 OFFICE O/P EST MOD 30 MIN: CPT | Performed by: NURSE PRACTITIONER

## 2022-03-28 PROCEDURE — 81003 URINALYSIS AUTO W/O SCOPE: CPT

## 2022-03-28 PROCEDURE — 82306 VITAMIN D 25 HYDROXY: CPT

## 2022-03-28 PROCEDURE — 80061 LIPID PANEL: CPT

## 2022-03-28 PROCEDURE — 85027 COMPLETE CBC AUTOMATED: CPT

## 2022-03-28 PROCEDURE — 36415 COLL VENOUS BLD VENIPUNCTURE: CPT

## 2022-03-28 NOTE — PROGRESS NOTES
"Chief Complaint  Thyroid Problem    Subjective          Amee Almonte presents to [facility]  History of Present Illness     In office visit       Primary provider DIXIE Wang      61 year old female for follow up     Diagnosed in 2019      Reason hypothyroidism     Timing constant      Quality controlled        Severity moderate         Location/size - thyroid      Ultrasound dated Feb. 2020     Right lobe 4.8 x 1.8 x 1.7 cm      Left lobe 4.7 x 1.7 x 1.2 cm      Isthmus measures 3 mm     Several small nodules in the right lobe, the largest measuring 4 mm     Nodule in the isthmus measuring 8 mm     Stable in Feb. 2021                   Alleviating Factors - compliance with  levothyroxine 125 mcg taking 1/2 tablet daily          Review of Systems - General ROS: negative        Objective   Vital Signs:   /72   Pulse 93   Ht 170.2 cm (67\")   Wt 90.7 kg (200 lb)   SpO2 99%   BMI 31.32 kg/m²     Physical Exam  Constitutional:       Appearance: Normal appearance.   Cardiovascular:      Rate and Rhythm: Regular rhythm.      Heart sounds: Normal heart sounds.   Pulmonary:      Breath sounds: Normal breath sounds.   Musculoskeletal:      Cervical back: Normal range of motion and neck supple.   Neurological:      Mental Status: She is alert.        Result Review :   The following data was reviewed by: DIXIE Beltran on 03/28/2022:  Common labs    Common Labsle 6/21/21 6/21/21 9/27/21 9/27/21    0819 0819 1013 1013   Glucose  91  85   BUN  12  12   Creatinine  0.90  0.78   eGFR Non African Am  64  76   Sodium  135 (A)  139   Potassium  4.4  4.6   Chloride  100  103   Calcium  9.6  9.8   Albumin  5.00  4.70   Total Bilirubin  0.4  0.2   Alkaline Phosphatase  96  74   AST (SGOT)  29  20   ALT (SGPT)  30  29   WBC   8.45    Hemoglobin   13.3    Hematocrit   40.0    Platelets   376    Hemoglobin A1C 5.50      (A) Abnormal value                      Assessment " and Plan    Diagnoses and all orders for this visit:    1. Acquired hypothyroidism (Primary)  -     TSH    2. Solitary thyroid nodule    3. Vitamin D deficiency           Hypothyroidism               Medication must me taken on an empty stomach at least one hour before food, drink and other medications. Only take with water. If taking vitamins or antiacids needs to be 4 hours before or after.     We may need to change to brand name synthroid to due variability in levels     Taking levothyroxine 125 mcg daily --takes 1/2 tablet         Lab Results   Component Value Date    TSH 1.100 09/27/2021          Thyroid nodules      Ultrasound dated Feb. 2020     Right lobe 4.8 x 1.8 x 1.7 cm      Left lobe 4.7 x 1.7 x 1.2 cm      Isthmus measures 3 mm     Several small nodules in the right lobe, the largest measuring 4 mm     Nodule in the isthmus measuring 8 mm          Repeat in Feb. 2021 -- stable exam     Has ultrasound scheduled for today             Weight management     Obesity      Patient's Body mass index is 31.32 kg/m². indicating that she is obese (BMI >30). Obesity-related health conditions include the following: none. Obesity is unchanged. BMI is is above average; no BMI management plan is appropriate. We discussed portion control and increasing exercise..             Bone health      Vitamin d def.      Taking vitamin d daily                                Follow Up   Return in about 6 months (around 9/28/2022) for Recheck.  Patient was given instructions and counseling regarding her condition or for health maintenance advice. Please see specific information pulled into the AVS if appropriate.         This document has been electronically signed by DIXIE Beltran on March 28, 2022 08:36 CDT.

## 2022-03-29 ENCOUNTER — OFFICE VISIT (OUTPATIENT)
Dept: PODIATRY | Facility: CLINIC | Age: 62
End: 2022-03-29

## 2022-03-29 VITALS — HEART RATE: 76 BPM | BODY MASS INDEX: 31.39 KG/M2 | WEIGHT: 200 LBS | OXYGEN SATURATION: 100 % | HEIGHT: 67 IN

## 2022-03-29 DIAGNOSIS — M72.2 PLANTAR FASCIITIS: ICD-10-CM

## 2022-03-29 DIAGNOSIS — M79.671 RIGHT FOOT PAIN: Primary | ICD-10-CM

## 2022-03-29 PROCEDURE — 20550 NJX 1 TENDON SHEATH/LIGAMENT: CPT | Performed by: PODIATRIST

## 2022-03-29 PROCEDURE — 99203 OFFICE O/P NEW LOW 30 MIN: CPT | Performed by: PODIATRIST

## 2022-04-07 RX ORDER — DEXAMETHASONE SODIUM PHOSPHATE 4 MG/ML
2 INJECTION, SOLUTION INTRA-ARTICULAR; INTRALESIONAL; INTRAMUSCULAR; INTRAVENOUS; SOFT TISSUE ONCE
Status: COMPLETED | OUTPATIENT
Start: 2022-04-07 | End: 2022-04-07

## 2022-04-07 RX ADMIN — DEXAMETHASONE SODIUM PHOSPHATE 2 MG: 4 INJECTION, SOLUTION INTRA-ARTICULAR; INTRALESIONAL; INTRAMUSCULAR; INTRAVENOUS; SOFT TISSUE at 12:55

## 2022-05-01 DIAGNOSIS — I10 ESSENTIAL HYPERTENSION: ICD-10-CM

## 2022-05-01 DIAGNOSIS — E03.9 ACQUIRED HYPOTHYROIDISM: Chronic | ICD-10-CM

## 2022-05-01 DIAGNOSIS — I25.10 CORONARY ARTERY DISEASE INVOLVING NATIVE CORONARY ARTERY OF NATIVE HEART WITHOUT ANGINA PECTORIS: ICD-10-CM

## 2022-05-03 RX ORDER — LEVOTHYROXINE SODIUM 0.12 MG/1
TABLET ORAL
Qty: 90 TABLET | Refills: 0 | Status: SHIPPED | OUTPATIENT
Start: 2022-05-03 | End: 2022-08-16

## 2022-05-03 RX ORDER — LISINOPRIL 10 MG/1
10 TABLET ORAL DAILY
Qty: 90 TABLET | Refills: 3 | Status: SHIPPED | OUTPATIENT
Start: 2022-05-03 | End: 2022-07-24 | Stop reason: SDUPTHER

## 2022-07-24 DIAGNOSIS — I10 ESSENTIAL HYPERTENSION: ICD-10-CM

## 2022-07-25 RX ORDER — LISINOPRIL 10 MG/1
10 TABLET ORAL DAILY
Qty: 90 TABLET | Refills: 3 | Status: SHIPPED | OUTPATIENT
Start: 2022-07-25

## 2022-08-16 DIAGNOSIS — R23.2 VASOMOTOR FLUSHING: ICD-10-CM

## 2022-08-16 DIAGNOSIS — E03.9 ACQUIRED HYPOTHYROIDISM: Chronic | ICD-10-CM

## 2022-08-16 RX ORDER — LEVOTHYROXINE SODIUM 0.12 MG/1
TABLET ORAL
Qty: 90 TABLET | Refills: 0 | Status: SHIPPED | OUTPATIENT
Start: 2022-08-16 | End: 2023-03-23 | Stop reason: SDUPTHER

## 2022-08-17 RX ORDER — PAROXETINE 10 MG/1
10 TABLET, FILM COATED ORAL EVERY MORNING
Qty: 30 TABLET | Refills: 4 | Status: SHIPPED | OUTPATIENT
Start: 2022-08-17

## 2022-08-22 ENCOUNTER — OFFICE VISIT (OUTPATIENT)
Dept: PODIATRY | Facility: CLINIC | Age: 62
End: 2022-08-22

## 2022-08-22 VITALS — OXYGEN SATURATION: 98 % | HEIGHT: 67 IN | WEIGHT: 200 LBS | HEART RATE: 72 BPM | BODY MASS INDEX: 31.39 KG/M2

## 2022-08-22 DIAGNOSIS — M72.2 PLANTAR FASCIITIS: Primary | ICD-10-CM

## 2022-08-22 DIAGNOSIS — M79.671 RIGHT FOOT PAIN: ICD-10-CM

## 2022-08-22 PROCEDURE — 20550 NJX 1 TENDON SHEATH/LIGAMENT: CPT | Performed by: PODIATRIST

## 2022-08-22 RX ORDER — DEXAMETHASONE SODIUM PHOSPHATE 4 MG/ML
2 INJECTION, SOLUTION INTRA-ARTICULAR; INTRALESIONAL; INTRAMUSCULAR; INTRAVENOUS; SOFT TISSUE ONCE
Status: COMPLETED | OUTPATIENT
Start: 2022-08-22 | End: 2022-08-22

## 2022-08-22 RX ORDER — TRIAMCINOLONE ACETONIDE 40 MG/ML
20 INJECTION, SUSPENSION INTRA-ARTICULAR; INTRAMUSCULAR ONCE
Status: COMPLETED | OUTPATIENT
Start: 2022-08-22 | End: 2022-08-22

## 2022-08-22 RX ADMIN — DEXAMETHASONE SODIUM PHOSPHATE 2 MG: 4 INJECTION, SOLUTION INTRA-ARTICULAR; INTRALESIONAL; INTRAMUSCULAR; INTRAVENOUS; SOFT TISSUE at 11:07

## 2022-08-22 RX ADMIN — TRIAMCINOLONE ACETONIDE 20 MG: 40 INJECTION, SUSPENSION INTRA-ARTICULAR; INTRAMUSCULAR at 11:08

## 2022-08-22 NOTE — PROGRESS NOTES
Amee Almonte  1960  62 y.o. female      08/22/2022    Chief Complaint   Patient presents with   • Right Foot - Pain       History of Present Illness    Amee Almonte is a 62 y.o.female presents to clinic with right foot pain. Patient states her pain has been back for a few weeks and that the steroid helped until then.       Past Medical History:   Diagnosis Date   • Acquired hypothyroidism 02/26/2018   • Cancer (HCC)    • Heart attack (HCC)    • Hypertension    • Mixed hyperlipidemia 07/30/2018   • Overweight (BMI 25.0-29.9) 07/30/2018   • Plantar fasciitis    • Recurrent major depressive disorder, in partial remission (HCC) 02/26/2018   • Smoker 02/26/2018         Past Surgical History:   Procedure Laterality Date   • CARDIAC CATHETERIZATION N/A 5/13/2018    Procedure: Left Heart Cath;  Surgeon: Bro Sotelo MD;  Location: [facility] CATH INVASIVE LOCATION;  Service: Cardiology   • HYSTERECTOMY  2003   • TN RT/LT HEART CATHETERS N/A 5/13/2018    Procedure: Percutaneous Coronary Intervention;  Surgeon: Bro Sotelo MD;  Location: [facility] CATH INVASIVE LOCATION;  Service: Cardiology   • TUBAL ABDOMINAL LIGATION  1999         Family History   Problem Relation Age of Onset   • Heart disease Mother    • Cancer Mother    • Skin cancer Mother    • Diabetes Father    • Heart disease Father    • Cancer Father    • Lung cancer Father        Allergies   Allergen Reactions   • Iodine Other (See Comments)   • Iodides Swelling and Rash       Social History     Socioeconomic History   • Marital status:    Tobacco Use   • Smoking status: Former Smoker   • Smokeless tobacco: Never Used   Vaping Use   • Vaping Use: Never used   Substance and Sexual Activity   • Alcohol use: Yes   • Drug use: No   • Sexual activity: Yes     Birth control/protection: None, Surgical     Comment: Hysterectomy         Current Outpatient Medications   Medication Sig Dispense Refill   • Cholecalciferol (VITAMIN D-3) 5000 UNIT/ML liquid  "Take 5,000 mg by mouth 3 (Three) Times a Week.     • Cyanocobalamin (VITAMIN B-12) 5000 MCG lozenge Take 5,000 mcg by mouth 1 (One) Time Per Week.     • latanoprost (XALATAN) 0.005 % ophthalmic solution Administer 1 drop to both eyes every night at bedtime.     • levothyroxine (SYNTHROID, LEVOTHROID) 125 MCG tablet TAKE 1/2 TABLET BY MOUTH IN THE MORNING 90 tablet 0   • lisinopril (PRINIVIL,ZESTRIL) 10 MG tablet Take 1 tablet by mouth Daily. 90 tablet 3   • loratadine (Claritin) 10 MG tablet Take 1 tablet by mouth Daily. (Patient taking differently: Take 10 mg by mouth.) 30 tablet 5   • metoprolol succinate XL (TOPROL-XL) 25 MG 24 hr tablet Take 1 tablet by mouth Daily With Dinner. 30 tablet 5   • multivitamin with minerals tablet tablet Take 1 tablet by mouth Daily.     • naproxen (Naprosyn) 500 MG tablet Take 1 tablet by mouth 2 (Two) Times a Day With Meals. 60 tablet 3   • Omega-3 Fatty Acids (OMEGA-3 FISH OIL PO) Take 1,000 mg by mouth Daily.     • PARoxetine (PAXIL) 10 MG tablet TAKE 1 TABLET BY MOUTH EVERY MORNING. 30 tablet 4   • Potassium 99 MG tablet Take 99 mg by mouth 3 (Three) Times a Week.     • rosuvastatin (CRESTOR) 40 MG tablet Take 1 tablet by mouth Daily. 30 tablet 10   • ticagrelor (Brilinta) 60 MG tablet tablet Take 1 tablet by mouth 2 (Two) Times a Day. 60 tablet 5     No current facility-administered medications for this visit.       Review of Systems   Constitutional: Negative.    HENT: Negative.    Eyes: Negative.    Respiratory: Negative.    Cardiovascular: Negative.    Gastrointestinal: Negative.    Endocrine: Negative.    Genitourinary: Negative.    Musculoskeletal: Positive for arthralgias.        Joint pain  Foot pain     Allergic/Immunologic: Negative.    Neurological: Negative.    Hematological: Negative.    Psychiatric/Behavioral: Negative.          OBJECTIVE    Pulse 72   Ht 170.2 cm (67\")   Wt 90.7 kg (200 lb)   LMP  (LMP Unknown)   SpO2 98%   BMI 31.32 kg/m²     Physical " Exam  Vitals reviewed.   Constitutional:       General: She is not in acute distress.     Appearance: She is well-developed.   HENT:      Head: Normocephalic and atraumatic.      Nose: Nose normal.   Eyes:      Conjunctiva/sclera: Conjunctivae normal.      Pupils: Pupils are equal, round, and reactive to light.   Cardiovascular:      Pulses:           Dorsalis pedis pulses are 2+ on the right side.        Posterior tibial pulses are 2+ on the right side.   Pulmonary:      Effort: Pulmonary effort is normal. No respiratory distress.      Breath sounds: No wheezing.   Musculoskeletal:         General: Tenderness present. No deformity. Normal range of motion.      Right foot: Normal range of motion. No deformity.        Feet:    Feet:      Right foot:      Protective Sensation: 5 sites tested. 5 sites sensed.      Skin integrity: Skin integrity normal.      Toenail Condition: Right toenails are normal.   Skin:     General: Skin is warm and dry.      Capillary Refill: Capillary refill takes less than 2 seconds.   Neurological:      Mental Status: She is alert and oriented to person, place, and time.   Psychiatric:         Behavior: Behavior normal.         Thought Content: Thought content normal.       Procedures    Plantar Fasciits Injection  Date/Time: 08/22/2022   Performed by: UBALDO XIAO  Authorized by: UBALDO XIAO   Consent: Verbal consent obtained. Written consent obtained.  Risks and benefits: risks, benefits and alternatives were discussed  Consent given by: patient  Patient identity confirmed: verbally with patient  Indications: pain relief  Nerve block body site: right  heel.  Sedation:  Patient sedated: no    Patient position: sitting  Needle size: 27 G  Local anesthetic: 0.5% Marcaine plain, Kenalog 40 mg/ml , Decadron 4 mg/mL.   Outcome: pain improved  Patient tolerance: Patient tolerated the procedure well with no immediate complications   ASSESSMENT AND PLAN    Diagnoses and all orders for this  visit:    1. Plantar fasciitis (Primary)  -     dexamethasone (DECADRON) injection 2 mg  -     triamcinolone acetonide (KENALOG-40) injection 20 mg    2. Right foot pain  -     dexamethasone (DECADRON) injection 2 mg  -     triamcinolone acetonide (KENALOG-40) injection 20 mg        -Repeat steroid injection right foot.  See procedure note above.  Recheck as needed.          This document has been electronically signed by Grady Odell DPM on August 22, 2022 11:35 CDT     8/22/2022  11:35 CDT

## 2022-08-25 RX ORDER — DEXAMETHASONE SODIUM PHOSPHATE 4 MG/ML
4 INJECTION, SOLUTION INTRA-ARTICULAR; INTRALESIONAL; INTRAMUSCULAR; INTRAVENOUS; SOFT TISSUE ONCE
Status: COMPLETED | OUTPATIENT
Start: 2022-08-25 | End: 2022-08-25

## 2022-08-25 RX ORDER — TRIAMCINOLONE ACETONIDE 40 MG/ML
40 INJECTION, SUSPENSION INTRA-ARTICULAR; INTRAMUSCULAR ONCE
Status: COMPLETED | OUTPATIENT
Start: 2022-08-25 | End: 2022-08-25

## 2022-08-25 RX ADMIN — TRIAMCINOLONE ACETONIDE 40 MG: 40 INJECTION, SUSPENSION INTRA-ARTICULAR; INTRAMUSCULAR at 15:02

## 2022-08-25 RX ADMIN — DEXAMETHASONE SODIUM PHOSPHATE 4 MG: 4 INJECTION, SOLUTION INTRA-ARTICULAR; INTRALESIONAL; INTRAMUSCULAR; INTRAVENOUS; SOFT TISSUE at 15:01

## 2022-09-08 DIAGNOSIS — I25.10 CORONARY ARTERY DISEASE INVOLVING NATIVE CORONARY ARTERY OF NATIVE HEART WITHOUT ANGINA PECTORIS: ICD-10-CM

## 2022-09-08 DIAGNOSIS — I10 ESSENTIAL HYPERTENSION: ICD-10-CM

## 2022-09-08 RX ORDER — METOPROLOL SUCCINATE 25 MG/1
25 TABLET, EXTENDED RELEASE ORAL
Qty: 30 TABLET | Refills: 4 | Status: SHIPPED | OUTPATIENT
Start: 2022-09-08 | End: 2023-02-20

## 2022-11-14 ENCOUNTER — OFFICE VISIT (OUTPATIENT)
Dept: CARDIOLOGY | Facility: CLINIC | Age: 62
End: 2022-11-14

## 2022-11-14 VITALS
DIASTOLIC BLOOD PRESSURE: 80 MMHG | OXYGEN SATURATION: 98 % | WEIGHT: 204.4 LBS | BODY MASS INDEX: 32.08 KG/M2 | HEIGHT: 67 IN | SYSTOLIC BLOOD PRESSURE: 136 MMHG | HEART RATE: 86 BPM

## 2022-11-14 DIAGNOSIS — I25.10 CORONARY ARTERY DISEASE INVOLVING NATIVE CORONARY ARTERY OF NATIVE HEART WITHOUT ANGINA PECTORIS: Primary | ICD-10-CM

## 2022-11-14 PROCEDURE — 99214 OFFICE O/P EST MOD 30 MIN: CPT | Performed by: INTERNAL MEDICINE

## 2022-11-14 PROCEDURE — 93000 ELECTROCARDIOGRAM COMPLETE: CPT | Performed by: INTERNAL MEDICINE

## 2022-11-14 NOTE — PROGRESS NOTES
[facility] Cardiology  OFFICE NOTE    Cardiovascular Medicine  Nolan Calderón M.D., RPVI         No referring provider defined for this encounter.    Thank you for asking me to see Amee Almonte for CAD.    This is a 62 y.o. female with:    1. Coronary artery disease involving native coronary artery of native heart without angina pectoris    2. Mixed hyperlipidemia    3. Essential hypertension          Chief complaint -Follow-up CAD        History of present Illness- 62 y.o.-year-old lady who works at the [employer] at [place] had acute inferior wall MI had stent placement to mid right coronary artery in May 2018 with 3.0x23mm Xience stent by Dr. Crabtree, doing well , exercising 30-40 minutes daily.    Has been on Brilinta only 60mg after 12  Months. Stopped ASA due to excessive bruising.   Reported intermittent atypical chest pain, however no exertional chest pain on walking for 2 miles daily.   Stopped smoking.   No other acute complaints    11/14/2022:  No acute issues since last visit.  Occasional sharp, short lasting pains.  No exertional chest pain.  Continues to stay active can walk for 2 miles without any limitations from chest pain or shortness of breath.  Tolerating medications well without any side effects.    04/27/2020:  No acute issues since last visit.   Continues to have intermittent chest pain. Feels like ache/sore on the left side side of the chest, most likely the next day after she had exerted herself   Taking brilinta without any problems    09/27/2021:  No acute issues since last visit. Walks and exercises on daily basis without limitation from chest pain. Occasional left sided sharp pain, lasting for a few seconds, which is atypical    Review of Systems - ROS  Constitution: Negative for weakness, weight gain and weight loss.   HENT: Negative for congestion.    Eyes: Negative for blurred vision.   Cardiovascular: As mentioned above  Respiratory: Negative for cough and hemoptysis.     Endocrine: Negative for polydipsia and polyuria.   Hematologic/Lymphatic: Negative for bleeding problem. Does not bruise/bleed easily.   Skin: Negative for flushing.   Musculoskeletal: Negative for neck pain and stiffness.   Gastrointestinal: Negative for abdominal pain, diarrhea, jaundice, melena, nausea and vomiting.   Genitourinary: Negative for dysuria and hematuria.   Neurological: Negative for dizziness, focal weakness and numbness.   Psychiatric/Behavioral: Negative for altered mental status and depression.     I reviewed the ROS as documented here and confirmed the accuracy of it with the patient today. 11/14/2022        All other systems were reviewed and were negative.    family history includes Cancer in her father and mother; Diabetes in her father; Heart disease in her father and mother; Lung cancer in her father; Skin cancer in her mother.     reports that she has quit smoking. She has never used smokeless tobacco. She reports current alcohol use. She reports that she does not use drugs.    Allergies   Allergen Reactions   • Iodine Other (See Comments)   • Iodides Swelling and Rash         Current Outpatient Medications:   •  latanoprost (XALATAN) 0.005 % ophthalmic solution, Administer 1 drop to both eyes every night at bedtime., Disp: , Rfl:   •  levothyroxine (SYNTHROID, LEVOTHROID) 125 MCG tablet, TAKE 1/2 TABLET BY MOUTH IN THE MORNING, Disp: 90 tablet, Rfl: 0  •  lisinopril (PRINIVIL,ZESTRIL) 10 MG tablet, Take 1 tablet by mouth Daily., Disp: 90 tablet, Rfl: 3  •  loratadine (Claritin) 10 MG tablet, Take 1 tablet by mouth Daily. (Patient taking differently: Take 1 tablet by mouth.), Disp: 30 tablet, Rfl: 5  •  metoprolol succinate XL (TOPROL-XL) 25 MG 24 hr tablet, TAKE 1 TABLET BY MOUTH DAILY WITH DINNER., Disp: 30 tablet, Rfl: 4  •  multivitamin with minerals tablet tablet, Take 1 tablet by mouth Daily., Disp: , Rfl:   •  Omega-3 Fatty Acids (OMEGA-3 FISH OIL PO), Take 1,000 mg by mouth  "Daily., Disp: , Rfl:   •  PARoxetine (PAXIL) 10 MG tablet, TAKE 1 TABLET BY MOUTH EVERY MORNING., Disp: 30 tablet, Rfl: 4  •  Potassium 99 MG tablet, Take 99 mg by mouth 3 (Three) Times a Week., Disp: , Rfl:   •  rosuvastatin (CRESTOR) 40 MG tablet, Take 1 tablet by mouth Daily., Disp: 30 tablet, Rfl: 10  •  ticagrelor (Brilinta) 60 MG tablet tablet, Take 1 tablet by mouth 2 (Two) Times a Day., Disp: 60 tablet, Rfl: 5  •  Cholecalciferol (VITAMIN D-3) 5000 UNIT/ML liquid, Take 5,000 mg by mouth 3 (Three) Times a Week., Disp: , Rfl:   •  Cyanocobalamin (VITAMIN B-12) 5000 MCG lozenge, Take 5,000 mcg by mouth 1 (One) Time Per Week., Disp: , Rfl:   •  naproxen (Naprosyn) 500 MG tablet, Take 1 tablet by mouth 2 (Two) Times a Day With Meals., Disp: 60 tablet, Rfl: 3  •  vitamin D3 (Vitamin D) 125 MCG (5000 UT) capsule capsule, Take 1 capsule by mouth. Three times weekly, Disp: , Rfl:     Physical Exam:  Vitals:    11/14/22 1540   BP: 136/80   BP Location: Left arm   Patient Position: Sitting   Cuff Size: Adult   Pulse: 86   SpO2: 98%   Weight: 92.7 kg (204 lb 6.4 oz)   Height: 170.2 cm (67\")   PainSc: 0-No pain   PainLoc: Chest     Current Pain Level: none  Pulse Ox: Normal        on room air  General: alert, appears stated age and cooperative     Body Habitus: well-nourished    HEENT: Head: Normocephalic, no lesions, without obvious abnormality. No arcus senilis, xanthelasma or xanthomas.    Neuro: alert, oriented x3  Pulses: 2+ and symmetric  JVP: Volume/Pulsation:       Normal.  Normal waveforms.   Appropriate inspiratory decrease.  No Kussmaul's. No Tejal's.   Carotid Exam: no bruit normal pulsation bilaterally    Carotid Volume: normal.                     Respirations: no increased work of breathing            Chest:  Normal              Pulmonary:Normal       Precordium: Normal impulses. P2 is not palpable.  RV Heave: absent  LV Heave: absent  [place]:  normal size and placement  Palpable S4: absent.  Heart rate: " normal    Heart Rhythm: regular                                     Heart Sounds: S1: normal    S2: normal  S3: absent                               S4: absent  Opening Snap: absent            Pericardial Rub:  Absent: .                 Abdomen:   Appearance: normal .  Palpation: Soft, non-tender to palpation, bowel sounds positive in all four quadrants; no guarding or rebound tenderness  Extremity: no edema.   LE Skin: no rashes  LE Hair:  normal  LE Pulses: well perfused with normal pulses in the distal extremities  Pallor on elevation: Absent. Rubor on dependency: None     I have reexamined the patient and the results are consistent with the previously documented exam. Nolan Calderón MD         DATA REVIEWED:     EKG. I personally reviewed and interpreted the EKG.  NSR    ECG/EMG Results (all)     None        ---------------------------------------------------  TTE/DAVID:  Results for orders placed during the hospital encounter of 05/13/18    Adult Transthoracic Echo Complete W/ Cont if Necessary Per Protocol    Interpretation Summary  · Mild tricuspid valve regurgitation is present.  · Left atrial cavity size is mildly dilated.  · Left ventricular systolic function is normal. Estimated EF = 60%.  · The following left ventricular wall segments are hypokinetic: apical inferior and apex hypokinetic.      --------------------------------------------------------------------------------------------------  LABS:     The CVD Risk score (DANA'Agostino, et al., 2008) failed to calculate for the following reasons:    The patient has a prior MI, stroke, CHF, or peripheral vascular disease diagnosis         Lab Results   Component Value Date    GLUCOSE 83 03/28/2022    BUN 12 03/28/2022    CREATININE 0.81 03/28/2022    EGFRIFNONA 76 09/27/2021    BCR 14.8 03/28/2022    K 5.0 03/28/2022    CO2 24.9 03/28/2022    CALCIUM 9.5 03/28/2022    ALBUMIN 4.40 03/28/2022    AST 19 03/28/2022    ALT 24 03/28/2022     Lab Results   Component  Value Date    WBC 10.55 03/28/2022    HGB 13.4 03/28/2022    HCT 40.2 03/28/2022    MCV 93.5 03/28/2022     03/28/2022     Lab Results   Component Value Date    CHOL 146 03/28/2022    TRIG 152 (H) 03/28/2022    HDL 54 03/28/2022    LDL 66 03/28/2022     Lab Results   Component Value Date    TSH 1.370 03/28/2022    F7GQFAU 108.0 03/12/2020    A4HTDRP 8.67 09/20/2019    THYROIDAB 12 01/22/2020     Lab Results   Component Value Date    TROPONINI 2.270 (C) 05/15/2018     Lab Results   Component Value Date    HGBA1C 5.90 (H) 03/28/2022     No results found for: DDIMER  Lab Results   Component Value Date    ALT 24 03/28/2022     Lab Results   Component Value Date    HGBA1C 5.90 (H) 03/28/2022    HGBA1C 5.50 06/21/2021     Lab Results   Component Value Date    CREATININE 0.81 03/28/2022     Lab Results   Component Value Date    IRON 62 09/09/2019    TIBC 328 09/09/2019     No results found for: INR, PROTIME    Assessment/Plan     Status post inferior wall MI with stent placement to mid right coronary artery with 3.0x23mm Xience stent  in May 2018 on Brilinta 60mg now after 12 months of DAPT.  Continued to have intermittent chest pain, Nuc stress 04/2020 test showed small infarct inferior wall without ischemia,  atypical symptoms.     Hypertension -on Toprol-XL and lisinopril 10 mg daily     Hyper lipidemia increased crestor to 40mg, LDL down to 62     Hypothyroidism on Synthroid supplements followed by Dr. Veloz.     Prevention:  Patient's Body mass index is 32.01 kg/m². BMI is above normal parameters. Recommendations include: exercise counseling and nutrition counseling.      Amee Almonte is a former smoker    AAA Screening:   Not needed    6 months        This document has been electronically signed by Nolan Calderón MD on November 14, 2022 15:44 CST

## 2022-11-16 DIAGNOSIS — I25.10 CORONARY ARTERY DISEASE INVOLVING NATIVE CORONARY ARTERY OF NATIVE HEART WITHOUT ANGINA PECTORIS: ICD-10-CM

## 2022-11-16 RX ORDER — TICAGRELOR 60 MG/1
TABLET ORAL
Qty: 60 TABLET | Refills: 11 | Status: SHIPPED | OUTPATIENT
Start: 2022-11-16

## 2022-11-17 LAB
QT INTERVAL: 358 MS
QTC INTERVAL: 428 MS

## 2023-01-16 DIAGNOSIS — I25.10 CORONARY ARTERY DISEASE INVOLVING NATIVE CORONARY ARTERY OF NATIVE HEART WITHOUT ANGINA PECTORIS: ICD-10-CM

## 2023-01-16 DIAGNOSIS — E78.2 MIXED HYPERLIPIDEMIA: Chronic | ICD-10-CM

## 2023-01-16 RX ORDER — ROSUVASTATIN CALCIUM 40 MG/1
40 TABLET, COATED ORAL DAILY
Qty: 30 TABLET | Refills: 1 | Status: SHIPPED | OUTPATIENT
Start: 2023-01-16 | End: 2023-03-23

## 2023-02-20 DIAGNOSIS — I10 ESSENTIAL HYPERTENSION: ICD-10-CM

## 2023-02-20 DIAGNOSIS — I25.10 CORONARY ARTERY DISEASE INVOLVING NATIVE CORONARY ARTERY OF NATIVE HEART WITHOUT ANGINA PECTORIS: ICD-10-CM

## 2023-02-20 RX ORDER — METOPROLOL SUCCINATE 25 MG/1
25 TABLET, EXTENDED RELEASE ORAL
Qty: 30 TABLET | Refills: 3 | Status: SHIPPED | OUTPATIENT
Start: 2023-02-20

## 2023-02-21 ENCOUNTER — TELEPHONE (OUTPATIENT)
Dept: ENDOCRINOLOGY | Facility: CLINIC | Age: 63
End: 2023-02-21
Payer: COMMERCIAL

## 2023-03-14 ENCOUNTER — OFFICE VISIT (OUTPATIENT)
Dept: PODIATRY | Facility: CLINIC | Age: 63
End: 2023-03-14
Payer: COMMERCIAL

## 2023-03-14 VITALS
HEIGHT: 67 IN | WEIGHT: 204 LBS | BODY MASS INDEX: 32.02 KG/M2 | HEART RATE: 101 BPM | SYSTOLIC BLOOD PRESSURE: 146 MMHG | OXYGEN SATURATION: 98 % | DIASTOLIC BLOOD PRESSURE: 91 MMHG

## 2023-03-14 DIAGNOSIS — M72.2 PLANTAR FASCIITIS: ICD-10-CM

## 2023-03-14 DIAGNOSIS — M79.671 PAIN OF RIGHT HEEL: Primary | ICD-10-CM

## 2023-03-14 PROCEDURE — 99212 OFFICE O/P EST SF 10 MIN: CPT | Performed by: NURSE PRACTITIONER

## 2023-03-14 PROCEDURE — 64450 NJX AA&/STRD OTHER PN/BRANCH: CPT | Performed by: NURSE PRACTITIONER

## 2023-03-14 RX ORDER — DEXAMETHASONE SODIUM PHOSPHATE 4 MG/ML
2 INJECTION, SOLUTION INTRA-ARTICULAR; INTRALESIONAL; INTRAMUSCULAR; INTRAVENOUS; SOFT TISSUE ONCE
Status: COMPLETED | OUTPATIENT
Start: 2023-03-14 | End: 2023-03-14

## 2023-03-14 RX ORDER — TRIAMCINOLONE ACETONIDE 40 MG/ML
20 INJECTION, SUSPENSION INTRA-ARTICULAR; INTRAMUSCULAR ONCE
Status: COMPLETED | OUTPATIENT
Start: 2023-03-14 | End: 2023-03-14

## 2023-03-14 RX ADMIN — TRIAMCINOLONE ACETONIDE 20 MG: 40 INJECTION, SUSPENSION INTRA-ARTICULAR; INTRAMUSCULAR at 15:51

## 2023-03-14 RX ADMIN — DEXAMETHASONE SODIUM PHOSPHATE 2 MG: 4 INJECTION, SOLUTION INTRA-ARTICULAR; INTRALESIONAL; INTRAMUSCULAR; INTRAVENOUS; SOFT TISSUE at 15:51

## 2023-03-14 NOTE — PROGRESS NOTES
Amee Almonte  1960  62 y.o. female      03/14/2023    Chief Complaint   Patient presents with   • Right Foot - Follow-up, Pain       History of Present Illness    Amee Almonte is a 62 y.o.female presents to the clinic today for right foot pain. Patient is requesting a steroid injection.       Past Medical History:   Diagnosis Date   • Acquired hypothyroidism 02/26/2018   • Cancer (HCC)    • Heart attack (HCC)    • Hypertension    • Mixed hyperlipidemia 07/30/2018   • Overweight (BMI 25.0-29.9) 07/30/2018   • Plantar fasciitis    • Recurrent major depressive disorder, in partial remission (HCC) 02/26/2018   • Smoker 02/26/2018         Past Surgical History:   Procedure Laterality Date   • CARDIAC CATHETERIZATION N/A 5/13/2018    Procedure: Left Heart Cath;  Surgeon: Bro Sotelo MD;  Location: [facility] CATH INVASIVE LOCATION;  Service: Cardiology   • HYSTERECTOMY  2003   • AL RT/LT HEART CATHETERS N/A 5/13/2018    Procedure: Percutaneous Coronary Intervention;  Surgeon: Bro Sotelo MD;  Location: [facility] CATH INVASIVE LOCATION;  Service: Cardiology   • TUBAL ABDOMINAL LIGATION  1999         Family History   Problem Relation Age of Onset   • Heart disease Mother    • Cancer Mother    • Skin cancer Mother    • Diabetes Father    • Heart disease Father    • Cancer Father    • Lung cancer Father        Allergies   Allergen Reactions   • Iodine Other (See Comments)   • Iodides Swelling and Rash       Social History     Socioeconomic History   • Marital status:    Tobacco Use   • Smoking status: Former   • Smokeless tobacco: Never   Vaping Use   • Vaping Use: Never used   Substance and Sexual Activity   • Alcohol use: Yes   • Drug use: No   • Sexual activity: Yes     Birth control/protection: None, Surgical     Comment: Hysterectomy         Current Outpatient Medications   Medication Sig Dispense Refill   • Brilinta 60 MG tablet tablet TAKE 1 TABLET BY MOUTH 2 TIMES A DAY 60 tablet 11   •  "latanoprost (XALATAN) 0.005 % ophthalmic solution Administer 1 drop to both eyes every night at bedtime.     • levothyroxine (SYNTHROID, LEVOTHROID) 125 MCG tablet TAKE 1/2 TABLET BY MOUTH IN THE MORNING 90 tablet 0   • lisinopril (PRINIVIL,ZESTRIL) 10 MG tablet Take 1 tablet by mouth Daily. 90 tablet 3   • loratadine (Claritin) 10 MG tablet Take 1 tablet by mouth Daily. (Patient taking differently: Take 1 tablet by mouth.) 30 tablet 5   • metoprolol succinate XL (TOPROL-XL) 25 MG 24 hr tablet TAKE 1 TABLET BY MOUTH DAILY WITH DINNER. 30 tablet 3   • multivitamin with minerals tablet tablet Take 1 tablet by mouth Daily.     • Omega-3 Fatty Acids (OMEGA-3 FISH OIL PO) Take 1,000 mg by mouth Daily.     • PARoxetine (PAXIL) 10 MG tablet TAKE 1 TABLET BY MOUTH EVERY MORNING. 30 tablet 4   • Potassium 99 MG tablet Take 99 mg by mouth 3 (Three) Times a Week.     • rosuvastatin (CRESTOR) 40 MG tablet TAKE 1 TABLET BY MOUTH DAILY 30 tablet 1   • vitamin D3 125 MCG (5000 UT) capsule capsule Take 1 capsule by mouth. Three times weekly       No current facility-administered medications for this visit.       Review of Systems   Constitutional: Negative.    HENT: Negative.    Eyes: Negative.    Respiratory: Negative.    Cardiovascular: Negative.    Gastrointestinal: Negative.    Endocrine: Negative.    Genitourinary: Negative.    Musculoskeletal:        Foot pain   Skin: Negative.    Allergic/Immunologic: Negative.    Neurological: Negative.    Hematological: Negative.    Psychiatric/Behavioral: Negative.          OBJECTIVE    /91   Pulse 101   Ht 170.2 cm (67\")   Wt 92.5 kg (204 lb)   LMP  (LMP Unknown)   SpO2 98%   BMI 31.95 kg/m²     Physical Exam  Vitals reviewed.   Constitutional:       General: She is not in acute distress.     Appearance: Normal appearance.   HENT:      Head: Normocephalic.   Eyes:      Pupils: Pupils are equal, round, and reactive to light.   Pulmonary:      Effort: No respiratory distress. "   Musculoskeletal:         General: Tenderness present. No signs of injury.      Cervical back: Neck supple.        Feet:    Skin:     General: Skin is warm and dry.      Findings: No erythema.   Neurological:      General: No focal deficit present.      Mental Status: She is alert.   Psychiatric:         Mood and Affect: Mood normal.         Behavior: Behavior normal.              Procedures  Plantar Fasciits Injection  Date/Time: 03/14/2023  Performed by: KANDY HAWTHORNE  Authorized by: KANDY HAWTHORNE  Consent: Verbal consent obtained. Written consent obtained.  Risks and benefits: risks, benefits and alternatives were discussed  Consent given by: patient  Patient identity confirmed: verbally with patient  Indications: pain relief  Nerve block body site: right heel.  Sedation:  Patient sedated: no    Patient position: sitting  Needle size: 27 G  Local anesthetic: 0.5% Marcaine plain, Kenalog 40 mg/ml , Decadron 4 mg/mL.   Outcome: pain improved  Patient tolerance: Patient tolerated the procedure well with no immediate complications      ASSESSMENT AND PLAN    Diagnoses and all orders for this visit:    1. Pain of right heel (Primary)    2. Plantar fasciitis  -     dexamethasone (DECADRON) injection 2 mg  -     triamcinolone acetonide (KENALOG-40) injection 20 mg        - Comprehensive foot and ankle exam performed  - X-rays taken and reviewed.  No acute osseous or articular abnormalities.  - Steroid injection given.  - Patient advised to continue to stretch, ice and to make appropriate shoe gear changes to include wearing athletic type shoes with supportive insoles. Limit bare foot walking.    - Recommended over-the-counter insole such as power steps, spenco or walk fit  to properly support the arch in order to alleviate the tension and stress on the plantar fascia associated with normal daily walking. Patient was educated on the break-in period for new arch supports.  - All questions were answered to the  patient's satisfaction.  - RTC PRN          This document has been electronically signed by DIXIE Wilson on March 14, 2023 16:59 CDT

## 2023-03-22 DIAGNOSIS — E78.2 MIXED HYPERLIPIDEMIA: Chronic | ICD-10-CM

## 2023-03-22 DIAGNOSIS — I25.10 CORONARY ARTERY DISEASE INVOLVING NATIVE CORONARY ARTERY OF NATIVE HEART WITHOUT ANGINA PECTORIS: ICD-10-CM

## 2023-03-23 DIAGNOSIS — E03.9 ACQUIRED HYPOTHYROIDISM: Chronic | ICD-10-CM

## 2023-03-23 RX ORDER — ROSUVASTATIN CALCIUM 40 MG/1
40 TABLET, COATED ORAL DAILY
Qty: 30 TABLET | Refills: 0 | Status: SHIPPED | OUTPATIENT
Start: 2023-03-23

## 2023-03-24 RX ORDER — LEVOTHYROXINE SODIUM 0.12 MG/1
TABLET ORAL
Qty: 90 TABLET | Refills: 0 | Status: SHIPPED | OUTPATIENT
Start: 2023-03-24

## 2023-04-25 DIAGNOSIS — E78.2 MIXED HYPERLIPIDEMIA: Chronic | ICD-10-CM

## 2023-04-25 DIAGNOSIS — I25.10 CORONARY ARTERY DISEASE INVOLVING NATIVE CORONARY ARTERY OF NATIVE HEART WITHOUT ANGINA PECTORIS: ICD-10-CM

## 2023-04-25 RX ORDER — ROSUVASTATIN CALCIUM 40 MG/1
40 TABLET, COATED ORAL DAILY
Qty: 30 TABLET | Refills: 0 | Status: SHIPPED | OUTPATIENT
Start: 2023-04-25

## 2023-05-24 DIAGNOSIS — E78.2 MIXED HYPERLIPIDEMIA: Chronic | ICD-10-CM

## 2023-05-24 DIAGNOSIS — I25.10 CORONARY ARTERY DISEASE INVOLVING NATIVE CORONARY ARTERY OF NATIVE HEART WITHOUT ANGINA PECTORIS: ICD-10-CM

## 2023-05-24 RX ORDER — ROSUVASTATIN CALCIUM 40 MG/1
40 TABLET, COATED ORAL DAILY
Qty: 30 TABLET | Refills: 0 | Status: SHIPPED | OUTPATIENT
Start: 2023-05-24

## 2023-06-08 ENCOUNTER — OFFICE VISIT (OUTPATIENT)
Dept: ENDOCRINOLOGY | Facility: CLINIC | Age: 63
End: 2023-06-08
Payer: COMMERCIAL

## 2023-06-08 VITALS
DIASTOLIC BLOOD PRESSURE: 78 MMHG | HEART RATE: 98 BPM | HEIGHT: 67 IN | SYSTOLIC BLOOD PRESSURE: 136 MMHG | OXYGEN SATURATION: 95 % | WEIGHT: 201.7 LBS | BODY MASS INDEX: 31.66 KG/M2

## 2023-06-08 DIAGNOSIS — E03.9 ACQUIRED HYPOTHYROIDISM: Primary | Chronic | ICD-10-CM

## 2023-06-08 DIAGNOSIS — E04.1 SOLITARY THYROID NODULE: ICD-10-CM

## 2023-06-08 DIAGNOSIS — E55.9 VITAMIN D DEFICIENCY: ICD-10-CM

## 2023-06-08 DIAGNOSIS — R23.2 VASOMOTOR FLUSHING: ICD-10-CM

## 2023-06-08 RX ORDER — PAROXETINE 10 MG/1
10 TABLET, FILM COATED ORAL EVERY MORNING
Qty: 30 TABLET | Refills: 11 | Status: SHIPPED | OUTPATIENT
Start: 2023-06-08

## 2023-06-08 RX ORDER — LEVOTHYROXINE SODIUM 0.12 MG/1
TABLET ORAL
Qty: 30 TABLET | Refills: 11 | Status: SHIPPED | OUTPATIENT
Start: 2023-06-08

## 2023-06-08 NOTE — PROGRESS NOTES
"Chief Complaint  Hypothyroidism    Subjective          Amee Almonte presents to [facility]  History of Present Illness     In office visit       Primary provider DIXIE Interiano     63-year-old female presents for follow-up    Reason hypothyroidism and thyroid nodules    Duration since 2019    Timing constant    Quality stable    Severity is mild    Location/size - thyroid     ges  Study Result    Narrative & Impression   ULTRASOUND THYROID     INDICATION: nodule, E04.1 Nontoxic single thyroid nodule     COMPARISON: None     TECHNIQUE: Grayscale and color Doppler sonographic images of the  thyroid gland were obtained.     FINDINGS:  RIGHT THYROID LOBE:  Right thyroid lobe measures 4.6 x 1.7 x 1.7 cm. There are no  nodules. Vascularity is normal.     ISTHMUS: There is a nodule in the isthmus measuring 9 x 6 x 5 mm  which is solid (2pts) isoechoic (1pt) wider than tall (0pts)  smooth margin (0pts) without echogenic foci (0pts), comprising a  total of 3pts on the TI-RADS system.     LEFT THYROID LOBE:  Left thyroid lobe measures 4.0 x 2.0 x 1.6 cm. Vascularity is  normal. There are no nodules.     IMPRESSION:  TI-RADS 3: Mildly Suspicious  For nodules >=1.5cm, follow up at 1, 3, and 5 years is  recommended. For nodules >=2.5cm, FNA is recommended.     Electronically signed by:  Melanie Torres MD  3/28/2022 1:52 PM CDT  Workstation:                Objective   Vital Signs:   /78   Pulse 98   Ht 170.2 cm (67\")   Wt 91.5 kg (201 lb 11.2 oz)   SpO2 95%   BMI 31.59 kg/m²     Physical Exam  Constitutional:       Appearance: Normal appearance.   Cardiovascular:      Rate and Rhythm: Regular rhythm.      Heart sounds: Normal heart sounds.   Pulmonary:      Breath sounds: Normal breath sounds.   Musculoskeletal:      Cervical back: Normal range of motion and neck supple.   Neurological:      Mental Status: She is alert.      Result Review :   The following data was reviewed by: " Calos Hollidaylle Aaron, APRYOLANDA on 03/28/2022:                Assessment and Plan    Diagnoses and all orders for this visit:    1. Acquired hypothyroidism (Primary)  -     TSH  -     Vitamin D,25-Hydroxy  -     levothyroxine (SYNTHROID, LEVOTHROID) 125 MCG tablet; TAKE 1/2 TABLET BY MOUTH IN THE MORNING  Dispense: 30 tablet; Refill: 11    2. Vitamin D deficiency  -     TSH  -     Vitamin D,25-Hydroxy    3. Solitary thyroid nodule  -     US Thyroid; Future    4. Vasomotor flushing  -     PARoxetine (PAXIL) 10 MG tablet; Take 1 tablet by mouth Every Morning.  Dispense: 30 tablet; Refill: 11           Hypothyroidism               Medication must me taken on an empty stomach at least one hour before food, drink and other medications. Only take with water. If taking vitamins or antiacids needs to be 4 hours before or after.     We may need to change to brand name synthroid to due variability in levels     Taking levothyroxine 125 mcg daily --takes 1/2 tablet         Lab Results   Component Value Date    TSH 1.370 03/28/2022          Thyroid nodules         ULTRASOUND THYROID     INDICATION: nodule, E04.1 Nontoxic single thyroid nodule     COMPARISON: None     TECHNIQUE: Grayscale and color Doppler sonographic images of the  thyroid gland were obtained.     FINDINGS:  RIGHT THYROID LOBE:  Right thyroid lobe measures 4.6 x 1.7 x 1.7 cm. There are no  nodules. Vascularity is normal.     ISTHMUS: There is a nodule in the isthmus measuring 9 x 6 x 5 mm  which is solid (2pts) isoechoic (1pt) wider than tall (0pts)  smooth margin (0pts) without echogenic foci (0pts), comprising a  total of 3pts on the TI-RADS system.     LEFT THYROID LOBE:  Left thyroid lobe measures 4.0 x 2.0 x 1.6 cm. Vascularity is  normal. There are no nodules.     IMPRESSION:  TI-RADS 3: Mildly Suspicious  For nodules >=1.5cm, follow up at 1, 3, and 5 years is  recommended. For nodules >=2.5cm, FNA is recommended.     Electronically signed by:  Melanie Torres MD   3/28/2022 1:52 PM CDT  Workstation: [id number]     Stable exam     Repeat u/s in one year             Bone health      Vitamin d def.      Taking vitamin d daily           Vasomotor flushing    Taking paxil                       Follow Up   Return in about 1 year (around 6/8/2024) for Recheck.  Patient was given instructions and counseling regarding her condition or for health maintenance advice. Please see specific information pulled into the AVS if appropriate.         This document has been electronically signed by DIXIE Beltran on June 8, 2023 09:04 CDT.

## 2023-06-14 DIAGNOSIS — I10 ESSENTIAL HYPERTENSION: ICD-10-CM

## 2023-06-14 DIAGNOSIS — I25.10 CORONARY ARTERY DISEASE INVOLVING NATIVE CORONARY ARTERY OF NATIVE HEART WITHOUT ANGINA PECTORIS: ICD-10-CM

## 2023-06-14 RX ORDER — METOPROLOL SUCCINATE 25 MG/1
25 TABLET, EXTENDED RELEASE ORAL
Qty: 30 TABLET | Refills: 2 | Status: SHIPPED | OUTPATIENT
Start: 2023-06-14

## 2023-06-21 LAB
25(OH)D3 SERPL-MCNC: 52.8 NG/ML (ref 30–100)
TSH SERPL DL<=0.05 MIU/L-ACNC: 1.51 UIU/ML (ref 0.27–4.2)

## 2023-06-25 DIAGNOSIS — R10.10 UPPER ABDOMINAL PAIN: Primary | ICD-10-CM

## 2023-06-25 DIAGNOSIS — R79.89 ELEVATED LFTS: ICD-10-CM

## 2023-06-25 RX ORDER — CLARITHROMYCIN 500 MG/1
500 TABLET, COATED ORAL 2 TIMES DAILY
Qty: 28 TABLET | Refills: 0 | Status: CANCELLED | OUTPATIENT
Start: 2023-06-25 | End: 2023-07-09

## 2023-06-25 RX ORDER — OMEPRAZOLE 20 MG/1
20 CAPSULE, DELAYED RELEASE ORAL 2 TIMES DAILY
Qty: 28 CAPSULE | Refills: 0 | Status: CANCELLED | OUTPATIENT
Start: 2023-06-25 | End: 2023-07-09

## 2023-06-25 RX ORDER — AMOXICILLIN 500 MG/1
1000 CAPSULE ORAL 2 TIMES DAILY
Qty: 56 CAPSULE | Refills: 0 | Status: CANCELLED | OUTPATIENT
Start: 2023-06-25 | End: 2023-07-09

## 2023-07-06 ENCOUNTER — TELEPHONE (OUTPATIENT)
Dept: FAMILY MEDICINE CLINIC | Facility: CLINIC | Age: 63
End: 2023-07-06

## 2023-07-06 NOTE — TELEPHONE ENCOUNTER
Patient would like a call back from Georgia MCDONALD MA. Said she missed her call.  Call back number: [phone]

## 2023-07-28 DIAGNOSIS — I25.10 CORONARY ARTERY DISEASE INVOLVING NATIVE CORONARY ARTERY OF NATIVE HEART WITHOUT ANGINA PECTORIS: ICD-10-CM

## 2023-07-28 DIAGNOSIS — E78.2 MIXED HYPERLIPIDEMIA: Chronic | ICD-10-CM

## 2023-07-28 RX ORDER — ROSUVASTATIN CALCIUM 40 MG/1
40 TABLET, COATED ORAL DAILY
Qty: 30 TABLET | Refills: 0 | Status: SHIPPED | OUTPATIENT
Start: 2023-07-28

## 2023-08-15 DIAGNOSIS — I10 ESSENTIAL HYPERTENSION: ICD-10-CM

## 2023-08-15 RX ORDER — LISINOPRIL 10 MG/1
10 TABLET ORAL DAILY
Qty: 90 TABLET | Refills: 2 | Status: SHIPPED | OUTPATIENT
Start: 2023-08-15

## 2023-08-23 DIAGNOSIS — E78.2 MIXED HYPERLIPIDEMIA: Chronic | ICD-10-CM

## 2023-08-23 DIAGNOSIS — I25.10 CORONARY ARTERY DISEASE INVOLVING NATIVE CORONARY ARTERY OF NATIVE HEART WITHOUT ANGINA PECTORIS: ICD-10-CM

## 2023-08-23 RX ORDER — ROSUVASTATIN CALCIUM 40 MG/1
40 TABLET, COATED ORAL DAILY
Qty: 30 TABLET | Refills: 0 | Status: SHIPPED | OUTPATIENT
Start: 2023-08-23

## 2023-09-27 DIAGNOSIS — E78.2 MIXED HYPERLIPIDEMIA: Chronic | ICD-10-CM

## 2023-09-27 DIAGNOSIS — I25.10 CORONARY ARTERY DISEASE INVOLVING NATIVE CORONARY ARTERY OF NATIVE HEART WITHOUT ANGINA PECTORIS: ICD-10-CM

## 2023-09-27 RX ORDER — ROSUVASTATIN CALCIUM 40 MG/1
40 TABLET, COATED ORAL DAILY
Qty: 30 TABLET | Refills: 0 | Status: SHIPPED | OUTPATIENT
Start: 2023-09-27

## 2023-09-29 ENCOUNTER — TELEPHONE (OUTPATIENT)
Dept: CARDIOLOGY | Facility: CLINIC | Age: 63
End: 2023-09-29
Payer: COMMERCIAL

## (undated) DEVICE — PK CATH LAB 60

## (undated) DEVICE — GW PERIPH GUIDERIGHT STD/J/TP PTFE/PCOAT SS 0.038IN 5X150CM

## (undated) DEVICE — DEV INFL MONARCH 20ML

## (undated) DEVICE — TREK CORONARY DILATATION CATHETER 2.50 MM X 20 MM / RAPID-EXCHANGE: Brand: TREK

## (undated) DEVICE — CATH GUIDE LAUNCHER JR4.0 6F 100CM

## (undated) DEVICE — MODEL AT P65, P/N 701554-001KIT CONTENTS: HAND CONTROLLER, 3-WAY HIGH-PRESSURE STOPCOCK WITH ROTATING END AND PREMIUM HIGH-PRESSURE TUBING: Brand: ANGIOTOUCH® KIT

## (undated) DEVICE — ELECTRODE,RT,STRESS,FOAM,50PK: Brand: MEDLINE

## (undated) DEVICE — KT INTRO MINISTICK MAX W/GW NITNL/TUNG ECHO 4F 21G 7CM

## (undated) DEVICE — HI-TORQUE BALANCE MIDDLEWEIGHT GUIDE WIRE .014 J TIP 3.0 CM X 190 CM: Brand: HI-TORQUE BALANCE MIDDLEWEIGHT

## (undated) DEVICE — INTRO SHEATH ART/FEM ENGAGE .038 6F12CM

## (undated) DEVICE — ANGIO-SEAL VIP VASCULAR CLOSURE DEVICE: Brand: ANGIO-SEAL

## (undated) DEVICE — A2000 MULTI-USE SYRINGE KIT, P/N 701277-003KIT CONTENTS: 100ML CONTRAST RESERVOIR AND TUBING WITH CONTRAST SPIKE AND CLAMP: Brand: A2000 MULTI-USE SYRINGE KIT

## (undated) DEVICE — COPILOT KIT INCLUDES BLEEDBACK CONTROL VALVE / GUIDE WIRE INTRODUCER / TORQUE DEVICE: Brand: ACCESSORIES

## (undated) DEVICE — MINI TREK CORONARY DILATATION CATHETER 2.0 MM X 20 MM / RAPID-EXCHANGE: Brand: MINI TREK

## (undated) DEVICE — MODEL BT2000 P/N 700287-012KIT CONTENTS: MANIFOLD WITH SALINE AND CONTRAST PORTS, SALINE TUBING WITH SPIKE AND HAND SYRINGE, TRANSDUCER: Brand: BT2000 AUTOMATED MANIFOLD KIT

## (undated) DEVICE — CATH DIAG EXPO M/ PK 6FR FL4/FR4 PIG 3PK